# Patient Record
Sex: MALE | Race: WHITE | Employment: OTHER | URBAN - NONMETROPOLITAN AREA
[De-identification: names, ages, dates, MRNs, and addresses within clinical notes are randomized per-mention and may not be internally consistent; named-entity substitution may affect disease eponyms.]

---

## 2017-01-03 ENCOUNTER — ANTI-COAG VISIT (OUTPATIENT)
Dept: OTHER | Age: 82
End: 2017-01-03

## 2017-01-03 VITALS
WEIGHT: 181.4 LBS | HEART RATE: 85 BPM | SYSTOLIC BLOOD PRESSURE: 110 MMHG | DIASTOLIC BLOOD PRESSURE: 60 MMHG | BODY MASS INDEX: 30 KG/M2

## 2017-01-03 DIAGNOSIS — I48.19 PERSISTENT ATRIAL FIBRILLATION (HCC): ICD-10-CM

## 2017-01-03 LAB — POC INR: 2.1 (ref 0.8–1.2)

## 2017-01-03 PROCEDURE — 85610 PROTHROMBIN TIME: CPT | Performed by: NURSE PRACTITIONER

## 2017-01-03 PROCEDURE — 99999 PR OFFICE/OUTPT VISIT,PROCEDURE ONLY: CPT | Performed by: NURSE PRACTITIONER

## 2017-01-03 ASSESSMENT — ENCOUNTER SYMPTOMS
BLOOD IN STOOL: 0
CONSTIPATION: 0
SHORTNESS OF BREATH: 0
DIARRHEA: 0

## 2017-01-20 ENCOUNTER — OFFICE VISIT (OUTPATIENT)
Dept: INTERNAL MEDICINE | Age: 82
End: 2017-01-20

## 2017-01-20 VITALS
HEIGHT: 66 IN | DIASTOLIC BLOOD PRESSURE: 80 MMHG | BODY MASS INDEX: 29.73 KG/M2 | WEIGHT: 185 LBS | HEART RATE: 68 BPM | SYSTOLIC BLOOD PRESSURE: 128 MMHG

## 2017-01-20 DIAGNOSIS — I50.22 CHRONIC SYSTOLIC CONGESTIVE HEART FAILURE (HCC): ICD-10-CM

## 2017-01-20 DIAGNOSIS — C46.9 KAPOSI'S SARCOMA (HCC): ICD-10-CM

## 2017-01-20 DIAGNOSIS — I25.10 ASCVD (ARTERIOSCLEROTIC CARDIOVASCULAR DISEASE): Primary | ICD-10-CM

## 2017-01-20 DIAGNOSIS — F41.9 ANXIETY: ICD-10-CM

## 2017-01-20 DIAGNOSIS — I48.21 PERMANENT ATRIAL FIBRILLATION (HCC): ICD-10-CM

## 2017-01-20 DIAGNOSIS — I10 ESSENTIAL HYPERTENSION: ICD-10-CM

## 2017-01-20 DIAGNOSIS — Z95.2 S/P AVR (AORTIC VALVE REPLACEMENT): ICD-10-CM

## 2017-01-20 DIAGNOSIS — Z95.810 AUTOMATIC IMPLANTABLE CARDIOVERTER-DEFIBRILLATOR IN SITU: ICD-10-CM

## 2017-01-20 DIAGNOSIS — Z95.1 S/P CABG (CORONARY ARTERY BYPASS GRAFT): ICD-10-CM

## 2017-01-20 DIAGNOSIS — Z98.890 S/P MVR (MITRAL VALVE REPAIR): ICD-10-CM

## 2017-01-20 DIAGNOSIS — I49.5 SSS (SICK SINUS SYNDROME) (HCC): ICD-10-CM

## 2017-01-20 DIAGNOSIS — E78.5 HYPERLIPIDEMIA, UNSPECIFIED HYPERLIPIDEMIA TYPE: ICD-10-CM

## 2017-01-20 PROCEDURE — 93000 ELECTROCARDIOGRAM COMPLETE: CPT | Performed by: INTERNAL MEDICINE

## 2017-01-20 PROCEDURE — G8420 CALC BMI NORM PARAMETERS: HCPCS | Performed by: INTERNAL MEDICINE

## 2017-01-20 PROCEDURE — 99214 OFFICE O/P EST MOD 30 MIN: CPT | Performed by: INTERNAL MEDICINE

## 2017-01-20 PROCEDURE — 1123F ACP DISCUSS/DSCN MKR DOCD: CPT | Performed by: INTERNAL MEDICINE

## 2017-01-20 PROCEDURE — 1036F TOBACCO NON-USER: CPT | Performed by: INTERNAL MEDICINE

## 2017-01-20 PROCEDURE — G8484 FLU IMMUNIZE NO ADMIN: HCPCS | Performed by: INTERNAL MEDICINE

## 2017-01-20 PROCEDURE — 4040F PNEUMOC VAC/ADMIN/RCVD: CPT | Performed by: INTERNAL MEDICINE

## 2017-01-20 PROCEDURE — G8427 DOCREV CUR MEDS BY ELIG CLIN: HCPCS | Performed by: INTERNAL MEDICINE

## 2017-01-20 PROCEDURE — G8598 ASA/ANTIPLAT THER USED: HCPCS | Performed by: INTERNAL MEDICINE

## 2017-01-20 RX ORDER — DIGOXIN 250 MCG
250 TABLET ORAL DAILY
Qty: 30 TABLET | Refills: 5 | Status: SHIPPED | OUTPATIENT
Start: 2017-01-20 | End: 2017-09-27 | Stop reason: SDUPTHER

## 2017-01-20 RX ORDER — POTASSIUM CHLORIDE 600 MG/1
TABLET, FILM COATED, EXTENDED RELEASE ORAL
Qty: 30 TABLET | Refills: 5 | Status: SHIPPED | OUTPATIENT
Start: 2017-01-20 | End: 2017-05-24 | Stop reason: SDUPTHER

## 2017-01-20 RX ORDER — FUROSEMIDE 40 MG/1
TABLET ORAL
Qty: 30 TABLET | Refills: 5 | Status: SHIPPED | OUTPATIENT
Start: 2017-01-20 | End: 2017-05-24 | Stop reason: SDUPTHER

## 2017-01-20 RX ORDER — LORAZEPAM 1 MG/1
1 TABLET ORAL NIGHTLY PRN
Qty: 60 TABLET | Refills: 3 | Status: SHIPPED | OUTPATIENT
Start: 2017-01-20 | End: 2017-05-24 | Stop reason: SDUPTHER

## 2017-01-20 RX ORDER — CARVEDILOL 6.25 MG/1
6.25 TABLET ORAL 2 TIMES DAILY
Qty: 60 TABLET | Refills: 5 | Status: SHIPPED | OUTPATIENT
Start: 2017-01-20 | End: 2017-05-24 | Stop reason: SDUPTHER

## 2017-01-20 RX ORDER — ATORVASTATIN CALCIUM 20 MG/1
20 TABLET, FILM COATED ORAL NIGHTLY
Qty: 30 TABLET | Refills: 5 | Status: SHIPPED | OUTPATIENT
Start: 2017-01-20 | End: 2017-05-24 | Stop reason: SDUPTHER

## 2017-01-31 ENCOUNTER — ANTI-COAG VISIT (OUTPATIENT)
Dept: OTHER | Age: 82
End: 2017-01-31

## 2017-01-31 VITALS
HEART RATE: 66 BPM | DIASTOLIC BLOOD PRESSURE: 68 MMHG | SYSTOLIC BLOOD PRESSURE: 145 MMHG | BODY MASS INDEX: 29.83 KG/M2 | WEIGHT: 184.8 LBS

## 2017-01-31 DIAGNOSIS — I48.19 PERSISTENT ATRIAL FIBRILLATION (HCC): ICD-10-CM

## 2017-01-31 LAB — POC INR: 4 (ref 0.8–1.2)

## 2017-01-31 PROCEDURE — G8427 DOCREV CUR MEDS BY ELIG CLIN: HCPCS | Performed by: NURSE PRACTITIONER

## 2017-01-31 PROCEDURE — 4040F PNEUMOC VAC/ADMIN/RCVD: CPT | Performed by: NURSE PRACTITIONER

## 2017-01-31 PROCEDURE — 99999 PR OFFICE/OUTPT VISIT,PROCEDURE ONLY: CPT | Performed by: NURSE PRACTITIONER

## 2017-01-31 PROCEDURE — G8598 ASA/ANTIPLAT THER USED: HCPCS | Performed by: NURSE PRACTITIONER

## 2017-01-31 PROCEDURE — 85610 PROTHROMBIN TIME: CPT | Performed by: NURSE PRACTITIONER

## 2017-01-31 PROCEDURE — G8420 CALC BMI NORM PARAMETERS: HCPCS | Performed by: NURSE PRACTITIONER

## 2017-01-31 RX ORDER — LOPERAMIDE HYDROCHLORIDE 2 MG/1
2 CAPSULE ORAL PRN
Status: ON HOLD | COMMUNITY
End: 2022-01-01 | Stop reason: HOSPADM

## 2017-01-31 ASSESSMENT — ENCOUNTER SYMPTOMS
BLOOD IN STOOL: 0
SHORTNESS OF BREATH: 0
CONSTIPATION: 0

## 2017-02-05 DIAGNOSIS — G31.84 MILD COGNITIVE IMPAIRMENT: ICD-10-CM

## 2017-02-06 RX ORDER — DONEPEZIL HYDROCHLORIDE 5 MG/1
TABLET, FILM COATED ORAL
Qty: 30 TABLET | Refills: 5 | Status: SHIPPED | OUTPATIENT
Start: 2017-02-06 | End: 2017-05-24 | Stop reason: SDUPTHER

## 2017-02-13 ENCOUNTER — ANTI-COAG VISIT (OUTPATIENT)
Dept: OTHER | Age: 82
End: 2017-02-13

## 2017-02-13 VITALS
DIASTOLIC BLOOD PRESSURE: 68 MMHG | SYSTOLIC BLOOD PRESSURE: 129 MMHG | BODY MASS INDEX: 30.31 KG/M2 | HEART RATE: 84 BPM | WEIGHT: 187.8 LBS

## 2017-02-13 DIAGNOSIS — I48.19 PERSISTENT ATRIAL FIBRILLATION (HCC): ICD-10-CM

## 2017-02-13 LAB — POC INR: 2.8 (ref 0.8–1.2)

## 2017-02-13 PROCEDURE — 85610 PROTHROMBIN TIME: CPT | Performed by: PHARMACIST

## 2017-02-13 PROCEDURE — 99999 PR OFFICE/OUTPT VISIT,PROCEDURE ONLY: CPT | Performed by: PHARMACIST

## 2017-02-13 PROCEDURE — G8598 ASA/ANTIPLAT THER USED: HCPCS | Performed by: PHARMACIST

## 2017-02-13 PROCEDURE — 4040F PNEUMOC VAC/ADMIN/RCVD: CPT | Performed by: PHARMACIST

## 2017-02-13 PROCEDURE — G8427 DOCREV CUR MEDS BY ELIG CLIN: HCPCS | Performed by: PHARMACIST

## 2017-02-13 PROCEDURE — G8417 CALC BMI ABV UP PARAM F/U: HCPCS | Performed by: PHARMACIST

## 2017-02-13 ASSESSMENT — ENCOUNTER SYMPTOMS
SHORTNESS OF BREATH: 0
BLOOD IN STOOL: 0
CONSTIPATION: 0
DIARRHEA: 0

## 2017-03-06 ENCOUNTER — ANTI-COAG VISIT (OUTPATIENT)
Dept: OTHER | Age: 82
End: 2017-03-06

## 2017-03-06 VITALS
WEIGHT: 185 LBS | DIASTOLIC BLOOD PRESSURE: 67 MMHG | SYSTOLIC BLOOD PRESSURE: 122 MMHG | HEART RATE: 88 BPM | BODY MASS INDEX: 29.86 KG/M2

## 2017-03-06 DIAGNOSIS — I48.19 PERSISTENT ATRIAL FIBRILLATION (HCC): ICD-10-CM

## 2017-03-06 LAB — POC INR: 2.3 (ref 0.8–1.2)

## 2017-03-06 PROCEDURE — 85610 PROTHROMBIN TIME: CPT | Performed by: NURSE PRACTITIONER

## 2017-03-06 PROCEDURE — G8420 CALC BMI NORM PARAMETERS: HCPCS | Performed by: NURSE PRACTITIONER

## 2017-03-06 PROCEDURE — G8598 ASA/ANTIPLAT THER USED: HCPCS | Performed by: NURSE PRACTITIONER

## 2017-03-06 PROCEDURE — 4040F PNEUMOC VAC/ADMIN/RCVD: CPT | Performed by: NURSE PRACTITIONER

## 2017-03-06 PROCEDURE — G8427 DOCREV CUR MEDS BY ELIG CLIN: HCPCS | Performed by: NURSE PRACTITIONER

## 2017-03-06 PROCEDURE — 99999 PR OFFICE/OUTPT VISIT,PROCEDURE ONLY: CPT | Performed by: NURSE PRACTITIONER

## 2017-03-06 ASSESSMENT — ENCOUNTER SYMPTOMS
CONSTIPATION: 0
DIARRHEA: 0
BLOOD IN STOOL: 0

## 2017-03-29 DIAGNOSIS — I48.19 PERSISTENT ATRIAL FIBRILLATION (HCC): Primary | ICD-10-CM

## 2017-04-03 ENCOUNTER — ANTI-COAG VISIT (OUTPATIENT)
Dept: OTHER | Age: 82
End: 2017-04-03

## 2017-04-03 VITALS
WEIGHT: 182 LBS | DIASTOLIC BLOOD PRESSURE: 69 MMHG | HEART RATE: 82 BPM | SYSTOLIC BLOOD PRESSURE: 120 MMHG | BODY MASS INDEX: 29.38 KG/M2

## 2017-04-03 DIAGNOSIS — I48.19 PERSISTENT ATRIAL FIBRILLATION (HCC): ICD-10-CM

## 2017-04-03 LAB
HCT VFR BLD CALC: 33.1 % (ref 42–52)
HEMOGLOBIN: 10.7 GM/DL (ref 14–18)
POC INR: 1.9 (ref 0.8–1.2)

## 2017-04-03 ASSESSMENT — ENCOUNTER SYMPTOMS
BLOOD IN STOOL: 0
CONSTIPATION: 0
DIARRHEA: 0

## 2017-04-24 ENCOUNTER — ANTI-COAG VISIT (OUTPATIENT)
Dept: OTHER | Age: 82
End: 2017-04-24

## 2017-04-24 VITALS
SYSTOLIC BLOOD PRESSURE: 136 MMHG | DIASTOLIC BLOOD PRESSURE: 69 MMHG | HEART RATE: 85 BPM | BODY MASS INDEX: 29.28 KG/M2 | WEIGHT: 181.4 LBS

## 2017-04-24 DIAGNOSIS — I48.19 PERSISTENT ATRIAL FIBRILLATION (HCC): ICD-10-CM

## 2017-04-24 LAB — POC INR: 2.7 (ref 0.8–1.2)

## 2017-04-24 ASSESSMENT — ENCOUNTER SYMPTOMS
BLOOD IN STOOL: 0
SHORTNESS OF BREATH: 1
CONSTIPATION: 0
DIARRHEA: 0

## 2017-05-22 ENCOUNTER — ANTI-COAG VISIT (OUTPATIENT)
Dept: OTHER | Age: 82
End: 2017-05-22

## 2017-05-22 VITALS
BODY MASS INDEX: 29.54 KG/M2 | WEIGHT: 183 LBS | HEART RATE: 86 BPM | DIASTOLIC BLOOD PRESSURE: 65 MMHG | SYSTOLIC BLOOD PRESSURE: 111 MMHG

## 2017-05-22 DIAGNOSIS — I48.19 PERSISTENT ATRIAL FIBRILLATION (HCC): ICD-10-CM

## 2017-05-22 LAB — POC INR: 2.2 (ref 0.8–1.2)

## 2017-05-22 ASSESSMENT — ENCOUNTER SYMPTOMS
DIARRHEA: 0
CONSTIPATION: 0
BLOOD IN STOOL: 0

## 2017-05-24 ENCOUNTER — OFFICE VISIT (OUTPATIENT)
Dept: INTERNAL MEDICINE | Age: 82
End: 2017-05-24

## 2017-05-24 VITALS
DIASTOLIC BLOOD PRESSURE: 70 MMHG | BODY MASS INDEX: 29.25 KG/M2 | HEIGHT: 66 IN | WEIGHT: 182 LBS | SYSTOLIC BLOOD PRESSURE: 120 MMHG | HEART RATE: 64 BPM

## 2017-05-24 DIAGNOSIS — I10 ESSENTIAL HYPERTENSION: ICD-10-CM

## 2017-05-24 DIAGNOSIS — Z95.810 AUTOMATIC IMPLANTABLE CARDIOVERTER-DEFIBRILLATOR IN SITU: ICD-10-CM

## 2017-05-24 DIAGNOSIS — G31.84 MILD COGNITIVE IMPAIRMENT: ICD-10-CM

## 2017-05-24 DIAGNOSIS — Z23 NEED FOR PROPHYLACTIC VACCINATION AGAINST STREPTOCOCCUS PNEUMONIAE (PNEUMOCOCCUS): ICD-10-CM

## 2017-05-24 DIAGNOSIS — I49.5 SSS (SICK SINUS SYNDROME) (HCC): ICD-10-CM

## 2017-05-24 DIAGNOSIS — F41.9 ANXIETY: ICD-10-CM

## 2017-05-24 DIAGNOSIS — I50.22 CHRONIC SYSTOLIC CONGESTIVE HEART FAILURE (HCC): Primary | ICD-10-CM

## 2017-05-24 DIAGNOSIS — I48.19 PERSISTENT ATRIAL FIBRILLATION (HCC): ICD-10-CM

## 2017-05-24 DIAGNOSIS — E78.5 HYPERLIPIDEMIA, UNSPECIFIED HYPERLIPIDEMIA TYPE: ICD-10-CM

## 2017-05-24 DIAGNOSIS — I35.0 AORTIC VALVE STENOSIS, UNSPECIFIED ETIOLOGY: ICD-10-CM

## 2017-05-24 DIAGNOSIS — Z95.2 S/P AVR (AORTIC VALVE REPLACEMENT): ICD-10-CM

## 2017-05-24 DIAGNOSIS — Z98.890 S/P MVR (MITRAL VALVE REPAIR): ICD-10-CM

## 2017-05-24 DIAGNOSIS — Z23 NEED FOR PNEUMOCOCCAL VACCINATION: ICD-10-CM

## 2017-05-24 DIAGNOSIS — Z87.39 H/O: GOUT: ICD-10-CM

## 2017-05-24 PROCEDURE — 93000 ELECTROCARDIOGRAM COMPLETE: CPT | Performed by: INTERNAL MEDICINE

## 2017-05-24 PROCEDURE — 1036F TOBACCO NON-USER: CPT | Performed by: INTERNAL MEDICINE

## 2017-05-24 PROCEDURE — G0009 ADMIN PNEUMOCOCCAL VACCINE: HCPCS | Performed by: INTERNAL MEDICINE

## 2017-05-24 PROCEDURE — G8427 DOCREV CUR MEDS BY ELIG CLIN: HCPCS | Performed by: INTERNAL MEDICINE

## 2017-05-24 PROCEDURE — 1123F ACP DISCUSS/DSCN MKR DOCD: CPT | Performed by: INTERNAL MEDICINE

## 2017-05-24 PROCEDURE — G8598 ASA/ANTIPLAT THER USED: HCPCS | Performed by: INTERNAL MEDICINE

## 2017-05-24 PROCEDURE — 90732 PPSV23 VACC 2 YRS+ SUBQ/IM: CPT | Performed by: INTERNAL MEDICINE

## 2017-05-24 PROCEDURE — 4040F PNEUMOC VAC/ADMIN/RCVD: CPT | Performed by: INTERNAL MEDICINE

## 2017-05-24 PROCEDURE — G8420 CALC BMI NORM PARAMETERS: HCPCS | Performed by: INTERNAL MEDICINE

## 2017-05-24 PROCEDURE — 99214 OFFICE O/P EST MOD 30 MIN: CPT | Performed by: INTERNAL MEDICINE

## 2017-05-24 RX ORDER — LORAZEPAM 1 MG/1
1 TABLET ORAL NIGHTLY PRN
Qty: 60 TABLET | Refills: 3 | Status: CANCELLED | OUTPATIENT
Start: 2017-05-24

## 2017-05-24 RX ORDER — POTASSIUM CHLORIDE 600 MG/1
TABLET, FILM COATED, EXTENDED RELEASE ORAL
Qty: 30 TABLET | Refills: 5 | Status: SHIPPED | OUTPATIENT
Start: 2017-05-24 | End: 2017-07-17 | Stop reason: ALTCHOICE

## 2017-05-24 RX ORDER — CARVEDILOL 6.25 MG/1
6.25 TABLET ORAL 2 TIMES DAILY
Qty: 60 TABLET | Refills: 5 | Status: SHIPPED | OUTPATIENT
Start: 2017-05-24 | End: 2017-09-27 | Stop reason: SDUPTHER

## 2017-05-24 RX ORDER — ALLOPURINOL 100 MG/1
TABLET ORAL
Qty: 90 TABLET | Refills: 3 | Status: SHIPPED | OUTPATIENT
Start: 2017-05-24 | End: 2017-09-27 | Stop reason: SDUPTHER

## 2017-05-24 RX ORDER — FUROSEMIDE 40 MG/1
TABLET ORAL
Qty: 30 TABLET | Refills: 5 | Status: SHIPPED | OUTPATIENT
Start: 2017-05-24 | End: 2017-09-27 | Stop reason: SDUPTHER

## 2017-05-24 RX ORDER — ATORVASTATIN CALCIUM 20 MG/1
20 TABLET, FILM COATED ORAL NIGHTLY
Qty: 30 TABLET | Refills: 5 | Status: SHIPPED | OUTPATIENT
Start: 2017-05-24 | End: 2017-09-27 | Stop reason: SDUPTHER

## 2017-05-24 RX ORDER — DONEPEZIL HYDROCHLORIDE 5 MG/1
TABLET, FILM COATED ORAL
Qty: 30 TABLET | Refills: 5 | Status: SHIPPED | OUTPATIENT
Start: 2017-05-24 | End: 2017-09-27 | Stop reason: SDUPTHER

## 2017-05-24 RX ORDER — LORAZEPAM 1 MG/1
1 TABLET ORAL NIGHTLY PRN
Qty: 60 TABLET | Refills: 3 | Status: SHIPPED | OUTPATIENT
Start: 2017-05-24 | End: 2017-12-14 | Stop reason: SDUPTHER

## 2017-06-19 ENCOUNTER — ANTI-COAG VISIT (OUTPATIENT)
Dept: OTHER | Age: 82
End: 2017-06-19

## 2017-06-19 VITALS
HEART RATE: 79 BPM | WEIGHT: 180 LBS | DIASTOLIC BLOOD PRESSURE: 65 MMHG | BODY MASS INDEX: 28.93 KG/M2 | SYSTOLIC BLOOD PRESSURE: 120 MMHG

## 2017-06-19 DIAGNOSIS — I48.19 PERSISTENT ATRIAL FIBRILLATION (HCC): ICD-10-CM

## 2017-06-19 LAB — POC INR: 2 (ref 0.8–1.2)

## 2017-06-19 ASSESSMENT — ENCOUNTER SYMPTOMS
DIARRHEA: 0
BLOOD IN STOOL: 0
SHORTNESS OF BREATH: 0
CONSTIPATION: 0

## 2017-07-17 ENCOUNTER — HOSPITAL ENCOUNTER (OUTPATIENT)
Dept: PHARMACY | Age: 82
Setting detail: THERAPIES SERIES
Discharge: HOME | End: 2017-07-17
Payer: MEDICARE

## 2017-07-17 VITALS
HEART RATE: 85 BPM | BODY MASS INDEX: 28.61 KG/M2 | WEIGHT: 178 LBS | SYSTOLIC BLOOD PRESSURE: 105 MMHG | DIASTOLIC BLOOD PRESSURE: 62 MMHG

## 2017-07-17 DIAGNOSIS — I48.19 PERSISTENT ATRIAL FIBRILLATION (HCC): ICD-10-CM

## 2017-07-17 LAB
INTERNATIONAL NORMALIZATION RATIO, POC: 2.3
POC INR: 2.3 (ref 0.8–1.2)

## 2017-07-17 PROCEDURE — 99211 OFF/OP EST MAY X REQ PHY/QHP: CPT

## 2017-07-17 PROCEDURE — 36416 COLLJ CAPILLARY BLOOD SPEC: CPT

## 2017-07-17 PROCEDURE — 85610 PROTHROMBIN TIME: CPT

## 2017-07-17 RX ORDER — SACUBITRIL AND VALSARTAN 49; 51 MG/1; MG/1
1 TABLET, FILM COATED ORAL 2 TIMES DAILY
Refills: 3 | COMMUNITY
Start: 2017-06-20 | End: 2017-11-09 | Stop reason: SDUPTHER

## 2017-07-17 ASSESSMENT — ENCOUNTER SYMPTOMS
CONSTIPATION: 0
DIARRHEA: 0
BLOOD IN STOOL: 0
SHORTNESS OF BREATH: 0

## 2017-08-14 ENCOUNTER — HOSPITAL ENCOUNTER (OUTPATIENT)
Dept: PHARMACY | Age: 82
Setting detail: THERAPIES SERIES
Discharge: HOME | End: 2017-08-14
Payer: MEDICARE

## 2017-08-14 VITALS
BODY MASS INDEX: 28.19 KG/M2 | HEART RATE: 83 BPM | DIASTOLIC BLOOD PRESSURE: 84 MMHG | WEIGHT: 175.4 LBS | SYSTOLIC BLOOD PRESSURE: 104 MMHG

## 2017-08-14 DIAGNOSIS — I48.21 PERMANENT ATRIAL FIBRILLATION (HCC): ICD-10-CM

## 2017-08-14 DIAGNOSIS — I48.19 PERSISTENT ATRIAL FIBRILLATION (HCC): ICD-10-CM

## 2017-08-14 LAB — POC INR: 2.1 (ref 0.8–1.2)

## 2017-08-14 PROCEDURE — 99211 OFF/OP EST MAY X REQ PHY/QHP: CPT

## 2017-08-14 PROCEDURE — 36416 COLLJ CAPILLARY BLOOD SPEC: CPT

## 2017-08-14 PROCEDURE — 85610 PROTHROMBIN TIME: CPT

## 2017-08-14 RX ORDER — WARFARIN SODIUM 5 MG/1
TABLET ORAL EVERY EVENING
COMMUNITY
End: 2017-09-19

## 2017-08-14 RX ORDER — WARFARIN SODIUM 5 MG/1
TABLET ORAL
Qty: 135 TABLET | Refills: 3 | Status: SHIPPED | OUTPATIENT
Start: 2017-08-14 | End: 2018-07-26 | Stop reason: SDUPTHER

## 2017-08-14 ASSESSMENT — ENCOUNTER SYMPTOMS
SHORTNESS OF BREATH: 0
BLOOD IN STOOL: 0
DIARRHEA: 0
CONSTIPATION: 0

## 2017-09-19 ENCOUNTER — HOSPITAL ENCOUNTER (OUTPATIENT)
Dept: PHARMACY | Age: 82
Setting detail: THERAPIES SERIES
Discharge: HOME | End: 2017-09-19
Payer: MEDICARE

## 2017-09-19 VITALS
DIASTOLIC BLOOD PRESSURE: 65 MMHG | BODY MASS INDEX: 28.48 KG/M2 | SYSTOLIC BLOOD PRESSURE: 105 MMHG | HEART RATE: 76 BPM | WEIGHT: 177.2 LBS

## 2017-09-19 DIAGNOSIS — I48.19 PERSISTENT ATRIAL FIBRILLATION (HCC): ICD-10-CM

## 2017-09-19 LAB — POC INR: 1.9 (ref 0.8–1.2)

## 2017-09-19 PROCEDURE — 85610 PROTHROMBIN TIME: CPT

## 2017-09-19 PROCEDURE — 99211 OFF/OP EST MAY X REQ PHY/QHP: CPT

## 2017-09-19 PROCEDURE — 36416 COLLJ CAPILLARY BLOOD SPEC: CPT

## 2017-09-19 ASSESSMENT — ENCOUNTER SYMPTOMS
SHORTNESS OF BREATH: 0
CONSTIPATION: 0
BLOOD IN STOOL: 0

## 2017-09-27 ENCOUNTER — OFFICE VISIT (OUTPATIENT)
Dept: INTERNAL MEDICINE CLINIC | Age: 82
End: 2017-09-27
Payer: MEDICARE

## 2017-09-27 ENCOUNTER — HOSPITAL ENCOUNTER (OUTPATIENT)
Age: 82
Discharge: HOME | End: 2017-09-27
Payer: MEDICARE

## 2017-09-27 VITALS
HEIGHT: 66 IN | DIASTOLIC BLOOD PRESSURE: 50 MMHG | WEIGHT: 179 LBS | SYSTOLIC BLOOD PRESSURE: 90 MMHG | HEART RATE: 64 BPM | BODY MASS INDEX: 28.77 KG/M2

## 2017-09-27 DIAGNOSIS — I50.22 CHRONIC SYSTOLIC CONGESTIVE HEART FAILURE (HCC): Primary | ICD-10-CM

## 2017-09-27 DIAGNOSIS — F41.9 ANXIETY: ICD-10-CM

## 2017-09-27 DIAGNOSIS — E78.5 HYPERLIPIDEMIA, UNSPECIFIED HYPERLIPIDEMIA TYPE: ICD-10-CM

## 2017-09-27 DIAGNOSIS — C46.9 KAPOSI'S SARCOMA (HCC): ICD-10-CM

## 2017-09-27 DIAGNOSIS — I25.10 ASCVD (ARTERIOSCLEROTIC CARDIOVASCULAR DISEASE): ICD-10-CM

## 2017-09-27 DIAGNOSIS — Z95.1 S/P CABG (CORONARY ARTERY BYPASS GRAFT): ICD-10-CM

## 2017-09-27 DIAGNOSIS — I49.5 SSS (SICK SINUS SYNDROME) (HCC): ICD-10-CM

## 2017-09-27 DIAGNOSIS — Z95.810 AUTOMATIC IMPLANTABLE CARDIOVERTER-DEFIBRILLATOR IN SITU: ICD-10-CM

## 2017-09-27 DIAGNOSIS — Z87.39 H/O: GOUT: ICD-10-CM

## 2017-09-27 DIAGNOSIS — I48.21 PERMANENT ATRIAL FIBRILLATION (HCC): ICD-10-CM

## 2017-09-27 DIAGNOSIS — G31.84 MILD COGNITIVE IMPAIRMENT: ICD-10-CM

## 2017-09-27 LAB
ANION GAP SERPL CALCULATED.3IONS-SCNC: 7 MEQ/L (ref 8–16)
BUN BLDV-MCNC: 10 MG/DL (ref 7–22)
CALCIUM SERPL-MCNC: 9.2 MG/DL (ref 8.5–10.5)
CHLORIDE BLD-SCNC: 103 MEQ/L (ref 98–111)
CO2: 30 MEQ/L (ref 23–33)
CREAT SERPL-MCNC: 0.9 MG/DL (ref 0.4–1.2)
DIGOXIN LEVEL: 1.2 NG/ML (ref 0.5–2)
GFR SERPL CREATININE-BSD FRML MDRD: 80 ML/MIN/1.73M2
GLUCOSE BLD-MCNC: 71 MG/DL (ref 70–108)
POTASSIUM SERPL-SCNC: 4.2 MEQ/L (ref 3.5–5.2)
SODIUM BLD-SCNC: 140 MEQ/L (ref 135–145)

## 2017-09-27 PROCEDURE — 1123F ACP DISCUSS/DSCN MKR DOCD: CPT | Performed by: INTERNAL MEDICINE

## 2017-09-27 PROCEDURE — G8417 CALC BMI ABV UP PARAM F/U: HCPCS | Performed by: INTERNAL MEDICINE

## 2017-09-27 PROCEDURE — 36415 COLL VENOUS BLD VENIPUNCTURE: CPT

## 2017-09-27 PROCEDURE — G8427 DOCREV CUR MEDS BY ELIG CLIN: HCPCS | Performed by: INTERNAL MEDICINE

## 2017-09-27 PROCEDURE — 99214 OFFICE O/P EST MOD 30 MIN: CPT | Performed by: INTERNAL MEDICINE

## 2017-09-27 PROCEDURE — G8598 ASA/ANTIPLAT THER USED: HCPCS | Performed by: INTERNAL MEDICINE

## 2017-09-27 PROCEDURE — 93000 ELECTROCARDIOGRAM COMPLETE: CPT | Performed by: INTERNAL MEDICINE

## 2017-09-27 PROCEDURE — 1036F TOBACCO NON-USER: CPT | Performed by: INTERNAL MEDICINE

## 2017-09-27 PROCEDURE — 4040F PNEUMOC VAC/ADMIN/RCVD: CPT | Performed by: INTERNAL MEDICINE

## 2017-09-27 PROCEDURE — 80048 BASIC METABOLIC PNL TOTAL CA: CPT

## 2017-09-27 PROCEDURE — 80162 ASSAY OF DIGOXIN TOTAL: CPT

## 2017-09-27 RX ORDER — ALLOPURINOL 100 MG/1
TABLET ORAL
Qty: 90 TABLET | Refills: 3 | Status: SHIPPED | OUTPATIENT
Start: 2017-09-27 | End: 2018-01-22 | Stop reason: SDUPTHER

## 2017-09-27 RX ORDER — DONEPEZIL HYDROCHLORIDE 5 MG/1
TABLET, FILM COATED ORAL
Qty: 30 TABLET | Refills: 5 | Status: SHIPPED | OUTPATIENT
Start: 2017-09-27 | End: 2017-11-20 | Stop reason: SDUPTHER

## 2017-09-27 RX ORDER — ATORVASTATIN CALCIUM 20 MG/1
20 TABLET, FILM COATED ORAL NIGHTLY
Qty: 30 TABLET | Refills: 5 | Status: SHIPPED | OUTPATIENT
Start: 2017-09-27 | End: 2018-01-22 | Stop reason: SDUPTHER

## 2017-09-27 RX ORDER — FUROSEMIDE 40 MG/1
TABLET ORAL
Qty: 30 TABLET | Refills: 5 | Status: SHIPPED | OUTPATIENT
Start: 2017-09-27 | End: 2018-01-22 | Stop reason: SDUPTHER

## 2017-09-27 RX ORDER — CARVEDILOL 6.25 MG/1
6.25 TABLET ORAL 2 TIMES DAILY
Qty: 60 TABLET | Refills: 5 | Status: SHIPPED | OUTPATIENT
Start: 2017-09-27 | End: 2018-01-22 | Stop reason: SDUPTHER

## 2017-09-27 RX ORDER — DIGOXIN 250 MCG
250 TABLET ORAL DAILY
Qty: 30 TABLET | Refills: 5 | Status: SHIPPED | OUTPATIENT
Start: 2017-09-27 | End: 2018-01-22 | Stop reason: SDUPTHER

## 2017-10-05 ENCOUNTER — NURSE ONLY (OUTPATIENT)
Dept: FAMILY MEDICINE CLINIC | Age: 82
End: 2017-10-05
Payer: MEDICARE

## 2017-10-05 DIAGNOSIS — Z23 NEED FOR INFLUENZA VACCINATION: Primary | ICD-10-CM

## 2017-10-05 PROCEDURE — 90688 IIV4 VACCINE SPLT 0.5 ML IM: CPT | Performed by: FAMILY MEDICINE

## 2017-10-05 PROCEDURE — G0008 ADMIN INFLUENZA VIRUS VAC: HCPCS | Performed by: FAMILY MEDICINE

## 2017-10-10 ENCOUNTER — HOSPITAL ENCOUNTER (OUTPATIENT)
Dept: PHARMACY | Age: 82
Setting detail: THERAPIES SERIES
Discharge: HOME | End: 2017-10-10
Payer: MEDICARE

## 2017-10-10 VITALS
SYSTOLIC BLOOD PRESSURE: 111 MMHG | BODY MASS INDEX: 28.48 KG/M2 | HEART RATE: 84 BPM | DIASTOLIC BLOOD PRESSURE: 59 MMHG | WEIGHT: 177.2 LBS

## 2017-10-10 DIAGNOSIS — I48.19 PERSISTENT ATRIAL FIBRILLATION (HCC): ICD-10-CM

## 2017-10-10 LAB
HCT VFR BLD CALC: 32.1 % (ref 42–52)
HEMOGLOBIN: 10.4 GM/DL (ref 14–18)
POC INR: 2 (ref 0.8–1.2)

## 2017-10-10 PROCEDURE — 36415 COLL VENOUS BLD VENIPUNCTURE: CPT

## 2017-10-10 PROCEDURE — 99211 OFF/OP EST MAY X REQ PHY/QHP: CPT

## 2017-10-10 PROCEDURE — 85610 PROTHROMBIN TIME: CPT

## 2017-10-10 ASSESSMENT — ENCOUNTER SYMPTOMS
CONSTIPATION: 0
SHORTNESS OF BREATH: 0
DIARRHEA: 0
BLOOD IN STOOL: 0

## 2017-10-10 NOTE — PROGRESS NOTES
The [address] [facility]  Anticoagulation Clinic  [phone] (phone)  [phone] (fax)  Subjective:      Patient ID: Walter Holloway is a 80 y.o. male. HPI  Has diagnosis of persistent atrial fib. , per Dr. Sherri Contreras referral - being seen for Warfarin monitoring (3 week visit). Correctly states tablet strength, with help from wife. Followed printed instructions for dose. Denies missed dose. Denies diet change. Review of Systems   Constitutional: Negative for activity change, appetite change and fatigue. HENT: Negative for nosebleeds. Respiratory: Negative for shortness of breath. Cardiovascular: Negative for chest pain and leg swelling. Gastrointestinal: Negative for blood in stool, constipation and diarrhea. Genitourinary: Negative for hematuria. Neurological: Negative for dizziness, weakness, light-headedness and headaches. Hematological: Does not bruise/bleed easily. Objective:   Physical Exam   Constitutional: He is oriented to person, place, and time. He appears well-developed. Cardiovascular: Normal rate. Pulmonary/Chest: Effort normal.   Neurological: He is alert and oriented to person, place, and time. Skin: Skin is warm and dry. Psychiatric: He has a normal mood and affect. His behavior is normal.   Vitals reviewed. Assessment:      Lab Results   Component Value Date    INR 2.00 (H) 10/10/2017    INR 1.90 (H) 09/19/2017    INR 2.10 (H) 08/14/2017     INR therapeutic - goal 2-3. Recent Labs      10/10/17   1051   INR  2.00*     Today's H&H:  10.4/32.1 (10.7/33.1 last April). Plan:    POCT INR ordered/reviewed. Routine H&H ordered; drawn per RMA - sent to lab. Continue Coumadin 5 mg MWF, 7.5 mg TThSS PO. Recheck INR 4 weeks. Patient reminded to call the Anticoagulation Clinic with any signs or symptoms of bleeding or with any medication changes. Patient given instructions utilizing the teach back method.     Discharged ambulatory in no apparent distress, with wife.

## 2017-10-24 ENCOUNTER — TELEPHONE (OUTPATIENT)
Dept: INTERNAL MEDICINE CLINIC | Age: 82
End: 2017-10-24

## 2017-10-24 DIAGNOSIS — Z95.810 AUTOMATIC IMPLANTABLE CARDIOVERTER-DEFIBRILLATOR IN SITU: Primary | ICD-10-CM

## 2017-10-24 DIAGNOSIS — R07.9 CHEST PAIN, UNSPECIFIED TYPE: ICD-10-CM

## 2017-10-25 ENCOUNTER — TELEPHONE (OUTPATIENT)
Dept: CARDIOLOGY CLINIC | Age: 82
End: 2017-10-25

## 2017-10-25 ENCOUNTER — NURSE ONLY (OUTPATIENT)
Dept: CARDIOLOGY CLINIC | Age: 82
End: 2017-10-25
Payer: MEDICARE

## 2017-10-25 DIAGNOSIS — Z95.810 AUTOMATIC IMPLANTABLE CARDIOVERTER-DEFIBRILLATOR IN SITU: Primary | ICD-10-CM

## 2017-10-25 PROCEDURE — 93282 PRGRMG EVAL IMPLANTABLE DFB: CPT | Performed by: INTERNAL MEDICINE

## 2017-10-26 ENCOUNTER — OFFICE VISIT (OUTPATIENT)
Dept: CARDIOLOGY CLINIC | Age: 82
End: 2017-10-26
Payer: MEDICARE

## 2017-10-26 VITALS
SYSTOLIC BLOOD PRESSURE: 98 MMHG | BODY MASS INDEX: 28.28 KG/M2 | HEIGHT: 66 IN | HEART RATE: 63 BPM | DIASTOLIC BLOOD PRESSURE: 52 MMHG | WEIGHT: 176 LBS

## 2017-10-26 DIAGNOSIS — I48.19 PERSISTENT ATRIAL FIBRILLATION (HCC): Primary | ICD-10-CM

## 2017-10-26 DIAGNOSIS — I47.20 VT (VENTRICULAR TACHYCARDIA): ICD-10-CM

## 2017-10-26 PROCEDURE — G8427 DOCREV CUR MEDS BY ELIG CLIN: HCPCS | Performed by: INTERNAL MEDICINE

## 2017-10-26 PROCEDURE — 4040F PNEUMOC VAC/ADMIN/RCVD: CPT | Performed by: INTERNAL MEDICINE

## 2017-10-26 PROCEDURE — 93000 ELECTROCARDIOGRAM COMPLETE: CPT | Performed by: INTERNAL MEDICINE

## 2017-10-26 PROCEDURE — 1036F TOBACCO NON-USER: CPT | Performed by: INTERNAL MEDICINE

## 2017-10-26 PROCEDURE — G8484 FLU IMMUNIZE NO ADMIN: HCPCS | Performed by: INTERNAL MEDICINE

## 2017-10-26 PROCEDURE — G8417 CALC BMI ABV UP PARAM F/U: HCPCS | Performed by: INTERNAL MEDICINE

## 2017-10-26 PROCEDURE — 1123F ACP DISCUSS/DSCN MKR DOCD: CPT | Performed by: INTERNAL MEDICINE

## 2017-10-26 PROCEDURE — G8598 ASA/ANTIPLAT THER USED: HCPCS | Performed by: INTERNAL MEDICINE

## 2017-10-26 PROCEDURE — 99204 OFFICE O/P NEW MOD 45 MIN: CPT | Performed by: INTERNAL MEDICINE

## 2017-10-26 ASSESSMENT — ENCOUNTER SYMPTOMS
WHEEZING: 0
ABDOMINAL PAIN: 0
RIGHT EYE: 0
BLURRED VISION: 0
VOMITING: 0
DOUBLE VISION: 0
SHORTNESS OF BREATH: 0
SORE THROAT: 0
NAUSEA: 0
COUGH: 0
CONSTIPATION: 0
BACK PAIN: 0
LEFT EYE: 0
DIARRHEA: 0

## 2017-10-26 NOTE — PROGRESS NOTES
[facility] [address] [facility], [address] [facility]  Dept: [phone]  Dept Fax: [phone]      CARDIAC ELECTROPHYSIOLOGY  CONSULTATION    10/26/2017      REFERRING PROVIDER:  Dr. 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 He is s/p 3V CABG as well as AVR and MV annuloplasty in 2008. The patient has been doing very well from a cardiac standpoint. He has not had any recent heart failure exacerbations or episodes of angina. 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 He has a single chamber ICD and has had ATP for VT. The patient's last episode was in June. The patient has permanent atrial fibrillation secondary to valvular heart disease. The patient has been seen by Dr. Boogie Fournier in the past and the patient is unhappy with his care. He is going to continue to have Dr. Tahmina Ervin manage his cardiac issues, and I am being asked to see the patient for his h/o atrial fibrillation and ventricular tachycardia. PMH:  History obtained from chart review and the patient.   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    Medtronic single ICD 9/22/2011    Persistent atrial fibrillation ([address] 2/11/2013    SSS (sick sinus syndrome) ([address]     Status post pacemaker       PSH:  Past Surgical History:   Procedure Laterality Date    COLONOSCOPY  9/21/2010    Dr Bo Patel  2008    [facility]    EYE SURGERY      PACEMAKER INSERTION         FAMILY HISTORY:  Family History   Problem Relation Age of Onset    Heart Disease Mother     Heart Disease Father        SOCIAL HISTORY:  Social History     Social History    Marital status:      Spouse name: N/A    Number of children: N/A    Years of education: N/A     Social History Main Topics    Smoking status: Former Smoker     Packs/day: 1.00     Years: 50.00     Types: Cigarettes, Cigars     Quit date: 5/1/2008    Smokeless tobacco: Never Used    Alcohol use 0.0 oz/week      Comment: RARELY    Drug use: No    Sexual activity: Not on file     Other Topics Concern    Not on file     Social History Narrative    No narrative on file       MEDICATIONS:  Current Outpatient Prescriptions   Medication Sig Dispense Refill    furosemide (LASIX) 40 MG tablet TAKE 1 TABLET BY MOUTH DAILY 30 tablet 5    carvedilol (COREG) 6.25 MG tablet Take 1 tablet by mouth 2 times daily 60 tablet 5    allopurinol (ZYLOPRIM) 100 MG tablet TAKE 1 TABLET BY MOUTH EVERY MORNING 90 tablet 3    donepezil (ARICEPT) 5 MG tablet TAKE 1 TABLET BY MOUTH EVERY NIGHT 30 tablet 5    atorvastatin (LIPITOR) 20 MG tablet Take 1 tablet by mouth nightly 30 tablet 5    digoxin (DIGOX) 250 MCG tablet Take 1 tablet by mouth daily 30 tablet 5    warfarin (COUMADIN) 5 MG tablet Take as directed by [facility].  135 tablets = 90 days 135 tablet 3    ENTRESTO 49-51 MG per tablet Take 1 tablet by mouth 2 times daily  3    LORazepam (ATIVAN) 1 MG tablet Take 1 tablet by mouth nightly as needed for Anxiety 60 tablet 3    loperamide (IMODIUM) 2 MG capsule Take 2 mg by mouth See Admin Instructions Indications: Diarrhea      Multiple Vitamins-Minerals (THERAPEUTIC MULTIVITAMIN-MINERALS) tablet Take 1 tablet by mouth 2 times daily Indications: Nutritional Support Noon and Supper      acetaminophen (TYLENOL) 325 MG tablet Take 650 mg by mouth every 6 hours as needed for Pain or Fever Indications: Pain Don't take more than 3,000 mg per day      Calcium Carbonate-Vitamin D (CALCIUM + D PO) Take 1 tablet by mouth 2 times daily Indications: Treatment to Prevent Vitamin Deficiency Noon and Supper      aspirin 81 MG EC tablet Take 81 mg by mouth nightly Indications: Anticoagulant Therapy With food. No current facility-administered medications for this visit. REVIEW OF SYSTEMS:    Review of Systems   Constitution: Negative for fever, weight gain and weight loss. HENT: Negative for hearing loss and sore throat. Eyes: Negative for blurred vision, double vision, vision loss in left eye and vision loss in right eye. Cardiovascular: Negative for chest pain, dyspnea on exertion, irregular heartbeat, near-syncope, palpitations and syncope. Respiratory: Negative for cough, shortness of breath and wheezing. Endocrine: Negative for cold intolerance, heat intolerance and polyuria. Hematologic/Lymphatic: Negative for bleeding problem. Does not bruise/bleed easily. Skin: Negative for dry skin, itching and rash. Musculoskeletal: Negative for arthritis, back pain, joint pain and myalgias. Gastrointestinal: Negative for abdominal pain, constipation, diarrhea, nausea and vomiting. Genitourinary: Negative for dysuria, frequency, hematuria and urgency. Neurological: Negative for dizziness, headaches, light-headedness, numbness, paresthesias, seizures and vertigo. Psychiatric/Behavioral: Negative for depression and memory loss. The patient is not nervous/anxious. PHYSICAL EXAM:  BP (!) 98/52   Pulse 63   Ht 5' 6\" (1.676 m)   Wt 176 lb (79.8 kg)   BMI 28.41 kg/m²     Physical Exam   Constitutional: He is oriented to person, place, and time. No distress. HENT:   Head: Normocephalic and atraumatic. Head is without contusion. Right Ear: Hearing and external ear normal. No decreased hearing is noted. Left Ear: Hearing and external ear normal. No decreased hearing is noted.    Nose: Nose Psychiatric: His speech is normal and behavior is normal. Judgment and thought content normal. His mood appears not anxious. His affect is not angry. Cognition and memory are normal. He does not exhibit a depressed mood. Nursing note and vitals reviewed. DIAGNOSTIC STUDIES & LABORATORY DATA:    I have personally reviewed and interpreted the results of the following diagnostic testing  CARDIAC HISTORY:  CATH: 10/13/2016 AVR, MV annuloplasty, 3V CABG LIMA LAD, SVG RCA, SVG OM  ECHO: 10/11/2016  Summary   Left ventricle size is normal.   Mild concentric left ventricular hypertrophy.   Ejection fraction is visually estimated in the range of 50% to 55%.   There were no regional wall motion abnormalities.   The left atrium is Moderately dilated.   Normal right ventricular size and function.   Right ventricular systolic pressure was normal.   The aortic valve appears to be trileaflet with good leaflet separation.   Mitral annular calcification is present.   Calcification of the mitral valve noted.   No evidence of mitral valve stenosis.   Mild mitral regurgitation with centrally directed jet. ECG: 10/26/2017 Ventricular pacing with underlying atrial fibrillation. STRESS TEST: 06/17/2016  Summary   Lexiscan EKG stress test is non diagnostic for ischemia.   Calculated gated LVEF 45 %.   The T.I.D. ratio was 1.23 .   There is mild attenuation artifact noted in the apex and inferior wall   seems to be related to bowel artifact.   There was no evidence of definite reversible tracer uptake.   The nuclear images is not suggestive for myocardial ischemia.   ICD ANALYSIS:  10/25/2017  No episodes since June      Lab Results   Component Value Date    WBC 7.2 10/11/2016    HGB 10.4 (L) 10/10/2017    HCT 32.1 (L) 10/10/2017     10/11/2016    CHOL 115 10/10/2016    TRIG 64 10/10/2016    HDL 50 10/10/2016    LDLDIRECT 66.42 07/29/2015    ALT 8 (L) 10/11/2016    AST 17 10/11/2016     09/27/2017    K 4.2 09/27/2017  09/27/2017    CREATININE 0.9 09/27/2017    BUN 10 09/27/2017    CO2 30 09/27/2017    TSH 4.050 10/10/2016    INR 2.00 (H) 10/10/2017          IMPRESSION:  1. ASCVD native vessels without angina: The patient is s/p 3V CABG in 2008. He is doing well and denies having any angina or anginal equivalents. 2. Ischemic Cardiomyopathy:  The patient's EF has normalized with revascularization and medical therapy. 3. AICD in situ:  Device interrogation yesterday showed normal function and no episodes of VT since June  4. VT:  No recurrences since June. I will continue to monitor. Will hold off on antiarrhythmic medication at this time. 5. AVR and MV annuloplasty:  S/o valve surgery in 2008. ECHO in 2016 showed normal function. I will repeat ECHO after next visit. 6. Permanent atrial fibrillation:  Secondary to mitral valve disease. Ventricular rate is controlled and he is on warfarin. PLAN:  1. Continue current medications. 2. Repeat ECHO in 4-6 months. 3. Continue device checks as scheduled.          Electronically signed by Natalya Montoya MD on 10/26/2017 at 10:02 AM

## 2017-11-07 ENCOUNTER — HOSPITAL ENCOUNTER (OUTPATIENT)
Dept: PHARMACY | Age: 82
Setting detail: THERAPIES SERIES
Discharge: HOME | End: 2017-11-07
Payer: MEDICARE

## 2017-11-07 VITALS
SYSTOLIC BLOOD PRESSURE: 130 MMHG | HEART RATE: 78 BPM | DIASTOLIC BLOOD PRESSURE: 73 MMHG | BODY MASS INDEX: 27.89 KG/M2 | WEIGHT: 172.8 LBS

## 2017-11-07 DIAGNOSIS — I48.19 PERSISTENT ATRIAL FIBRILLATION (HCC): ICD-10-CM

## 2017-11-07 LAB — POC INR: 2.2 (ref 0.8–1.2)

## 2017-11-07 PROCEDURE — 85610 PROTHROMBIN TIME: CPT | Performed by: PHARMACIST

## 2017-11-07 PROCEDURE — 99211 OFF/OP EST MAY X REQ PHY/QHP: CPT | Performed by: PHARMACIST

## 2017-11-07 PROCEDURE — 36416 COLLJ CAPILLARY BLOOD SPEC: CPT | Performed by: PHARMACIST

## 2017-11-09 RX ORDER — SACUBITRIL AND VALSARTAN 49; 51 MG/1; MG/1
1 TABLET, FILM COATED ORAL 2 TIMES DAILY
Qty: 112 TABLET | Refills: 0 | COMMUNITY
Start: 2017-09-27 | End: 2018-01-29 | Stop reason: SDUPTHER

## 2017-11-20 ENCOUNTER — OFFICE VISIT (OUTPATIENT)
Dept: FAMILY MEDICINE CLINIC | Age: 82
End: 2017-11-20
Payer: MEDICARE

## 2017-11-20 VITALS
RESPIRATION RATE: 16 BRPM | WEIGHT: 177.4 LBS | TEMPERATURE: 97.7 F | BODY MASS INDEX: 29.56 KG/M2 | HEIGHT: 65 IN | SYSTOLIC BLOOD PRESSURE: 120 MMHG | DIASTOLIC BLOOD PRESSURE: 62 MMHG | HEART RATE: 81 BPM

## 2017-11-20 DIAGNOSIS — G31.84 MILD COGNITIVE IMPAIRMENT: ICD-10-CM

## 2017-11-20 DIAGNOSIS — J38.7 LARYNGEAL DISORDER: ICD-10-CM

## 2017-11-20 DIAGNOSIS — R49.0 HOARSENESS: Primary | ICD-10-CM

## 2017-11-20 DIAGNOSIS — I10 ESSENTIAL HYPERTENSION: ICD-10-CM

## 2017-11-20 PROCEDURE — 99214 OFFICE O/P EST MOD 30 MIN: CPT | Performed by: FAMILY MEDICINE

## 2017-11-20 PROCEDURE — 1123F ACP DISCUSS/DSCN MKR DOCD: CPT | Performed by: FAMILY MEDICINE

## 2017-11-20 PROCEDURE — G8484 FLU IMMUNIZE NO ADMIN: HCPCS | Performed by: FAMILY MEDICINE

## 2017-11-20 PROCEDURE — G8598 ASA/ANTIPLAT THER USED: HCPCS | Performed by: FAMILY MEDICINE

## 2017-11-20 PROCEDURE — G8427 DOCREV CUR MEDS BY ELIG CLIN: HCPCS | Performed by: FAMILY MEDICINE

## 2017-11-20 PROCEDURE — G8417 CALC BMI ABV UP PARAM F/U: HCPCS | Performed by: FAMILY MEDICINE

## 2017-11-20 PROCEDURE — 4040F PNEUMOC VAC/ADMIN/RCVD: CPT | Performed by: FAMILY MEDICINE

## 2017-11-20 PROCEDURE — 1036F TOBACCO NON-USER: CPT | Performed by: FAMILY MEDICINE

## 2017-11-20 RX ORDER — DONEPEZIL HYDROCHLORIDE 5 MG/1
TABLET, FILM COATED ORAL
Qty: 30 TABLET | Refills: 11 | Status: SHIPPED | OUTPATIENT
Start: 2017-11-20 | End: 2018-02-20

## 2017-11-20 ASSESSMENT — PATIENT HEALTH QUESTIONNAIRE - PHQ9
SUM OF ALL RESPONSES TO PHQ9 QUESTIONS 1 & 2: 0
1. LITTLE INTEREST OR PLEASURE IN DOING THINGS: 0
SUM OF ALL RESPONSES TO PHQ QUESTIONS 1-9: 0
2. FEELING DOWN, DEPRESSED OR HOPELESS: 0

## 2017-11-20 NOTE — PROGRESS NOTES
to trial humidifier in bedroom  -Chronic issues well controlled, continue current medications  -Advised to call if any issues      Return in about 1 year (around 11/20/2018), or if symptoms worsen or fail to improve. Bladeestrella Banks received counseling on the following healthy behaviors: medication adherence  Reviewed prior labs and health maintenance. Continue current medications, diet and exercise. Discussed use, benefit, and side effects of prescribed medications. Barriers to medication compliance addressed. Patient given educational materials - see patient instructions. All patient questions answered. Patient voiced understanding.

## 2017-12-12 ENCOUNTER — HOSPITAL ENCOUNTER (OUTPATIENT)
Dept: PHARMACY | Age: 82
Setting detail: THERAPIES SERIES
Discharge: HOME | End: 2017-12-12
Payer: MEDICARE

## 2017-12-12 DIAGNOSIS — I48.19 PERSISTENT ATRIAL FIBRILLATION (HCC): ICD-10-CM

## 2017-12-12 LAB — POC INR: 2.7 (ref 0.8–1.2)

## 2017-12-12 PROCEDURE — 99211 OFF/OP EST MAY X REQ PHY/QHP: CPT

## 2017-12-12 PROCEDURE — 85610 PROTHROMBIN TIME: CPT

## 2017-12-12 PROCEDURE — 36416 COLLJ CAPILLARY BLOOD SPEC: CPT

## 2017-12-12 ASSESSMENT — ENCOUNTER SYMPTOMS
DIARRHEA: 0
SHORTNESS OF BREATH: 0
BLOOD IN STOOL: 0
CONSTIPATION: 0

## 2017-12-14 DIAGNOSIS — F41.9 ANXIETY: ICD-10-CM

## 2017-12-14 NOTE — TELEPHONE ENCOUNTER
Patient requesting refills on medication. Next appointment is 01/22/2018. Last script given for Lorazepam was 5/24/17.

## 2017-12-15 RX ORDER — LORAZEPAM 1 MG/1
1 TABLET ORAL NIGHTLY PRN
Qty: 60 TABLET | Refills: 3 | Status: SHIPPED | OUTPATIENT
Start: 2017-12-15 | End: 2018-01-22 | Stop reason: SDUPTHER

## 2017-12-18 ENCOUNTER — TELEPHONE (OUTPATIENT)
Dept: INTERNAL MEDICINE CLINIC | Age: 82
End: 2017-12-18

## 2018-01-08 DIAGNOSIS — G31.84 MILD COGNITIVE IMPAIRMENT: ICD-10-CM

## 2018-01-10 RX ORDER — DONEPEZIL HYDROCHLORIDE 5 MG/1
TABLET, FILM COATED ORAL
Qty: 90 TABLET | Refills: 5 | Status: SHIPPED | OUTPATIENT
Start: 2018-01-10 | End: 2018-05-21 | Stop reason: SDUPTHER

## 2018-01-16 ENCOUNTER — HOSPITAL ENCOUNTER (OUTPATIENT)
Dept: PHARMACY | Age: 83
Setting detail: THERAPIES SERIES
Discharge: HOME | End: 2018-01-16
Payer: MEDICARE

## 2018-01-16 DIAGNOSIS — I48.19 PERSISTENT ATRIAL FIBRILLATION (HCC): ICD-10-CM

## 2018-01-16 LAB — POC INR: 2 (ref 0.8–1.2)

## 2018-01-16 PROCEDURE — 99211 OFF/OP EST MAY X REQ PHY/QHP: CPT

## 2018-01-16 PROCEDURE — 85610 PROTHROMBIN TIME: CPT

## 2018-01-16 PROCEDURE — 36416 COLLJ CAPILLARY BLOOD SPEC: CPT

## 2018-01-16 ASSESSMENT — ENCOUNTER SYMPTOMS
SHORTNESS OF BREATH: 0
CONSTIPATION: 0
BLOOD IN STOOL: 0
DIARRHEA: 1

## 2018-01-22 ENCOUNTER — TELEPHONE (OUTPATIENT)
Dept: INTERNAL MEDICINE CLINIC | Age: 83
End: 2018-01-22

## 2018-01-22 ENCOUNTER — OFFICE VISIT (OUTPATIENT)
Dept: INTERNAL MEDICINE CLINIC | Age: 83
End: 2018-01-22
Payer: MEDICARE

## 2018-01-22 VITALS
HEIGHT: 65 IN | HEART RATE: 64 BPM | SYSTOLIC BLOOD PRESSURE: 96 MMHG | WEIGHT: 182 LBS | BODY MASS INDEX: 30.32 KG/M2 | DIASTOLIC BLOOD PRESSURE: 70 MMHG

## 2018-01-22 DIAGNOSIS — F41.9 ANXIETY: ICD-10-CM

## 2018-01-22 DIAGNOSIS — Z87.39 H/O: GOUT: ICD-10-CM

## 2018-01-22 DIAGNOSIS — C46.9 KAPOSI'S SARCOMA (HCC): ICD-10-CM

## 2018-01-22 DIAGNOSIS — I10 ESSENTIAL HYPERTENSION: ICD-10-CM

## 2018-01-22 DIAGNOSIS — I48.21 PERMANENT ATRIAL FIBRILLATION (HCC): ICD-10-CM

## 2018-01-22 DIAGNOSIS — Z98.890 S/P MVR (MITRAL VALVE REPAIR): ICD-10-CM

## 2018-01-22 DIAGNOSIS — Z95.810 AUTOMATIC IMPLANTABLE CARDIOVERTER-DEFIBRILLATOR IN SITU: ICD-10-CM

## 2018-01-22 DIAGNOSIS — E78.5 HYPERLIPIDEMIA, UNSPECIFIED HYPERLIPIDEMIA TYPE: ICD-10-CM

## 2018-01-22 DIAGNOSIS — Z95.1 S/P CABG (CORONARY ARTERY BYPASS GRAFT): ICD-10-CM

## 2018-01-22 DIAGNOSIS — I50.22 CHRONIC SYSTOLIC CONGESTIVE HEART FAILURE (HCC): Primary | ICD-10-CM

## 2018-01-22 DIAGNOSIS — I49.5 SSS (SICK SINUS SYNDROME) (HCC): ICD-10-CM

## 2018-01-22 PROCEDURE — 1123F ACP DISCUSS/DSCN MKR DOCD: CPT | Performed by: INTERNAL MEDICINE

## 2018-01-22 PROCEDURE — G8598 ASA/ANTIPLAT THER USED: HCPCS | Performed by: INTERNAL MEDICINE

## 2018-01-22 PROCEDURE — 1036F TOBACCO NON-USER: CPT | Performed by: INTERNAL MEDICINE

## 2018-01-22 PROCEDURE — G8417 CALC BMI ABV UP PARAM F/U: HCPCS | Performed by: INTERNAL MEDICINE

## 2018-01-22 PROCEDURE — 4040F PNEUMOC VAC/ADMIN/RCVD: CPT | Performed by: INTERNAL MEDICINE

## 2018-01-22 PROCEDURE — G8427 DOCREV CUR MEDS BY ELIG CLIN: HCPCS | Performed by: INTERNAL MEDICINE

## 2018-01-22 PROCEDURE — 93000 ELECTROCARDIOGRAM COMPLETE: CPT | Performed by: INTERNAL MEDICINE

## 2018-01-22 PROCEDURE — G8484 FLU IMMUNIZE NO ADMIN: HCPCS | Performed by: INTERNAL MEDICINE

## 2018-01-22 PROCEDURE — 99214 OFFICE O/P EST MOD 30 MIN: CPT | Performed by: INTERNAL MEDICINE

## 2018-01-22 RX ORDER — DIGOXIN 250 MCG
250 TABLET ORAL DAILY
Qty: 90 TABLET | Refills: 3 | Status: SHIPPED | OUTPATIENT
Start: 2018-01-22 | End: 2019-01-14 | Stop reason: SDUPTHER

## 2018-01-22 RX ORDER — LORAZEPAM 1 MG/1
1 TABLET ORAL NIGHTLY PRN
Qty: 60 TABLET | Refills: 3 | Status: SHIPPED | OUTPATIENT
Start: 2018-01-22 | End: 2018-03-23

## 2018-01-22 RX ORDER — FUROSEMIDE 40 MG/1
TABLET ORAL
Qty: 90 TABLET | Refills: 3 | Status: SHIPPED | OUTPATIENT
Start: 2018-01-22 | End: 2019-01-14 | Stop reason: SDUPTHER

## 2018-01-22 RX ORDER — ATORVASTATIN CALCIUM 20 MG/1
20 TABLET, FILM COATED ORAL NIGHTLY
Qty: 90 TABLET | Refills: 3 | Status: SHIPPED | OUTPATIENT
Start: 2018-01-22 | End: 2018-05-21 | Stop reason: SDUPTHER

## 2018-01-22 RX ORDER — ALLOPURINOL 100 MG/1
TABLET ORAL
Qty: 90 TABLET | Refills: 3 | Status: SHIPPED | OUTPATIENT
Start: 2018-01-22 | End: 2019-01-14 | Stop reason: SDUPTHER

## 2018-01-22 RX ORDER — CARVEDILOL 6.25 MG/1
6.25 TABLET ORAL 2 TIMES DAILY
Qty: 180 TABLET | Refills: 3 | Status: SHIPPED | OUTPATIENT
Start: 2018-01-22 | End: 2019-01-14 | Stop reason: SDUPTHER

## 2018-01-22 NOTE — PROGRESS NOTES
Chief Complaint   Patient presents with    Coronary Artery Disease    Atrial Fibrillation    Hypertension    Hyperlipidemia       Patient presents for medical evaluation. I last saw the patient 4 months   ago. Patient is followed on a regular basis by Dr. Latisha Moody  Patient has not had any admissions to the hospital or change in medications since the last visit here. Pertinent past history reviewed and summarized below  He has an ICD with EF 25%   He has permanent atrial fibrillation, on warfarin. He had CABG, MR repair 2008    Patient says 18 months ago he felt a jolt in his chest.  He said his arm started shaking. He laid down, symptoms went away. 3 days later he went and got his defibrillator checked. He had an episode of anti-tachycardia pacing for ventricular tachycardia. He has had no problems since. He feels fine  I started coreg and had him stop driving  He is back to driving. Interrogation of his ICD shows 13 beats of ventricular tachycardia in June    He was admitted in October with chest pain. Dr. Pennie Romero did a cardiac catheterization which did show some CAD, not really amenable to intervention. Echo showed EF up to 50%. He is managed medically. He has done well    He says he is doing well. Denies weight gain. No orthopnea or PND.   He says he no longer sees Dr. Pennie Romero  He is now seeing Dr. Elena Massey  Patient Active Problem List   Diagnosis    CAD (coronary artery disease)    Aortic stenosis    SSS (sick sinus syndrome) ([address]    Hyperlipidemia    [facility]osi's [facility]    S/P CABG (coronary artery bypass graft)    Medtronic single ICD    GERD (gastroesophageal reflux disease)    Hyperuricemia    HTN (hypertension)    S/P MVR (mitral valve repair), Physio ring, 2008    Persistent atrial fibrillation (HCC)    Pacemaker    Dysphagia    Hoarseness    Laryngeal disorder    S/P AVR (aortic valve replacement)    Anticoagulated on Coumadin    Mild cognitive impairment       Current wheezing  Cardiovascular ROS: he does have dyspnea on exertion, usually about one block  Gastrointestinal ROS: no abdominal pain, change in bowel habits, or black or bloody stools  Genito-Urinary ROS: no dysuria, hesitancy, nocturia present  Musculoskeletal ROS:  negative  Neurological ROS: no TIA or stroke symptoms  Dermatological ROS: negative    Blood pressure 96/70, pulse 64, height 5' 5\" (1.651 m), weight 182 lb (82.6 kg). Body mass index is 30.29 kg/m². Palpable, systolic blood pressure is 105  Physical Examination: General appearance - alert, well appearing, and in no distress  Mental status - alert, oriented to person, place, and time  Neck - Neck is supple, no JVD or carotid bruits. No thyromegaly or adenopathy. Chest - clear to auscultation, no wheezes, rales or rhonchi, symmetric air entry  Heart - normal rate, regular rhythm, normal S1, S2, loud 3/6 systolic murmur over the entire precordium, no rubs, clicks or gallops  Abdomen - soft, nontender, nondistended, no masses or organomegaly  Neurological - alert, oriented, normal speech, no focal findings or movement disorder noted  Extremities - peripheral pulses normal, no pedal edema, no clubbing or cyanosis  Skin - normal coloration and turgor, no rashes, no suspicious skin lesions noted    Orders Placed This Encounter   Procedures    EKG 12 Lead     Order Specific Question:   Reason for Exam?     Answer: Other       Outpatient Encounter Prescriptions as of 1/22/2018   Medication Sig Dispense Refill    atorvastatin (LIPITOR) 20 MG tablet Take 1 tablet by mouth nightly 90 tablet 3    digoxin (DIGOX) 250 MCG tablet Take 1 tablet by mouth daily 90 tablet 3    furosemide (LASIX) 40 MG tablet TAKE 1 TABLET BY MOUTH DAILY 90 tablet 3    carvedilol (COREG) 6.25 MG tablet Take 1 tablet by mouth 2 times daily 180 tablet 3    LORazepam (ATIVAN) 1 MG tablet Take 1 tablet by mouth nightly as needed for Anxiety for up to 60 days.  60 tablet 3    allopurinol (ZYLOPRIM) 100 MG tablet TAKE 1 TABLET BY MOUTH EVERY MORNING 90 tablet 3    donepezil (ARICEPT) 5 MG tablet TAKE 1 TABLET BY MOUTH EVERY NIGHT 90 tablet 5    donepezil (ARICEPT) 5 MG tablet TAKE 1 TABLET BY MOUTH EVERY NIGHT 30 tablet 11    ENTRESTO 49-51 MG per tablet Take 1 tablet by mouth 2 times daily 112 tablet 0    warfarin (COUMADIN) 5 MG tablet Take as directed by [facility]. 135 tablets = 90 days 135 tablet 3    loperamide (IMODIUM) 2 MG capsule Take 2 mg by mouth See Admin Instructions Indications: Diarrhea      Multiple Vitamins-Minerals (THERAPEUTIC MULTIVITAMIN-MINERALS) tablet Take 1 tablet by mouth 2 times daily Indications: Nutritional Support Noon and Supper      acetaminophen (TYLENOL) 325 MG tablet Take 650 mg by mouth every 6 hours as needed for Pain or Fever Indications: Pain Don't take more than 3,000 mg per day      Calcium Carbonate-Vitamin D (CALCIUM + D PO) Take 1 tablet by mouth 2 times daily Indications: Treatment to Prevent Vitamin Deficiency Noon and Supper      aspirin 81 MG EC tablet Take 81 mg by mouth nightly Indications: Anticoagulant Therapy With food.  [DISCONTINUED] LORazepam (ATIVAN) 1 MG tablet Take 1 tablet by mouth nightly as needed for Anxiety . 60 tablet 3    [DISCONTINUED] furosemide (LASIX) 40 MG tablet TAKE 1 TABLET BY MOUTH DAILY 30 tablet 5    [DISCONTINUED] carvedilol (COREG) 6.25 MG tablet Take 1 tablet by mouth 2 times daily 60 tablet 5    [DISCONTINUED] allopurinol (ZYLOPRIM) 100 MG tablet TAKE 1 TABLET BY MOUTH EVERY MORNING 90 tablet 3    [DISCONTINUED] atorvastatin (LIPITOR) 20 MG tablet Take 1 tablet by mouth nightly 30 tablet 5    [DISCONTINUED] digoxin (DIGOX) 250 MCG tablet Take 1 tablet by mouth daily 30 tablet 5     No facility-administered encounter medications on file as of 1/22/2018. DIAGNOSES  1.  Chronic systolic congestive heart failure (HCC)  digoxin (DIGOX) 250 MCG tablet    furosemide (LASIX) 40 MG

## 2018-01-29 RX ORDER — SACUBITRIL AND VALSARTAN 49; 51 MG/1; MG/1
1 TABLET, FILM COATED ORAL 2 TIMES DAILY
Qty: 112 TABLET | Refills: 0 | COMMUNITY
Start: 2018-01-22 | End: 2018-04-09 | Stop reason: SDUPTHER

## 2018-01-30 ENCOUNTER — NURSE ONLY (OUTPATIENT)
Dept: CARDIOLOGY CLINIC | Age: 83
End: 2018-01-30
Payer: MEDICARE

## 2018-01-30 DIAGNOSIS — Z95.810 AUTOMATIC IMPLANTABLE CARDIOVERTER-DEFIBRILLATOR IN SITU: Primary | ICD-10-CM

## 2018-01-30 PROCEDURE — 93282 PRGRMG EVAL IMPLANTABLE DFB: CPT | Performed by: INTERNAL MEDICINE

## 2018-02-14 ENCOUNTER — HOSPITAL ENCOUNTER (EMERGENCY)
Age: 83
Discharge: HOME | End: 2018-02-14
Attending: FAMILY MEDICINE
Payer: MEDICARE

## 2018-02-14 VITALS
OXYGEN SATURATION: 99 % | SYSTOLIC BLOOD PRESSURE: 137 MMHG | DIASTOLIC BLOOD PRESSURE: 63 MMHG | RESPIRATION RATE: 19 BRPM | HEIGHT: 70 IN | BODY MASS INDEX: 25.77 KG/M2 | TEMPERATURE: 98.5 F | WEIGHT: 180 LBS | HEART RATE: 61 BPM

## 2018-02-14 DIAGNOSIS — S81.811A NONINFECTED SKIN TEAR OF RIGHT LOWER EXTREMITY, INITIAL ENCOUNTER: Primary | ICD-10-CM

## 2018-02-14 LAB
ANION GAP SERPL CALCULATED.3IONS-SCNC: 12 MEQ/L (ref 8–16)
ANISOCYTOSIS: ABNORMAL
BASOPHILS # BLD: 0.2 %
BASOPHILS ABSOLUTE: 0 THOU/MM3 (ref 0–0.1)
BUN BLDV-MCNC: 8 MG/DL (ref 7–22)
CALCIUM SERPL-MCNC: 9.3 MG/DL (ref 8.5–10.5)
CHLORIDE BLD-SCNC: 101 MEQ/L (ref 98–111)
CO2: 27 MEQ/L (ref 23–33)
CREAT SERPL-MCNC: 0.9 MG/DL (ref 0.4–1.2)
EKG ATRIAL RATE: 69 BPM
EKG Q-T INTERVAL: 482 MS
EKG QRS DURATION: 202 MS
EKG QTC CALCULATION (BAZETT): 505 MS
EKG R AXIS: -77 DEGREES
EKG T AXIS: 93 DEGREES
EKG VENTRICULAR RATE: 66 BPM
EOSINOPHIL # BLD: 0.9 %
EOSINOPHILS ABSOLUTE: 0.1 THOU/MM3 (ref 0–0.4)
GFR SERPL CREATININE-BSD FRML MDRD: 80 ML/MIN/1.73M2
GLUCOSE BLD-MCNC: 89 MG/DL (ref 70–108)
HCT VFR BLD CALC: 33.6 % (ref 42–52)
HEMOGLOBIN: 10.8 GM/DL (ref 14–18)
HYPOCHROMIA: ABNORMAL
INR BLD: 1.97 (ref 0.85–1.13)
LYMPHOCYTES # BLD: 22.2 %
LYMPHOCYTES ABSOLUTE: 1.5 THOU/MM3 (ref 1–4.8)
MCH RBC QN AUTO: 26.5 PG (ref 27–31)
MCHC RBC AUTO-ENTMCNC: 32.2 GM/DL (ref 33–37)
MCV RBC AUTO: 82.4 FL (ref 80–94)
MONOCYTES # BLD: 7.7 %
MONOCYTES ABSOLUTE: 0.5 THOU/MM3 (ref 0.4–1.3)
NUCLEATED RED BLOOD CELLS: 0 /100 WBC
OSMOLALITY CALCULATION: 277.2 MOSMOL/KG (ref 275–300)
PDW BLD-RTO: 16.3 % (ref 11.5–14.5)
PLATELET # BLD: 193 THOU/MM3 (ref 130–400)
PMV BLD AUTO: 8.9 FL (ref 7.4–10.4)
POTASSIUM SERPL-SCNC: 4.2 MEQ/L (ref 3.5–5.2)
RBC # BLD: 4.08 MILL/MM3 (ref 4.7–6.1)
SEG NEUTROPHILS: 69 %
SEGMENTED NEUTROPHILS ABSOLUTE COUNT: 4.8 THOU/MM3 (ref 1.8–7.7)
SODIUM BLD-SCNC: 140 MEQ/L (ref 135–145)
TROPONIN T: < 0.01 NG/ML
WBC # BLD: 6.9 THOU/MM3 (ref 4.8–10.8)

## 2018-02-14 PROCEDURE — 36415 COLL VENOUS BLD VENIPUNCTURE: CPT

## 2018-02-14 PROCEDURE — 93005 ELECTROCARDIOGRAM TRACING: CPT | Performed by: FAMILY MEDICINE

## 2018-02-14 PROCEDURE — 84484 ASSAY OF TROPONIN QUANT: CPT

## 2018-02-14 PROCEDURE — 85610 PROTHROMBIN TIME: CPT

## 2018-02-14 PROCEDURE — 80048 BASIC METABOLIC PNL TOTAL CA: CPT

## 2018-02-14 PROCEDURE — 99284 EMERGENCY DEPT VISIT MOD MDM: CPT

## 2018-02-14 PROCEDURE — 85025 COMPLETE CBC W/AUTO DIFF WBC: CPT

## 2018-02-14 ASSESSMENT — ENCOUNTER SYMPTOMS
WHEEZING: 0
NAUSEA: 0
EYE REDNESS: 0
ABDOMINAL PAIN: 0
SORE THROAT: 0
SHORTNESS OF BREATH: 0
EYE DISCHARGE: 0
DIARRHEA: 0
COUGH: 0
BACK PAIN: 0
RHINORRHEA: 0
VOMITING: 0

## 2018-02-14 NOTE — ED PROVIDER NOTES
[facility]  eMERGENCY dEPARTMENT eNCOUnter          CHIEF COMPLAINT       Chief Complaint   Patient presents with    Other     right shin bleeding       Nurses Notes reviewed and I agree except as noted in the HPI. HISTORY OF PRESENT ILLNESS    Shameka Mcconnell is a 80 y.o. male who presents to the Emergency Department for the evaluation of bleeding from his right shin since yesterday. Patient denies injury or falling. He denies pain or dizziness. Patient is on Coumadin due to an open heart surgery in the past. Patient has a defibrillator. Patient's last INR check was 1 month ago. Patient has a history of A-fib. Nurse reports that the patient complained of dizziness. Per patient, he denies having dizziness today or yesterday. The HPI was provided by the patient. REVIEW OF SYSTEMS     Review of Systems   Constitutional: Negative for appetite change, chills, fatigue and fever. HENT: Negative for congestion, ear pain, rhinorrhea and sore throat. Eyes: Negative for discharge, redness and visual disturbance. Respiratory: Negative for cough, shortness of breath and wheezing. Cardiovascular: Negative for chest pain, palpitations and leg swelling. Gastrointestinal: Negative for abdominal pain, diarrhea, nausea and vomiting. Genitourinary: Negative for decreased urine volume, difficulty urinating and dysuria. Musculoskeletal: Negative for arthralgias, back pain, joint swelling and neck pain. Bleeding from right shin. Skin: Negative for pallor and rash. Allergic/Immunologic: Negative for environmental allergies. Neurological: Negative for dizziness, syncope, weakness, light-headedness and headaches. Hematological: Negative for adenopathy. Psychiatric/Behavioral: Negative for agitation, confusion, dysphoric mood and suicidal ideas. The patient is not nervous/anxious.         PAST MEDICAL HISTORY    has a past medical history of Aortic stenosis; CAD (coronary

## 2018-02-14 NOTE — ED NOTES
Patient presents to ED with complaint of right shin area bleeding. No known injury to the area. He is on Coumadin.      Olivia Aviles, HEIDY  37/70/87 1011

## 2018-02-16 ENCOUNTER — OFFICE VISIT (OUTPATIENT)
Dept: FAMILY MEDICINE CLINIC | Age: 83
End: 2018-02-16
Payer: MEDICARE

## 2018-02-16 VITALS
HEART RATE: 83 BPM | RESPIRATION RATE: 14 BRPM | WEIGHT: 181.2 LBS | TEMPERATURE: 98.3 F | DIASTOLIC BLOOD PRESSURE: 74 MMHG | HEIGHT: 65 IN | SYSTOLIC BLOOD PRESSURE: 124 MMHG | BODY MASS INDEX: 30.19 KG/M2

## 2018-02-16 DIAGNOSIS — S81.811A SKIN TEAR OF RIGHT LOWER LEG WITHOUT COMPLICATION, INITIAL ENCOUNTER: Primary | ICD-10-CM

## 2018-02-16 PROCEDURE — G8427 DOCREV CUR MEDS BY ELIG CLIN: HCPCS | Performed by: FAMILY MEDICINE

## 2018-02-16 PROCEDURE — G8598 ASA/ANTIPLAT THER USED: HCPCS | Performed by: FAMILY MEDICINE

## 2018-02-16 PROCEDURE — 4040F PNEUMOC VAC/ADMIN/RCVD: CPT | Performed by: FAMILY MEDICINE

## 2018-02-16 PROCEDURE — G8484 FLU IMMUNIZE NO ADMIN: HCPCS | Performed by: FAMILY MEDICINE

## 2018-02-16 PROCEDURE — 99213 OFFICE O/P EST LOW 20 MIN: CPT | Performed by: FAMILY MEDICINE

## 2018-02-16 PROCEDURE — 1036F TOBACCO NON-USER: CPT | Performed by: FAMILY MEDICINE

## 2018-02-16 PROCEDURE — 1123F ACP DISCUSS/DSCN MKR DOCD: CPT | Performed by: FAMILY MEDICINE

## 2018-02-16 PROCEDURE — G8417 CALC BMI ABV UP PARAM F/U: HCPCS | Performed by: FAMILY MEDICINE

## 2018-02-16 NOTE — PATIENT INSTRUCTIONS
immediate medical care if:  ? · You have signs of infection, such as:  ¨ Increased pain, swelling, warmth, or redness around the tear. ¨ Red streaks leading from the tear. ¨ Pus draining from the tear. ¨ A fever. ? · The tear starts to bleed a lot. Small amounts of blood are normal.   ? Watch closely for changes in your health, and be sure to contact your doctor if:  ? · You do not get better as expected. Where can you learn more? Go to [email or web address]. org and sign in to your [employer] account. Enter R125 in the [employer] box to learn more about \"Skin Tears: Care Instructions. \"     If you do not have an account, please click on the \"Sign Up Now\" link. Current as of: March 20, 2017  Content Version: 11.5  © 4147-8671 Healthwise, Incorporated. Care instructions adapted under license by [facility] ([facility]). If you have questions about a medical condition or this instruction, always ask your healthcare professional. [place] [address] any warranty or liability for your use of this information.

## 2018-02-16 NOTE — PROGRESS NOTES
pain    VS Reviewed  General Appearance: A&O x 3, No acute distress,well developed and well- nourished  Eyes: pupils equal, round, and reactive to light, extraocular eye movements intact, conjunctivae and eye lids without erythema  ENT: external ear and ear canal clear bilaterally, TMs intact and regular, nose without deformity, nasal mucosa and turbinates normal without polyps, oropharynx normal, dentition is normal for age  Neck: supple and non-tender without mass, no thyromegaly or thyroid nodules, no cervical lymphadenopathy  Pulmonary/Chest: clear to auscultation bilaterally- no wheezes, rales or rhonchi, normal air movement, no respiratory distress or retractions  Cardiovascular: S1 and S2 auscultated w/ RRR. No murmurs, rubs, clicks, or gallops, distal pulses intact. Abdomen: soft, non-tender, non-distended, bowel sounds physiologic,  no rebound or guarding, no masses or hernias noted. Liver and spleen without enlargement. Extremities: no cyanosis, clubbing or edema of the lower extremities. Skin: warm and dry, with superficial skin tear anterior R shin. No bleeding. Healing well. Lab Results   Component Value Date    INR 1.97 (H) 02/14/2018    INR 2.00 (H) 01/16/2018    INR 2.70 (H) 12/12/2017       ASSESSMENT & PLAN  1. Skin tear of right lower leg without complication, initial encounter    Healing well  No bleeding  May just use band-aid at this point until healed  Monitor for bleeding  Reviewed ER precautions, pt understands. DISPOSITION    Return if symptoms worsen or fail to improve. Maurice Anila released without restrictions.      Future Appointments  Date Time Provider Jose Yuan   2/20/2018 10:00 AM Warren Moore RN [facility] [place]   2/27/2018 1:30 PM Carolyne Molina MD [address] [facility] [place]   5/3/2018 1:00 PM SCHEDULE, [facility] PACER NURSE [facility] [place]   5/21/2018 9:45 AM James Varela MD [facility] [place]   11/21/2018 10:20 AM Kailash New DO [facility] [address] [facility]    Patient given educational materials on: See Attached    Barriers to learning and self management: none    Discussed use, benefit, and side effects of prescribed medications. Barriers to medication compliance addressed. All patient questions answered. Pt voiced understanding.        Electronically signed by Anabell Lu DO on 2/16/2018 at 11:26 AM

## 2018-02-20 ENCOUNTER — HOSPITAL ENCOUNTER (OUTPATIENT)
Dept: PHARMACY | Age: 83
Setting detail: THERAPIES SERIES
Discharge: HOME | End: 2018-02-20
Payer: MEDICARE

## 2018-02-20 DIAGNOSIS — I48.19 PERSISTENT ATRIAL FIBRILLATION (HCC): ICD-10-CM

## 2018-02-20 LAB — POC INR: 1.9 (ref 0.8–1.2)

## 2018-02-20 PROCEDURE — 36416 COLLJ CAPILLARY BLOOD SPEC: CPT

## 2018-02-20 PROCEDURE — 85610 PROTHROMBIN TIME: CPT

## 2018-02-20 PROCEDURE — 99211 OFF/OP EST MAY X REQ PHY/QHP: CPT

## 2018-02-20 ASSESSMENT — ENCOUNTER SYMPTOMS
SHORTNESS OF BREATH: 0
DIARRHEA: 0
BLOOD IN STOOL: 0
CONSTIPATION: 0

## 2018-03-06 ENCOUNTER — HOSPITAL ENCOUNTER (OUTPATIENT)
Dept: PHARMACY | Age: 83
Setting detail: THERAPIES SERIES
Discharge: HOME | End: 2018-03-06
Payer: MEDICARE

## 2018-03-06 ENCOUNTER — OFFICE VISIT (OUTPATIENT)
Dept: CARDIOLOGY CLINIC | Age: 83
End: 2018-03-06
Payer: MEDICARE

## 2018-03-06 VITALS
SYSTOLIC BLOOD PRESSURE: 159 MMHG | WEIGHT: 185 LBS | DIASTOLIC BLOOD PRESSURE: 85 MMHG | HEIGHT: 70 IN | BODY MASS INDEX: 26.48 KG/M2 | HEART RATE: 86 BPM

## 2018-03-06 DIAGNOSIS — I48.19 PERSISTENT ATRIAL FIBRILLATION (HCC): ICD-10-CM

## 2018-03-06 DIAGNOSIS — Z95.810 AUTOMATIC IMPLANTABLE CARDIOVERTER-DEFIBRILLATOR IN SITU: ICD-10-CM

## 2018-03-06 DIAGNOSIS — I49.5 SSS (SICK SINUS SYNDROME) (HCC): Primary | ICD-10-CM

## 2018-03-06 DIAGNOSIS — I25.5 ISCHEMIC CARDIOMYOPATHY: ICD-10-CM

## 2018-03-06 LAB — POC INR: 2 (ref 0.8–1.2)

## 2018-03-06 PROCEDURE — 85610 PROTHROMBIN TIME: CPT

## 2018-03-06 PROCEDURE — 1123F ACP DISCUSS/DSCN MKR DOCD: CPT | Performed by: INTERNAL MEDICINE

## 2018-03-06 PROCEDURE — 36416 COLLJ CAPILLARY BLOOD SPEC: CPT

## 2018-03-06 PROCEDURE — G8598 ASA/ANTIPLAT THER USED: HCPCS | Performed by: INTERNAL MEDICINE

## 2018-03-06 PROCEDURE — 4040F PNEUMOC VAC/ADMIN/RCVD: CPT | Performed by: INTERNAL MEDICINE

## 2018-03-06 PROCEDURE — 99213 OFFICE O/P EST LOW 20 MIN: CPT | Performed by: INTERNAL MEDICINE

## 2018-03-06 PROCEDURE — G8417 CALC BMI ABV UP PARAM F/U: HCPCS | Performed by: INTERNAL MEDICINE

## 2018-03-06 PROCEDURE — G8484 FLU IMMUNIZE NO ADMIN: HCPCS | Performed by: INTERNAL MEDICINE

## 2018-03-06 PROCEDURE — 99211 OFF/OP EST MAY X REQ PHY/QHP: CPT

## 2018-03-06 PROCEDURE — 93000 ELECTROCARDIOGRAM COMPLETE: CPT | Performed by: INTERNAL MEDICINE

## 2018-03-06 PROCEDURE — 1036F TOBACCO NON-USER: CPT | Performed by: INTERNAL MEDICINE

## 2018-03-06 PROCEDURE — G8427 DOCREV CUR MEDS BY ELIG CLIN: HCPCS | Performed by: INTERNAL MEDICINE

## 2018-03-06 ASSESSMENT — ENCOUNTER SYMPTOMS
BACK PAIN: 0
LEFT EYE: 0
COUGH: 0
SORE THROAT: 0
ABDOMINAL PAIN: 0
CONSTIPATION: 0
BLURRED VISION: 0
VOMITING: 0
SHORTNESS OF BREATH: 0
DOUBLE VISION: 0
SHORTNESS OF BREATH: 0
WHEEZING: 0
NAUSEA: 0
DIARRHEA: 0
RIGHT EYE: 0
BLOOD IN STOOL: 0
DIARRHEA: 0
CONSTIPATION: 0

## 2018-03-06 NOTE — PROGRESS NOTES
CARDIOLOGY - ELECTROPHYSIOLOGY  PROGRESS NOTE    Date:   3/6/2018  Patient name: Rl Vázquez  Date of admission:  No admission date for patient encounter. MRN:   [id number]  YOB: 1933  PCP: Olga Hernandez DO    Subjective:  I am doing ok. 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 He is s/p 3V CABG as well as AVR and MV annuloplasty in 2008. The patient has been doing very well from a cardiac standpoint. He has not had any recent heart failure exacerbations or episodes of angina. 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 He has a single chamber ICD and has had ATP for VT. The patient's last episode was in June. The patient has permanent atrial fibrillation secondary to valvular heart disease. The patient has been seen by Dr. Nasario Cockayne in the past and the patient is unhappy with his care. He is going to continue to have Dr. Polo Schuler manage his cardiac issues, and I am being asked to see the patient for his h/o atrial fibrillation and ventricular tachycardia. 03/06/2018: The patient was seen in the ED last month for bleeding from his leg secondary to severely dry skin and taking coumadin. The patient's wife asked if he still needs to be taking the [facility]. She said it is very expensive. He denies having any heart failure symptoms.       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    Medtronic single ICD 9/22/2011    Persistent atrial fibrillation ([address] 2/11/2013    SSS (sick sinus syndrome) ([address]     Status post pacemaker     Past Surgical History:      Procedure Laterality Date    COLONOSCOPY 9/21/2010    Dr Rucker [place] GRAFT  2008    [facility]    EYE SURGERY      PACEMAKER INSERTION       Past Family History:       Problem Relation Age of Onset    Heart Disease Mother     Heart Disease Father       Past Social History:     Social History     Social History    Marital status:      Spouse name: N/A    Number of children: N/A    Years of education: N/A     Social History Main Topics    Smoking status: Former Smoker     Packs/day: 1.00     Years: 50.00     Types: Cigarettes, Cigars     Quit date: 5/1/2008    Smokeless tobacco: Never Used    Alcohol use 0.0 oz/week      Comment: RARELY    Drug use: No    Sexual activity: Not Asked     Other Topics Concern    None     Social History Narrative    None       Medications:   Scheduled Meds:  Continuous Infusions:  CBC: No results for input(s): WBC, HGB, PLT in the last 72 hours. BMP:  No results for input(s): NA, K, CL, CO2, BUN, CREATININE, GLUCOSE in the last 72 hours. Hepatic: No results for input(s): AST, ALT, ALB, BILITOT, ALKPHOS in the last 72 hours. Troponin: No results for input(s): TROPONINI in the last 72 hours. BNP: No results for input(s): BNP in the last 72 hours. Lipids: No results for input(s): CHOL, HDL in the last 72 hours. Invalid input(s): LDLCALCU  INR:   Recent Labs      03/06/18   1021   INR  2.00*       Objective:   REVIEW OF SYSTEMS:    Review of Systems   Constitution: Negative for fever, weight gain and weight loss. HENT: Negative for hearing loss and sore throat. Eyes: Negative for blurred vision, double vision, vision loss in left eye and vision loss in right eye. Cardiovascular: Positive for dyspnea on exertion. Negative for chest pain, irregular heartbeat, near-syncope, palpitations and syncope. Respiratory: Negative for cough, shortness of breath and wheezing. Endocrine: Negative for cold intolerance, heat intolerance and polyuria. Hematologic/Lymphatic: Negative for bleeding problem. Does not bruise/bleed easily. Skin: Negative for dry skin, itching and rash. Musculoskeletal: Negative for arthritis, back pain, joint pain and myalgias. Gastrointestinal: Negative for abdominal pain, constipation, diarrhea, nausea and vomiting. Genitourinary: Negative for dysuria, frequency, hematuria and urgency. Neurological: Negative for dizziness, headaches, light-headedness, numbness, paresthesias, seizures and vertigo. Psychiatric/Behavioral: Negative for depression and memory loss. The patient is not nervous/anxious. PHYSICAL EXAM:  BP (!) 159/85   Pulse 86   Ht 5' 10\" (1.778 m)   Wt 185 lb (83.9 kg)   BMI 26.54 kg/m²     Physical Exam   Constitutional: He is oriented to person, place, and time. No distress. HENT:   Head: Normocephalic and atraumatic. Head is without contusion. Right Ear: Hearing and external ear normal. No decreased hearing is noted. Left Ear: Hearing and external ear normal. No decreased hearing is noted. Nose: Nose normal. No sinus tenderness. No epistaxis. Mouth/Throat: Oropharynx is clear and moist. Mucous membranes are not dry. No oropharyngeal exudate. Eyes: Conjunctivae and EOM are normal. Pupils are equal, round, and reactive to light. Right eye exhibits no discharge. Left eye exhibits no discharge. Neck: Trachea normal. Neck supple. No JVD present. No edema present. No thyroid mass present. Cardiovascular: Normal rate, regular rhythm, normal heart sounds and normal pulses. No extrasystoles are present. Pulmonary/Chest: Effort normal and breath sounds normal. He exhibits no tenderness. Breasts are symmetrical.   Abdominal: Soft. Normal appearance and bowel sounds are normal. He exhibits no mass. There is no hepatosplenomegaly. There is no tenderness. No hernia. Musculoskeletal: Normal range of motion. Right shoulder: Normal. He exhibits normal range of motion and no swelling. Left shoulder: Normal. He exhibits normal range of motion and no swelling. Right hip: Normal. He exhibits normal range of motion and no swelling. Left hip: Normal. He exhibits normal range of motion and no swelling. Right knee: Normal. He exhibits normal range of motion and no swelling. Left knee: Normal. He exhibits normal range of motion and no swelling. Right upper arm: Normal.        Left upper arm: Normal.        Right upper leg: Normal.        Left upper leg: Normal.        Right lower leg: Normal.        Left lower leg: Normal.   Lymphadenopathy:        Head (right side): No submandibular adenopathy present. Head (left side): No submandibular adenopathy present. Neurological: He is alert and oriented to person, place, and time. He has normal strength. He displays normal reflexes. No cranial nerve deficit or sensory deficit. Coordination and gait normal.   Skin: Skin is warm and dry. No lesion and no rash noted. He is not diaphoretic. No cyanosis. Nails show no clubbing. Psychiatric: His speech is normal and behavior is normal. Judgment and thought content normal. His mood appears not anxious. His affect is not angry. Cognition and memory are normal. He does not exhibit a depressed mood. Nursing note and vitals reviewed.       DIAGNOSTIC STUDIES & LABORATORY DATA:     I have personally reviewed and interpreted the results of the following diagnostic testing  CARDIAC HISTORY:  CATH: 10/13/2016 AVR, MV annuloplasty, 3V CABG LIMA LAD, SVG RCA, SVG OM  ECHO: 10/11/2016  Summary   Left ventricle size is normal.   Mild concentric left ventricular hypertrophy.   Ejection fraction is visually estimated in the range of 50% to 55%.   There were no regional wall motion abnormalities.   The left atrium is Moderately dilated.   Normal right ventricular size and function.   Right ventricular systolic pressure was normal.   The aortic valve appears to be trileaflet with good leaflet separation.   Mitral annular calcification is present.   Calcification of the mitral valve noted.   No evidence of mitral valve stenosis.   Mild mitral regurgitation with centrally directed jet. ECG: 10/26/2017 Ventricular pacing with underlying atrial fibrillation. STRESS TEST: 06/17/2016  Summary   Lexiscan EKG stress test is non diagnostic for ischemia.   Calculated gated LVEF 45 %.   The T.I.D. ratio was 1.23 .   There is mild attenuation artifact noted in the apex and inferior wall   seems to be related to bowel artifact.   There was no evidence of definite reversible tracer uptake.   The nuclear images is not suggestive for myocardial ischemia. ICD ANALYSIS:  10/25/2017  No episodes since June    Assessment / Acute Cardiac Problems:   1. ASCVD native vessels without angina: The patient is s/p 3V CABG in 2008. He is doing well and denies having any angina or anginal equivalents. 2. Ischemic Cardiomyopathy:  The patient's EF has normalized with revascularization and medical therapy. 3. AICD in situ:  Device interrogation yesterday showed normal function and no episodes of VT since June  4. VT:  No recurrences since June. I will continue to monitor. Will hold off on antiarrhythmic medication at this time. 5. AVR and MV annuloplasty:  S/o valve surgery in 2008. ECHO in 2016 showed normal function. I will repeat ECHO after next visit. 6. Permanent atrial fibrillation:  Secondary to mitral valve disease.   Ventricular rate is controlled and he is on warfarin.       Patient Active Problem List:     CAD (coronary artery disease)     Aortic stenosis     SSS (sick sinus syndrome) (HCC)     Hyperlipidemia     Kaposi's sarcoma ([address]     S/P CABG (coronary artery bypass graft)     Medtronic single ICD     GERD (gastroesophageal reflux disease)     Hyperuricemia     HTN (hypertension)     S/P MVR (mitral valve repair), Physio ring, 2008     Persistent atrial fibrillation ([address]     Pacemaker

## 2018-04-03 ENCOUNTER — HOSPITAL ENCOUNTER (OUTPATIENT)
Dept: PHARMACY | Age: 83
Setting detail: THERAPIES SERIES
Discharge: HOME | End: 2018-04-03
Payer: MEDICARE

## 2018-04-03 DIAGNOSIS — I48.19 PERSISTENT ATRIAL FIBRILLATION (HCC): ICD-10-CM

## 2018-04-03 LAB — POC INR: 2.4 (ref 0.8–1.2)

## 2018-04-03 PROCEDURE — 99211 OFF/OP EST MAY X REQ PHY/QHP: CPT

## 2018-04-03 PROCEDURE — 85610 PROTHROMBIN TIME: CPT

## 2018-04-03 PROCEDURE — 36416 COLLJ CAPILLARY BLOOD SPEC: CPT

## 2018-04-03 ASSESSMENT — ENCOUNTER SYMPTOMS
SHORTNESS OF BREATH: 1
DIARRHEA: 0
CONSTIPATION: 0
BLOOD IN STOOL: 0

## 2018-04-23 ENCOUNTER — NURSE ONLY (OUTPATIENT)
Dept: CARDIOLOGY CLINIC | Age: 83
End: 2018-04-23
Payer: MEDICARE

## 2018-04-23 DIAGNOSIS — Z95.810 AUTOMATIC IMPLANTABLE CARDIOVERTER-DEFIBRILLATOR IN SITU: Primary | ICD-10-CM

## 2018-04-23 PROCEDURE — 93282 PRGRMG EVAL IMPLANTABLE DFB: CPT | Performed by: INTERNAL MEDICINE

## 2018-05-03 ENCOUNTER — HOSPITAL ENCOUNTER (OUTPATIENT)
Dept: PHARMACY | Age: 83
Setting detail: THERAPIES SERIES
Discharge: HOME | End: 2018-05-03
Payer: MEDICARE

## 2018-05-03 DIAGNOSIS — I48.19 PERSISTENT ATRIAL FIBRILLATION (HCC): ICD-10-CM

## 2018-05-03 LAB — POC INR: 2.4 (ref 0.8–1.2)

## 2018-05-03 PROCEDURE — 36416 COLLJ CAPILLARY BLOOD SPEC: CPT

## 2018-05-03 PROCEDURE — 99211 OFF/OP EST MAY X REQ PHY/QHP: CPT

## 2018-05-03 PROCEDURE — 85610 PROTHROMBIN TIME: CPT

## 2018-05-21 ENCOUNTER — OFFICE VISIT (OUTPATIENT)
Dept: INTERNAL MEDICINE CLINIC | Age: 83
End: 2018-05-21
Payer: MEDICARE

## 2018-05-21 VITALS
DIASTOLIC BLOOD PRESSURE: 64 MMHG | BODY MASS INDEX: 26.2 KG/M2 | WEIGHT: 183 LBS | HEART RATE: 64 BPM | SYSTOLIC BLOOD PRESSURE: 116 MMHG | HEIGHT: 70 IN

## 2018-05-21 DIAGNOSIS — I50.22 CHRONIC SYSTOLIC CONGESTIVE HEART FAILURE (HCC): ICD-10-CM

## 2018-05-21 DIAGNOSIS — Z98.890 S/P MVR (MITRAL VALVE REPAIR): ICD-10-CM

## 2018-05-21 DIAGNOSIS — G31.84 MILD COGNITIVE IMPAIRMENT: ICD-10-CM

## 2018-05-21 DIAGNOSIS — E78.5 HYPERLIPIDEMIA, UNSPECIFIED HYPERLIPIDEMIA TYPE: ICD-10-CM

## 2018-05-21 DIAGNOSIS — I48.21 PERMANENT ATRIAL FIBRILLATION (HCC): ICD-10-CM

## 2018-05-21 DIAGNOSIS — Z95.810 AUTOMATIC IMPLANTABLE CARDIOVERTER-DEFIBRILLATOR IN SITU: ICD-10-CM

## 2018-05-21 DIAGNOSIS — Z95.2 S/P AVR (AORTIC VALVE REPLACEMENT): ICD-10-CM

## 2018-05-21 DIAGNOSIS — Z95.1 S/P CABG (CORONARY ARTERY BYPASS GRAFT): ICD-10-CM

## 2018-05-21 DIAGNOSIS — I25.10 ASCVD (ARTERIOSCLEROTIC CARDIOVASCULAR DISEASE): Primary | ICD-10-CM

## 2018-05-21 DIAGNOSIS — C46.9 KAPOSI'S SARCOMA (HCC): ICD-10-CM

## 2018-05-21 PROCEDURE — G8417 CALC BMI ABV UP PARAM F/U: HCPCS | Performed by: INTERNAL MEDICINE

## 2018-05-21 PROCEDURE — 4040F PNEUMOC VAC/ADMIN/RCVD: CPT | Performed by: INTERNAL MEDICINE

## 2018-05-21 PROCEDURE — 1123F ACP DISCUSS/DSCN MKR DOCD: CPT | Performed by: INTERNAL MEDICINE

## 2018-05-21 PROCEDURE — 93000 ELECTROCARDIOGRAM COMPLETE: CPT | Performed by: INTERNAL MEDICINE

## 2018-05-21 PROCEDURE — G8427 DOCREV CUR MEDS BY ELIG CLIN: HCPCS | Performed by: INTERNAL MEDICINE

## 2018-05-21 PROCEDURE — 1036F TOBACCO NON-USER: CPT | Performed by: INTERNAL MEDICINE

## 2018-05-21 PROCEDURE — G8598 ASA/ANTIPLAT THER USED: HCPCS | Performed by: INTERNAL MEDICINE

## 2018-05-21 PROCEDURE — 99214 OFFICE O/P EST MOD 30 MIN: CPT | Performed by: INTERNAL MEDICINE

## 2018-05-21 RX ORDER — LORAZEPAM 1 MG/1
1 TABLET ORAL DAILY PRN
COMMUNITY
End: 2018-09-17 | Stop reason: SDUPTHER

## 2018-05-21 RX ORDER — ATORVASTATIN CALCIUM 20 MG/1
20 TABLET, FILM COATED ORAL NIGHTLY
Qty: 90 TABLET | Refills: 3 | Status: SHIPPED | OUTPATIENT
Start: 2018-05-21 | End: 2019-06-06 | Stop reason: SDUPTHER

## 2018-05-21 RX ORDER — DONEPEZIL HYDROCHLORIDE 5 MG/1
TABLET, FILM COATED ORAL
Qty: 90 TABLET | Refills: 5 | Status: SHIPPED | OUTPATIENT
Start: 2018-05-21 | End: 2019-06-04 | Stop reason: SDUPTHER

## 2018-05-31 ENCOUNTER — HOSPITAL ENCOUNTER (OUTPATIENT)
Dept: PHARMACY | Age: 83
Setting detail: THERAPIES SERIES
Discharge: HOME | End: 2018-05-31
Payer: MEDICARE

## 2018-05-31 DIAGNOSIS — I48.19 PERSISTENT ATRIAL FIBRILLATION (HCC): ICD-10-CM

## 2018-05-31 LAB — POC INR: 2.5 (ref 0.8–1.2)

## 2018-05-31 PROCEDURE — 99211 OFF/OP EST MAY X REQ PHY/QHP: CPT

## 2018-05-31 PROCEDURE — 36416 COLLJ CAPILLARY BLOOD SPEC: CPT

## 2018-05-31 PROCEDURE — 85610 PROTHROMBIN TIME: CPT

## 2018-06-28 ENCOUNTER — HOSPITAL ENCOUNTER (OUTPATIENT)
Dept: PHARMACY | Age: 83
Setting detail: THERAPIES SERIES
Discharge: HOME | End: 2018-06-28
Payer: MEDICARE

## 2018-06-28 DIAGNOSIS — I48.19 PERSISTENT ATRIAL FIBRILLATION (HCC): ICD-10-CM

## 2018-06-28 LAB — POC INR: 2.8 (ref 0.8–1.2)

## 2018-06-28 PROCEDURE — 36416 COLLJ CAPILLARY BLOOD SPEC: CPT

## 2018-06-28 PROCEDURE — 99211 OFF/OP EST MAY X REQ PHY/QHP: CPT

## 2018-06-28 PROCEDURE — 85610 PROTHROMBIN TIME: CPT

## 2018-07-26 ENCOUNTER — NURSE ONLY (OUTPATIENT)
Dept: CARDIOLOGY CLINIC | Age: 83
End: 2018-07-26
Payer: MEDICARE

## 2018-07-26 ENCOUNTER — HOSPITAL ENCOUNTER (OUTPATIENT)
Dept: PHARMACY | Age: 83
Setting detail: THERAPIES SERIES
Discharge: HOME | End: 2018-07-26
Payer: MEDICARE

## 2018-07-26 DIAGNOSIS — Z95.810 AUTOMATIC IMPLANTABLE CARDIOVERTER-DEFIBRILLATOR IN SITU: Primary | ICD-10-CM

## 2018-07-26 DIAGNOSIS — I48.21 PERMANENT ATRIAL FIBRILLATION (HCC): ICD-10-CM

## 2018-07-26 DIAGNOSIS — I48.19 PERSISTENT ATRIAL FIBRILLATION (HCC): ICD-10-CM

## 2018-07-26 LAB — POC INR: 2.7 (ref 0.8–1.2)

## 2018-07-26 PROCEDURE — 99211 OFF/OP EST MAY X REQ PHY/QHP: CPT

## 2018-07-26 PROCEDURE — 93282 PRGRMG EVAL IMPLANTABLE DFB: CPT | Performed by: INTERNAL MEDICINE

## 2018-07-26 PROCEDURE — 85610 PROTHROMBIN TIME: CPT

## 2018-07-26 PROCEDURE — 36416 COLLJ CAPILLARY BLOOD SPEC: CPT

## 2018-07-26 RX ORDER — WARFARIN SODIUM 5 MG/1
TABLET ORAL
Qty: 135 TABLET | Refills: 3 | Status: SHIPPED | OUTPATIENT
Start: 2018-07-26 | End: 2019-09-30 | Stop reason: SDUPTHER

## 2018-07-26 NOTE — PROGRESS NOTES
Medication Management [facility]  Anticoagulation Clinic  [phone] (phone)  [phone] (fax)    Mr. Erica Caballero is a 80 y.o.  male with history of persistent Afib who presents today for anticoagulation monitoring and adjustment. Patient verifies current dosing regimen and tablet strength. No missed or extra doses. Patient denies s/s bleeding/bruising/swelling/SOB. Had one episode of fleeting chest pain. No blood in urine or stool. No dietary changes. No changes in medication/OTC agents/Herbals. No change in alcohol use or tobacco use. No change in activity level. Patient denies headaches/dizziness/lightheadedness/falls. No vomiting/diarrhea or acute illness. No procedures scheduled in the future at this time. Assessment:   Lab Results   Component Value Date    INR 2.70 (H) 07/26/2018    INR 2.80 (H) 06/28/2018    INR 2.50 (H) 05/31/2018     INR therapeutic   Recent Labs      07/26/18   0921   INR  2.70*     Plan: POCT INR ordered and result reviewed. Continue Coumadin 5 mg po MWF, 7.5 mg po TTHSS. Recheck INR 5 weeks. Patient reminded to call the Anticoagulation Clinic with any signs or symptoms of bleeding or with any medication changes. Patient given instructions utilizing the teach back method. Discharged ambulatory in no apparent distress with wife. After visit summary printed and reviewed with patient.       Medications reviewed and updated on home medication list Yes    Influenza vaccine:     [] given    [] declined   [x] received previously   [] plans to receive at a later time   [] refused    [x] documented in EPIC

## 2018-07-26 NOTE — PROGRESS NOTES
DR Xuan Matias PT  MEDTRONIC SINGLE ICD CHECK IN OFFICE  BATTERY 2.93 VOLTS REMAINING  CHRISTIANO 2.63  AFIB/COUMADIN  PROGRAMMED VVI   RV WAVES NOT MEASURED  PT IS DEPENDENT IN THE VENTRICLES  RV IMPEDENCE 361  RV 40  SVC 50  V PACED 96.7%  VENT THRESHOLD 1.75 @ 0.40  VENT AMPLITUDE PROGRAMMED AT 3 @ 4    DID ATTEMPT TO CHANGE VENT PULSE WIDTH OUT TO 0.6 AND 0.8 AND 1 TO MAKE THE VENT THRESHOLD BETTER BUT THE THRESHOLD DID NOT IMPROVE MUCH SO KEPT IT AT 0.40

## 2018-08-31 ENCOUNTER — HOSPITAL ENCOUNTER (OUTPATIENT)
Dept: PHARMACY | Age: 83
Setting detail: THERAPIES SERIES
Discharge: HOME | End: 2018-08-31
Payer: MEDICARE

## 2018-08-31 ENCOUNTER — HOSPITAL ENCOUNTER (OUTPATIENT)
Age: 83
Discharge: HOME | End: 2018-08-31
Payer: MEDICARE

## 2018-08-31 DIAGNOSIS — Z95.2 S/P AVR (AORTIC VALVE REPLACEMENT): ICD-10-CM

## 2018-08-31 DIAGNOSIS — Z79.01 ANTICOAGULATED ON COUMADIN: Primary | ICD-10-CM

## 2018-08-31 DIAGNOSIS — Z79.01 ANTICOAGULATED ON COUMADIN: ICD-10-CM

## 2018-08-31 DIAGNOSIS — I48.19 PERSISTENT ATRIAL FIBRILLATION (HCC): ICD-10-CM

## 2018-08-31 LAB
HCT VFR BLD CALC: 37.8 % (ref 42–52)
HEMOGLOBIN: 12.3 GM/DL (ref 14–18)
POC INR: 2.8 (ref 0.8–1.2)

## 2018-08-31 PROCEDURE — 99211 OFF/OP EST MAY X REQ PHY/QHP: CPT

## 2018-08-31 PROCEDURE — 36416 COLLJ CAPILLARY BLOOD SPEC: CPT

## 2018-08-31 PROCEDURE — 85014 HEMATOCRIT: CPT

## 2018-08-31 PROCEDURE — 85018 HEMOGLOBIN: CPT

## 2018-08-31 PROCEDURE — 36415 COLL VENOUS BLD VENIPUNCTURE: CPT

## 2018-08-31 PROCEDURE — 85610 PROTHROMBIN TIME: CPT

## 2018-09-11 ENCOUNTER — OFFICE VISIT (OUTPATIENT)
Dept: CARDIOLOGY CLINIC | Age: 83
End: 2018-09-11
Payer: MEDICARE

## 2018-09-11 VITALS
DIASTOLIC BLOOD PRESSURE: 76 MMHG | SYSTOLIC BLOOD PRESSURE: 138 MMHG | HEIGHT: 68 IN | HEART RATE: 67 BPM | BODY MASS INDEX: 26.52 KG/M2 | WEIGHT: 175 LBS

## 2018-09-11 DIAGNOSIS — E78.5 HYPERLIPIDEMIA, UNSPECIFIED HYPERLIPIDEMIA TYPE: ICD-10-CM

## 2018-09-11 DIAGNOSIS — I49.5 SSS (SICK SINUS SYNDROME) (HCC): Primary | ICD-10-CM

## 2018-09-11 DIAGNOSIS — I25.10 CORONARY ARTERY DISEASE INVOLVING NATIVE CORONARY ARTERY OF NATIVE HEART WITHOUT ANGINA PECTORIS: ICD-10-CM

## 2018-09-11 DIAGNOSIS — I48.19 PERSISTENT ATRIAL FIBRILLATION (HCC): ICD-10-CM

## 2018-09-11 PROCEDURE — 99213 OFFICE O/P EST LOW 20 MIN: CPT | Performed by: INTERNAL MEDICINE

## 2018-09-11 PROCEDURE — G8417 CALC BMI ABV UP PARAM F/U: HCPCS | Performed by: INTERNAL MEDICINE

## 2018-09-11 PROCEDURE — 93000 ELECTROCARDIOGRAM COMPLETE: CPT | Performed by: INTERNAL MEDICINE

## 2018-09-11 PROCEDURE — 1123F ACP DISCUSS/DSCN MKR DOCD: CPT | Performed by: INTERNAL MEDICINE

## 2018-09-11 PROCEDURE — G8598 ASA/ANTIPLAT THER USED: HCPCS | Performed by: INTERNAL MEDICINE

## 2018-09-11 PROCEDURE — G8427 DOCREV CUR MEDS BY ELIG CLIN: HCPCS | Performed by: INTERNAL MEDICINE

## 2018-09-11 PROCEDURE — 1036F TOBACCO NON-USER: CPT | Performed by: INTERNAL MEDICINE

## 2018-09-11 PROCEDURE — 1101F PT FALLS ASSESS-DOCD LE1/YR: CPT | Performed by: INTERNAL MEDICINE

## 2018-09-11 PROCEDURE — 4040F PNEUMOC VAC/ADMIN/RCVD: CPT | Performed by: INTERNAL MEDICINE

## 2018-09-11 ASSESSMENT — ENCOUNTER SYMPTOMS
COUGH: 0
WHEEZING: 0
RIGHT EYE: 0
ABDOMINAL PAIN: 0
VOMITING: 0
NAUSEA: 0
DIARRHEA: 0
SHORTNESS OF BREATH: 0
BLURRED VISION: 0
DOUBLE VISION: 0
LEFT EYE: 0
BACK PAIN: 0
CONSTIPATION: 0
SORE THROAT: 0

## 2018-09-11 NOTE — PROGRESS NOTES
CARDIOLOGY - ELECTROPHYSIOLOGY  PROGRESS NOTE    Date:   9/11/2018  Patient name: Nikkie Crow  Date of admission:  No admission date for patient encounter. MRN:   [id number]  YOB: 1933  PCP: Tyrell Horn DO    Subjective: I am doing well. 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 Park Barnes is s/p 3V CABG as well as AVR and MV annuloplasty in 2008.  The patient has been doing very well from a cardiac standpoint. Park Barnes has not had any recent heart failure exacerbations or episodes of angina. 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 Park Barnes has a single chamber ICD and has had ATP for VT.  The patient's last episode was in June.  The patient has permanent atrial fibrillation secondary to valvular heart disease.  The patient has been seen by Dr. Kay Adams in the past and the patient is unhappy with his care. Park Barnes is going to continue to have Dr. Diony Collins manage his cardiac issues, and I am being asked to see the patient for his h/o atrial fibrillation and ventricular tachycardia.       03/06/2018: The patient was seen in the ED last month for bleeding from his leg secondary to severely dry skin and taking coumadin. The patient's wife asked if he still needs to be taking the [facility]. She said it is very expensive. He denies having any heart failure symptoms. 09/11/2018: The patient is doing well from a cardiac stand point. He reports having some chest pain after he wakes up in the morning and walks down the stairs to the kitchen. He states this has been unchanged for many years.       Past Medical History:      Diagnosis Date    Aortic stenosis     Statos post Aortic Valve replacement    CAD (coronary artery disease)     Status post bypass    GERD (gastroesophageal reflux disease)     Hyperlipidemia     Kaposi's sarcoma ([facility] [address] Negative for dyspnea on exertion, irregular heartbeat, near-syncope, palpitations and syncope. Respiratory: Negative for cough, shortness of breath and wheezing. Endocrine: Negative for cold intolerance, heat intolerance and polyuria. Hematologic/Lymphatic: Negative for bleeding problem. Does not bruise/bleed easily. Skin: Negative for dry skin, itching and rash. Musculoskeletal: Negative for arthritis, back pain, joint pain and myalgias. Gastrointestinal: Negative for abdominal pain, constipation, diarrhea, nausea and vomiting. Genitourinary: Negative for dysuria, frequency, hematuria and urgency. Neurological: Negative for dizziness, headaches, light-headedness, numbness, paresthesias, seizures and vertigo. Psychiatric/Behavioral: Negative for depression and memory loss. The patient is not nervous/anxious. PHYSICAL EXAM:  /76   Pulse 67   Ht 5' 8\" (1.727 m)   Wt 175 lb (79.4 kg)   BMI 26.61 kg/m²     Physical Exam   Constitutional: He is oriented to person, place, and time. No distress. HENT:   Head: Normocephalic and atraumatic. Head is without contusion. Right Ear: Hearing and external ear normal. No decreased hearing is noted. Left Ear: Hearing and external ear normal. No decreased hearing is noted. Nose: Nose normal. No sinus tenderness. No epistaxis. Mouth/Throat: Oropharynx is clear and moist. Mucous membranes are not dry. No oropharyngeal exudate. Eyes: Pupils are equal, round, and reactive to light. Conjunctivae and EOM are normal. Right eye exhibits no discharge. Left eye exhibits no discharge. Neck: Trachea normal. Neck supple. No JVD present. No edema present. No thyroid mass present. Cardiovascular: Normal rate, regular rhythm, normal heart sounds and normal pulses. No extrasystoles are present. Pulmonary/Chest: Effort normal and breath sounds normal. He exhibits no tenderness. Breasts are symmetrical.   Abdominal: Soft.  Normal appearance and List:     CAD (coronary artery disease)     Aortic stenosis     SSS (sick sinus syndrome) (HCC)     Hyperlipidemia     Kaposi's sarcoma ([address]     S/P CABG (coronary artery bypass graft)     Medtronic single ICD     GERD (gastroesophageal reflux disease)     Hyperuricemia     HTN (hypertension)     S/P MVR (mitral valve repair), Physio ring, 2008     Persistent atrial fibrillation (HCC)     Pacemaker     Dysphagia     Hoarseness     Laryngeal disorder     S/P AVR (aortic valve replacement)     Anticoagulated on Coumadin     Mild cognitive impairment      Plan of Treatment:   1. Continue current medications. 2. F/U in six months.

## 2018-09-17 ENCOUNTER — OFFICE VISIT (OUTPATIENT)
Dept: INTERNAL MEDICINE CLINIC | Age: 83
End: 2018-09-17
Payer: MEDICARE

## 2018-09-17 VITALS
DIASTOLIC BLOOD PRESSURE: 80 MMHG | OXYGEN SATURATION: 96 % | BODY MASS INDEX: 26.52 KG/M2 | WEIGHT: 175 LBS | HEART RATE: 76 BPM | SYSTOLIC BLOOD PRESSURE: 120 MMHG | HEIGHT: 68 IN

## 2018-09-17 DIAGNOSIS — Z95.1 S/P CABG (CORONARY ARTERY BYPASS GRAFT): ICD-10-CM

## 2018-09-17 DIAGNOSIS — Z98.890 S/P MVR (MITRAL VALVE REPAIR): ICD-10-CM

## 2018-09-17 DIAGNOSIS — Z95.810 AUTOMATIC IMPLANTABLE CARDIOVERTER-DEFIBRILLATOR IN SITU: ICD-10-CM

## 2018-09-17 DIAGNOSIS — E78.5 HYPERLIPIDEMIA, UNSPECIFIED HYPERLIPIDEMIA TYPE: ICD-10-CM

## 2018-09-17 DIAGNOSIS — I10 ESSENTIAL HYPERTENSION: ICD-10-CM

## 2018-09-17 DIAGNOSIS — I49.5 SSS (SICK SINUS SYNDROME) (HCC): ICD-10-CM

## 2018-09-17 DIAGNOSIS — I25.10 ASCVD (ARTERIOSCLEROTIC CARDIOVASCULAR DISEASE): ICD-10-CM

## 2018-09-17 DIAGNOSIS — C46.9 KAPOSI'S SARCOMA (HCC): ICD-10-CM

## 2018-09-17 DIAGNOSIS — I50.22 CHRONIC SYSTOLIC CONGESTIVE HEART FAILURE (HCC): Primary | ICD-10-CM

## 2018-09-17 DIAGNOSIS — G31.84 MILD COGNITIVE IMPAIRMENT: ICD-10-CM

## 2018-09-17 DIAGNOSIS — F41.9 ANXIETY: ICD-10-CM

## 2018-09-17 DIAGNOSIS — Z95.2 S/P AVR (AORTIC VALVE REPLACEMENT): ICD-10-CM

## 2018-09-17 PROCEDURE — 4040F PNEUMOC VAC/ADMIN/RCVD: CPT | Performed by: INTERNAL MEDICINE

## 2018-09-17 PROCEDURE — 1123F ACP DISCUSS/DSCN MKR DOCD: CPT | Performed by: INTERNAL MEDICINE

## 2018-09-17 PROCEDURE — G8598 ASA/ANTIPLAT THER USED: HCPCS | Performed by: INTERNAL MEDICINE

## 2018-09-17 PROCEDURE — G8417 CALC BMI ABV UP PARAM F/U: HCPCS | Performed by: INTERNAL MEDICINE

## 2018-09-17 PROCEDURE — 99214 OFFICE O/P EST MOD 30 MIN: CPT | Performed by: INTERNAL MEDICINE

## 2018-09-17 PROCEDURE — 1101F PT FALLS ASSESS-DOCD LE1/YR: CPT | Performed by: INTERNAL MEDICINE

## 2018-09-17 PROCEDURE — 1036F TOBACCO NON-USER: CPT | Performed by: INTERNAL MEDICINE

## 2018-09-17 PROCEDURE — G8427 DOCREV CUR MEDS BY ELIG CLIN: HCPCS | Performed by: INTERNAL MEDICINE

## 2018-09-17 RX ORDER — LORAZEPAM 1 MG/1
1 TABLET ORAL DAILY PRN
Qty: 60 TABLET | Refills: 5 | Status: SHIPPED | OUTPATIENT
Start: 2018-09-17 | End: 2019-03-16

## 2018-09-17 ASSESSMENT — PATIENT HEALTH QUESTIONNAIRE - PHQ9
SUM OF ALL RESPONSES TO PHQ QUESTIONS 1-9: 0
2. FEELING DOWN, DEPRESSED OR HOPELESS: 0
SUM OF ALL RESPONSES TO PHQ QUESTIONS 1-9: 0
1. LITTLE INTEREST OR PLEASURE IN DOING THINGS: 0
SUM OF ALL RESPONSES TO PHQ9 QUESTIONS 1 & 2: 0

## 2018-09-17 NOTE — PROGRESS NOTES
Chief Complaint   Patient presents with    Congestive Heart Failure       Patient presents for medical evaluation. I last saw the patient 4 months   ago. Patient is followed on a regular basis by Dr. Kristopher Arnold  Patient has not had any admissions to the hospital or change in medications since the last visit here. Pertinent past history reviewed and summarized below  He has an ICD with EF 25%   He has permanent atrial fibrillation, on warfarin. He had CABG, MR repair 2008    Patient says 2 years ago he felt a jolt in his chest.  He said his arm started shaking. He laid down, symptoms went away. 3 days later he went and got his defibrillator checked. He had an episode of anti-tachycardia pacing for ventricular tachycardia. He has had no problems since. He feels fine  I started coreg and had him stop driving  He is back to driving. Interrogation of his ICD shows 13 beats of ventricular tachycardia in June    He was admitted in October 2017 with chest pain. Dr. Yohannes Chiang did a cardiac catheterization which did show some CAD, not really amenable to intervention. Echo showed EF up to 50%. He is managed medically. He has done well  He went to the emergency room on Valentine's Day. He said he cut his left leg. Took a while for it to stop bleeding  He says he is doing well. Denies weight gain. No orthopnea or PND.   He says he no longer sees Dr. Yohannes Chiang  He is now seeing Dr. Gwen Riddle  Patient is worried that his pacemaker is running out of batteries  Patient Active Problem List   Diagnosis    CAD (coronary artery disease)    Aortic stenosis    SSS (sick sinus syndrome) ([address]    Hyperlipidemia    Kaposi's sarcoma ([address]    S/P CABG (coronary artery bypass graft)    Medtronic single ICD    GERD (gastroesophageal reflux disease)    Hyperuricemia    HTN (hypertension)    S/P MVR (mitral valve repair), Physio ring, 2008    Persistent atrial fibrillation ([address]    Pacemaker    Dysphagia    Hoarseness    Laryngeal disorder    S/P AVR (aortic valve replacement)    Anticoagulated on Coumadin    Mild cognitive impairment       Current Outpatient Prescriptions   Medication Sig Dispense Refill    LORazepam (ATIVAN) 1 MG tablet Take 1 tablet by mouth daily as needed for Anxiety for up to 180 days. . 60 tablet 5    sacubitril-valsartan (ENTRESTO) 49-51 MG per tablet Take 1 tablet by mouth 2 times daily 60 tablet 11    warfarin (COUMADIN) 5 MG tablet Take as directed by [facility]. 135 tablets = 90 days 135 tablet 3    donepezil (ARICEPT) 5 MG tablet TAKE 1 TABLET BY MOUTH EVERY NIGHT 90 tablet 5    atorvastatin (LIPITOR) 20 MG tablet Take 1 tablet by mouth nightly 90 tablet 3    Lidocaine-Methyl Sal-Camphor (VIVA EX) Apply topically nightly      digoxin (DIGOX) 250 MCG tablet Take 1 tablet by mouth daily 90 tablet 3    furosemide (LASIX) 40 MG tablet TAKE 1 TABLET BY MOUTH DAILY 90 tablet 3    carvedilol (COREG) 6.25 MG tablet Take 1 tablet by mouth 2 times daily 180 tablet 3    allopurinol (ZYLOPRIM) 100 MG tablet TAKE 1 TABLET BY MOUTH EVERY MORNING 90 tablet 3    loperamide (IMODIUM) 2 MG capsule Take 2 mg by mouth See Admin Instructions Indications: Diarrhea      Multiple Vitamins-Minerals (THERAPEUTIC MULTIVITAMIN-MINERALS) tablet Take 1 tablet by mouth 2 times daily Indications: Nutritional Support Noon and Supper      acetaminophen (TYLENOL) 325 MG tablet Take 650 mg by mouth every 6 hours as needed for Pain or Fever Indications: Pain Don't take more than 3,000 mg per day      Calcium Carbonate-Vitamin D (CALCIUM + D PO) Take 1 tablet by mouth 2 times daily Indications: Treatment to Prevent Vitamin Deficiency Noon and Supper      aspirin 81 MG EC tablet Take 81 mg by mouth nightly Indications: Anticoagulant Therapy With food. No current facility-administered medications for this visit.         No Known Allergies    Review of Systems - General ROS: negative  Psychological ROS: EVERY NIGHT 90 tablet 5    atorvastatin (LIPITOR) 20 MG tablet Take 1 tablet by mouth nightly 90 tablet 3    Lidocaine-Methyl Sal-Camphor (VIVA EX) Apply topically nightly      digoxin (DIGOX) 250 MCG tablet Take 1 tablet by mouth daily 90 tablet 3    furosemide (LASIX) 40 MG tablet TAKE 1 TABLET BY MOUTH DAILY 90 tablet 3    carvedilol (COREG) 6.25 MG tablet Take 1 tablet by mouth 2 times daily 180 tablet 3    allopurinol (ZYLOPRIM) 100 MG tablet TAKE 1 TABLET BY MOUTH EVERY MORNING 90 tablet 3    loperamide (IMODIUM) 2 MG capsule Take 2 mg by mouth See Admin Instructions Indications: Diarrhea      Multiple Vitamins-Minerals (THERAPEUTIC MULTIVITAMIN-MINERALS) tablet Take 1 tablet by mouth 2 times daily Indications: Nutritional Support Noon and Supper      acetaminophen (TYLENOL) 325 MG tablet Take 650 mg by mouth every 6 hours as needed for Pain or Fever Indications: Pain Don't take more than 3,000 mg per day      Calcium Carbonate-Vitamin D (CALCIUM + D PO) Take 1 tablet by mouth 2 times daily Indications: Treatment to Prevent Vitamin Deficiency Noon and Supper      aspirin 81 MG EC tablet Take 81 mg by mouth nightly Indications: Anticoagulant Therapy With food.  [DISCONTINUED] sacubitril-valsartan (ENTRESTO) 49-51 MG per tablet Take 1 tablet by mouth 2 times daily LOT  Exp  56 tablet 0    [DISCONTINUED] LORazepam (ATIVAN) 1 MG tablet Take 1 mg by mouth daily as needed for Anxiety. .       No facility-administered encounter medications on file as of 9/17/2018. DIAGNOSES   Diagnosis Orders   1. Chronic systolic congestive heart failure ([facility] [address]     2. Mild cognitive impairment  LORazepam (ATIVAN) 1 MG tablet   3. Kaposi's sarcoma ([facility] [address]     4. SSS (sick sinus syndrome) ([facility] [address] [facility]     5. S/P AVR (aortic valve replacement)     6. S/P MVR (mitral valve repair), Physio ring, 2008     7. S/P CABG (coronary artery bypass graft)     8. ASCVD (arteriosclerotic cardiovascular disease)     9. mg by mouth daily  I recommend continuing the medium risk statin as patient is > 76years of age. Patient has a [id number] score of 5  Stroke and thromboembolism event rate at 1 year is approximately 15 %  I think the benefits of anticoagulation ( thromboembolism risk reduction) outweigh the potential side effects (bleeding)  at this time.  We will continue chronic anticoagulation    I told the patient to contact me for any bleeding, falls, trauma, etc    I discussed the situation at length with patient and family and answered all  questions  I reassured him his ICD is not running out of batteries    F/u here 4 months

## 2018-10-02 ENCOUNTER — CARE COORDINATION (OUTPATIENT)
Dept: CARE COORDINATION | Age: 83
End: 2018-10-02

## 2018-10-05 ENCOUNTER — HOSPITAL ENCOUNTER (OUTPATIENT)
Dept: PHARMACY | Age: 83
Setting detail: THERAPIES SERIES
Discharge: HOME | End: 2018-10-05
Payer: MEDICARE

## 2018-10-05 DIAGNOSIS — I48.19 PERSISTENT ATRIAL FIBRILLATION (HCC): ICD-10-CM

## 2018-10-05 LAB — POC INR: 4.3 (ref 0.8–1.2)

## 2018-10-05 PROCEDURE — 85610 PROTHROMBIN TIME: CPT

## 2018-10-05 PROCEDURE — 36416 COLLJ CAPILLARY BLOOD SPEC: CPT

## 2018-10-05 PROCEDURE — 99211 OFF/OP EST MAY X REQ PHY/QHP: CPT

## 2018-10-05 NOTE — PROGRESS NOTES
previously   [] plans to receive at a later time   [] refused    [] documented in [facility]

## 2018-10-11 ENCOUNTER — HOSPITAL ENCOUNTER (OUTPATIENT)
Dept: PHARMACY | Age: 83
Setting detail: THERAPIES SERIES
Discharge: HOME | End: 2018-10-11
Payer: MEDICARE

## 2018-10-11 DIAGNOSIS — I48.19 PERSISTENT ATRIAL FIBRILLATION (HCC): ICD-10-CM

## 2018-10-11 LAB — POC INR: 1.7 (ref 0.8–1.2)

## 2018-10-11 PROCEDURE — 90686 IIV4 VACC NO PRSV 0.5 ML IM: CPT | Performed by: INTERNAL MEDICINE

## 2018-10-11 PROCEDURE — G0463 HOSPITAL OUTPT CLINIC VISIT: HCPCS | Performed by: PHARMACIST

## 2018-10-11 PROCEDURE — G0008 ADMIN INFLUENZA VIRUS VAC: HCPCS | Performed by: INTERNAL MEDICINE

## 2018-10-11 PROCEDURE — 85610 PROTHROMBIN TIME: CPT | Performed by: PHARMACIST

## 2018-10-11 PROCEDURE — 36416 COLLJ CAPILLARY BLOOD SPEC: CPT | Performed by: PHARMACIST

## 2018-10-11 PROCEDURE — 99211 OFF/OP EST MAY X REQ PHY/QHP: CPT | Performed by: PHARMACIST

## 2018-10-11 PROCEDURE — 6360000002 HC RX W HCPCS: Performed by: INTERNAL MEDICINE

## 2018-10-11 RX ADMIN — INFLUENZA A VIRUS A/MICHIGAN/45/2015 X-275 (H1N1) ANTIGEN (FORMALDEHYDE INACTIVATED), INFLUENZA A VIRUS A/SINGAPORE/INFIMH-16-0019/2016 IVR-186 (H3N2) ANTIGEN (FORMALDEHYDE INACTIVATED), INFLUENZA B VIRUS B/PHUKET/3073/2013 ANTIGEN (FORMALDEHYDE INACTIVATED), AND INFLUENZA B VIRUS B/MARYLAND/15/2016 BX-69A ANTIGEN (FORMALDEHYDE INACTIVATED) 0.5 ML: 15; 15; 15; 15 INJECTION, SUSPENSION INTRAMUSCULAR at 10:45

## 2018-10-25 PROBLEM — I50.22 CHRONIC SYSTOLIC CHF (CONGESTIVE HEART FAILURE) (HCC): Status: ACTIVE | Noted: 2018-10-25

## 2018-10-30 ENCOUNTER — NURSE ONLY (OUTPATIENT)
Dept: CARDIOLOGY CLINIC | Age: 83
End: 2018-10-30
Payer: MEDICARE

## 2018-10-30 DIAGNOSIS — Z95.810 AUTOMATIC IMPLANTABLE CARDIOVERTER-DEFIBRILLATOR IN SITU: Primary | ICD-10-CM

## 2018-10-30 PROCEDURE — 93282 PRGRMG EVAL IMPLANTABLE DFB: CPT | Performed by: INTERNAL MEDICINE

## 2018-10-30 NOTE — PROGRESS NOTES
DR MOBLEY PT  MEDTRONIC SINGLE ICD  BATTERY 2.84 VOLTS REMAINING  CHRISTIANO 2.63  V PACED 96%  AFIB/ COUMADIN  VENT IMPEDENCE 361  SHOCK 40  SVC 52  RV THRESHOLD 1.50 @ 0.40  RV AMPLITUDE PROGRAMMED AT 3 @ 0.40  HAD 3 NS VT EPISODES   VOICES NO ISSUES OR COMPLAINTS

## 2018-11-01 ENCOUNTER — HOSPITAL ENCOUNTER (OUTPATIENT)
Dept: PHARMACY | Age: 83
Setting detail: THERAPIES SERIES
Discharge: HOME | End: 2018-11-01
Payer: MEDICARE

## 2018-11-01 DIAGNOSIS — I48.19 PERSISTENT ATRIAL FIBRILLATION (HCC): ICD-10-CM

## 2018-11-01 LAB — POC INR: 2.3 (ref 0.8–1.2)

## 2018-11-01 PROCEDURE — 85610 PROTHROMBIN TIME: CPT

## 2018-11-01 PROCEDURE — 36416 COLLJ CAPILLARY BLOOD SPEC: CPT

## 2018-11-01 PROCEDURE — 99211 OFF/OP EST MAY X REQ PHY/QHP: CPT

## 2018-11-29 ENCOUNTER — HOSPITAL ENCOUNTER (OUTPATIENT)
Dept: PHARMACY | Age: 83
Setting detail: THERAPIES SERIES
Discharge: HOME | End: 2018-11-29
Payer: MEDICARE

## 2018-11-29 DIAGNOSIS — I48.19 PERSISTENT ATRIAL FIBRILLATION (HCC): ICD-10-CM

## 2018-11-29 LAB — POC INR: 2 (ref 0.8–1.2)

## 2018-11-29 PROCEDURE — 85610 PROTHROMBIN TIME: CPT

## 2018-11-29 PROCEDURE — 36416 COLLJ CAPILLARY BLOOD SPEC: CPT

## 2018-11-29 PROCEDURE — 99211 OFF/OP EST MAY X REQ PHY/QHP: CPT

## 2018-12-27 ENCOUNTER — HOSPITAL ENCOUNTER (OUTPATIENT)
Dept: PHARMACY | Age: 83
Setting detail: THERAPIES SERIES
Discharge: HOME | End: 2018-12-27
Payer: MEDICARE

## 2018-12-27 DIAGNOSIS — I48.19 PERSISTENT ATRIAL FIBRILLATION (HCC): ICD-10-CM

## 2018-12-27 LAB — POC INR: 2.4 (ref 0.8–1.2)

## 2018-12-27 PROCEDURE — 36416 COLLJ CAPILLARY BLOOD SPEC: CPT

## 2018-12-27 PROCEDURE — 85610 PROTHROMBIN TIME: CPT

## 2018-12-27 PROCEDURE — 99211 OFF/OP EST MAY X REQ PHY/QHP: CPT

## 2019-01-14 ENCOUNTER — NURSE ONLY (OUTPATIENT)
Dept: INTERNAL MEDICINE CLINIC | Age: 84
End: 2019-01-14
Payer: MEDICARE

## 2019-01-14 ENCOUNTER — OFFICE VISIT (OUTPATIENT)
Dept: INTERNAL MEDICINE CLINIC | Age: 84
End: 2019-01-14
Payer: MEDICARE

## 2019-01-14 VITALS
DIASTOLIC BLOOD PRESSURE: 60 MMHG | SYSTOLIC BLOOD PRESSURE: 120 MMHG | HEART RATE: 68 BPM | HEIGHT: 68 IN | WEIGHT: 178 LBS | BODY MASS INDEX: 26.98 KG/M2

## 2019-01-14 DIAGNOSIS — Z98.890 S/P MVR (MITRAL VALVE REPAIR): ICD-10-CM

## 2019-01-14 DIAGNOSIS — I47.20 VENTRICULAR TACHYCARDIA: ICD-10-CM

## 2019-01-14 DIAGNOSIS — I48.21 PERMANENT ATRIAL FIBRILLATION (HCC): ICD-10-CM

## 2019-01-14 DIAGNOSIS — Z95.2 S/P AVR (AORTIC VALVE REPLACEMENT): ICD-10-CM

## 2019-01-14 DIAGNOSIS — Z95.810 AUTOMATIC IMPLANTABLE CARDIOVERTER-DEFIBRILLATOR IN SITU: ICD-10-CM

## 2019-01-14 DIAGNOSIS — R07.9 CHEST PAIN, UNSPECIFIED TYPE: ICD-10-CM

## 2019-01-14 DIAGNOSIS — I35.0 AORTIC VALVE STENOSIS, ETIOLOGY OF CARDIAC VALVE DISEASE UNSPECIFIED: ICD-10-CM

## 2019-01-14 DIAGNOSIS — I50.22 CHRONIC SYSTOLIC CONGESTIVE HEART FAILURE (HCC): ICD-10-CM

## 2019-01-14 DIAGNOSIS — I10 ESSENTIAL HYPERTENSION: ICD-10-CM

## 2019-01-14 DIAGNOSIS — I50.22 CHRONIC SYSTOLIC CONGESTIVE HEART FAILURE (HCC): Primary | ICD-10-CM

## 2019-01-14 DIAGNOSIS — I25.10 ASCVD (ARTERIOSCLEROTIC CARDIOVASCULAR DISEASE): ICD-10-CM

## 2019-01-14 DIAGNOSIS — Z87.39 H/O: GOUT: ICD-10-CM

## 2019-01-14 DIAGNOSIS — Z95.1 S/P CABG (CORONARY ARTERY BYPASS GRAFT): ICD-10-CM

## 2019-01-14 LAB
ANION GAP SERPL CALCULATED.3IONS-SCNC: 9 MEQ/L (ref 8–16)
BUN BLDV-MCNC: 10 MG/DL (ref 7–22)
CALCIUM SERPL-MCNC: 9.4 MG/DL (ref 8.5–10.5)
CHLORIDE BLD-SCNC: 103 MEQ/L (ref 98–111)
CO2: 31 MEQ/L (ref 23–33)
CREAT SERPL-MCNC: 0.9 MG/DL (ref 0.4–1.2)
GFR SERPL CREATININE-BSD FRML MDRD: 80 ML/MIN/1.73M2
GLUCOSE BLD-MCNC: 83 MG/DL (ref 70–108)
HCT VFR BLD CALC: 38.5 % (ref 42–52)
HEMOGLOBIN: 12.6 GM/DL (ref 14–18)
POTASSIUM SERPL-SCNC: 4.1 MEQ/L (ref 3.5–5.2)
SODIUM BLD-SCNC: 143 MEQ/L (ref 135–145)

## 2019-01-14 PROCEDURE — 1101F PT FALLS ASSESS-DOCD LE1/YR: CPT | Performed by: INTERNAL MEDICINE

## 2019-01-14 PROCEDURE — 1123F ACP DISCUSS/DSCN MKR DOCD: CPT | Performed by: INTERNAL MEDICINE

## 2019-01-14 PROCEDURE — G8417 CALC BMI ABV UP PARAM F/U: HCPCS | Performed by: INTERNAL MEDICINE

## 2019-01-14 PROCEDURE — 93000 ELECTROCARDIOGRAM COMPLETE: CPT | Performed by: INTERNAL MEDICINE

## 2019-01-14 PROCEDURE — 99214 OFFICE O/P EST MOD 30 MIN: CPT | Performed by: INTERNAL MEDICINE

## 2019-01-14 PROCEDURE — 4040F PNEUMOC VAC/ADMIN/RCVD: CPT | Performed by: INTERNAL MEDICINE

## 2019-01-14 PROCEDURE — 36415 COLL VENOUS BLD VENIPUNCTURE: CPT | Performed by: INTERNAL MEDICINE

## 2019-01-14 PROCEDURE — 1036F TOBACCO NON-USER: CPT | Performed by: INTERNAL MEDICINE

## 2019-01-14 PROCEDURE — G8598 ASA/ANTIPLAT THER USED: HCPCS | Performed by: INTERNAL MEDICINE

## 2019-01-14 PROCEDURE — G8427 DOCREV CUR MEDS BY ELIG CLIN: HCPCS | Performed by: INTERNAL MEDICINE

## 2019-01-14 PROCEDURE — G8482 FLU IMMUNIZE ORDER/ADMIN: HCPCS | Performed by: INTERNAL MEDICINE

## 2019-01-14 RX ORDER — ALLOPURINOL 100 MG/1
TABLET ORAL
Qty: 90 TABLET | Refills: 3 | Status: SHIPPED | OUTPATIENT
Start: 2019-01-14 | End: 2020-01-08 | Stop reason: SDUPTHER

## 2019-01-14 RX ORDER — DIGOXIN 250 MCG
250 TABLET ORAL DAILY
Qty: 90 TABLET | Refills: 3 | Status: SHIPPED | OUTPATIENT
Start: 2019-01-14 | End: 2020-01-08 | Stop reason: SDUPTHER

## 2019-01-14 RX ORDER — CARVEDILOL 6.25 MG/1
6.25 TABLET ORAL 2 TIMES DAILY
Qty: 180 TABLET | Refills: 3 | Status: SHIPPED | OUTPATIENT
Start: 2019-01-14 | End: 2020-01-08 | Stop reason: SDUPTHER

## 2019-01-14 RX ORDER — FUROSEMIDE 40 MG/1
TABLET ORAL
Qty: 90 TABLET | Refills: 3 | Status: SHIPPED | OUTPATIENT
Start: 2019-01-14 | End: 2020-01-08 | Stop reason: SDUPTHER

## 2019-01-24 ENCOUNTER — HOSPITAL ENCOUNTER (OUTPATIENT)
Dept: PHARMACY | Age: 84
Setting detail: THERAPIES SERIES
Discharge: HOME | End: 2019-01-24
Payer: MEDICARE

## 2019-01-24 DIAGNOSIS — I48.19 PERSISTENT ATRIAL FIBRILLATION (HCC): ICD-10-CM

## 2019-01-24 LAB — POC INR: 2.2 (ref 0.8–1.2)

## 2019-01-24 PROCEDURE — 36416 COLLJ CAPILLARY BLOOD SPEC: CPT

## 2019-01-24 PROCEDURE — 99211 OFF/OP EST MAY X REQ PHY/QHP: CPT

## 2019-01-24 PROCEDURE — 85610 PROTHROMBIN TIME: CPT

## 2019-02-04 ENCOUNTER — NURSE ONLY (OUTPATIENT)
Dept: CARDIOLOGY CLINIC | Age: 84
End: 2019-02-04
Payer: MEDICARE

## 2019-02-04 DIAGNOSIS — Z95.810 AUTOMATIC IMPLANTABLE CARDIOVERTER-DEFIBRILLATOR IN SITU: Primary | ICD-10-CM

## 2019-02-04 PROCEDURE — 93282 PRGRMG EVAL IMPLANTABLE DFB: CPT | Performed by: INTERNAL MEDICINE

## 2019-02-21 ENCOUNTER — HOSPITAL ENCOUNTER (OUTPATIENT)
Dept: PHARMACY | Age: 84
Setting detail: THERAPIES SERIES
Discharge: HOME | End: 2019-02-21
Payer: MEDICARE

## 2019-02-21 DIAGNOSIS — I48.19 PERSISTENT ATRIAL FIBRILLATION (HCC): ICD-10-CM

## 2019-02-21 LAB — POC INR: 2.5 (ref 0.8–1.2)

## 2019-02-21 PROCEDURE — 85610 PROTHROMBIN TIME: CPT

## 2019-02-21 PROCEDURE — 99211 OFF/OP EST MAY X REQ PHY/QHP: CPT

## 2019-02-21 PROCEDURE — 36416 COLLJ CAPILLARY BLOOD SPEC: CPT

## 2019-03-12 ENCOUNTER — OFFICE VISIT (OUTPATIENT)
Dept: CARDIOLOGY CLINIC | Age: 84
End: 2019-03-12
Payer: MEDICARE

## 2019-03-12 VITALS
DIASTOLIC BLOOD PRESSURE: 66 MMHG | BODY MASS INDEX: 27.25 KG/M2 | WEIGHT: 184 LBS | SYSTOLIC BLOOD PRESSURE: 124 MMHG | HEIGHT: 69 IN

## 2019-03-12 DIAGNOSIS — I48.19 PERSISTENT ATRIAL FIBRILLATION (HCC): ICD-10-CM

## 2019-03-12 DIAGNOSIS — Z95.810 AUTOMATIC IMPLANTABLE CARDIOVERTER-DEFIBRILLATOR IN SITU: Primary | ICD-10-CM

## 2019-03-12 PROCEDURE — G8417 CALC BMI ABV UP PARAM F/U: HCPCS | Performed by: INTERNAL MEDICINE

## 2019-03-12 PROCEDURE — G8482 FLU IMMUNIZE ORDER/ADMIN: HCPCS | Performed by: INTERNAL MEDICINE

## 2019-03-12 PROCEDURE — G8427 DOCREV CUR MEDS BY ELIG CLIN: HCPCS | Performed by: INTERNAL MEDICINE

## 2019-03-12 PROCEDURE — 1036F TOBACCO NON-USER: CPT | Performed by: INTERNAL MEDICINE

## 2019-03-12 PROCEDURE — 93000 ELECTROCARDIOGRAM COMPLETE: CPT | Performed by: INTERNAL MEDICINE

## 2019-03-12 PROCEDURE — 99213 OFFICE O/P EST LOW 20 MIN: CPT | Performed by: INTERNAL MEDICINE

## 2019-03-12 PROCEDURE — 4040F PNEUMOC VAC/ADMIN/RCVD: CPT | Performed by: INTERNAL MEDICINE

## 2019-03-12 PROCEDURE — G8598 ASA/ANTIPLAT THER USED: HCPCS | Performed by: INTERNAL MEDICINE

## 2019-03-12 PROCEDURE — 1123F ACP DISCUSS/DSCN MKR DOCD: CPT | Performed by: INTERNAL MEDICINE

## 2019-03-12 PROCEDURE — 1101F PT FALLS ASSESS-DOCD LE1/YR: CPT | Performed by: INTERNAL MEDICINE

## 2019-03-12 ASSESSMENT — ENCOUNTER SYMPTOMS
CONSTIPATION: 0
COUGH: 0
WHEEZING: 0
SORE THROAT: 0
ABDOMINAL PAIN: 0
BACK PAIN: 0
DOUBLE VISION: 0
VOMITING: 0
BLURRED VISION: 0
RIGHT EYE: 0
DIARRHEA: 0
SHORTNESS OF BREATH: 0
NAUSEA: 0
LEFT EYE: 0

## 2019-03-28 ENCOUNTER — HOSPITAL ENCOUNTER (OUTPATIENT)
Dept: PHARMACY | Age: 84
Setting detail: THERAPIES SERIES
Discharge: HOME | End: 2019-03-28
Payer: MEDICARE

## 2019-03-28 DIAGNOSIS — I48.19 PERSISTENT ATRIAL FIBRILLATION (HCC): ICD-10-CM

## 2019-03-28 LAB — POC INR: 2.6 (ref 0.8–1.2)

## 2019-03-28 PROCEDURE — 99211 OFF/OP EST MAY X REQ PHY/QHP: CPT

## 2019-03-28 PROCEDURE — 85610 PROTHROMBIN TIME: CPT

## 2019-03-28 PROCEDURE — 36416 COLLJ CAPILLARY BLOOD SPEC: CPT

## 2019-05-02 ENCOUNTER — HOSPITAL ENCOUNTER (OUTPATIENT)
Dept: PHARMACY | Age: 84
Setting detail: THERAPIES SERIES
Discharge: HOME | End: 2019-05-02
Payer: MEDICARE

## 2019-05-02 DIAGNOSIS — I48.19 PERSISTENT ATRIAL FIBRILLATION (HCC): ICD-10-CM

## 2019-05-02 LAB — POC INR: 2.3 (ref 0.8–1.2)

## 2019-05-02 PROCEDURE — 99211 OFF/OP EST MAY X REQ PHY/QHP: CPT

## 2019-05-02 PROCEDURE — 85610 PROTHROMBIN TIME: CPT

## 2019-05-02 PROCEDURE — 36416 COLLJ CAPILLARY BLOOD SPEC: CPT

## 2019-05-02 NOTE — PROGRESS NOTES
Medication Management [facility]  Anticoagulation Clinic  [phone] (phone)  [phone] (fax)      Mr. Duncan Martínez is a 80 y.o.  male with history of persistent Afib who presents today for anticoagulation monitoring and adjustment. Patient verifies current tablet strength. Follows coumadin dosing calendar and usual pill box. No missed or extra doses. Patient denies s/s bleeding/bruising/swelling Usual SOB sometimes. Has fleeting chest pain. No blood in urine or stool. No dietary changes. No changes in medication/OTC agents/Herbals. No change in alcohol use or tobacco use. No change in activity level. Patient denies headaches/dizziness/lightheadedness/falls. No vomiting/diarrhea or acute illness. No procedures scheduled in the future at this time. Assessment:   Lab Results   Component Value Date    INR 2.30 (H) 05/02/2019    INR 2.60 (H) 03/28/2019    INR 2.50 (H) 02/21/2019     INR therapeutic   Recent Labs     05/02/19  1011   INR 2.30*     Plan: POCT INR ordered and result reviewed. Continue Coumadin 5 mg po MWF, 7.5 mg po TTHSS. Recheck INR in 6 weeks. Patient reminded to call the Anticoagulation Clinic with any signs or symptoms of bleeding or with any medication changes. Patient given instructions utilizing the teach back method. Discharged ambulatory in no apparent distress with son. After visit summary printed and reviewed with patient.       Medications reviewed and updated on home medication list Yes    Influenza vaccine:     [] given    [] declined   [x] received previously   [] plans to receive at a later time   [] refused    [x] documented in EPIC

## 2019-05-13 ENCOUNTER — NURSE ONLY (OUTPATIENT)
Dept: CARDIOLOGY CLINIC | Age: 84
End: 2019-05-13

## 2019-05-13 DIAGNOSIS — Z95.810 AUTOMATIC IMPLANTABLE CARDIOVERTER-DEFIBRILLATOR IN SITU: Primary | ICD-10-CM

## 2019-05-13 NOTE — PROGRESS NOTES
DR DE LA ROSA/ITZ PT  KNOWN AFIB/COUMADIN  [employer] SINGLE ICD CHECK IN OFFICE   BATTERY 2.70 VOLTS CHRISTIANO 2.63  PRESENTS IN VSVP  V PACED 95.7%  AFIB/COUMADIN  VVIR   VENT IMPEDENCE 361  RV 40  SVC 50   RV THRESHOLD 1 @ 0.8  RV THRESHOLD PROGRAMMED AT 2.5 @ 0.8  OPTIVOL WNL   LOOKS LIKE PT IS HAVING PVC'S    I CALLED AND SPOKE TO TECH SUPPORT AT [employer] TO SEE HOW MUCH TIME PT HAS REMAINING ON HIS BATTERY.  PT HAS 4.6 - 7 MTHS REMAINING  I WILL BRING PT BACK IN TWO MONTHS TO BE RECHECKED SINCE HE DOES NOT HAVE CARELINK  I WAS ALSO TOLD BY [employer] TECH SUPPORT THAT WHEN THIS PT DOES REACH RRT, HE WILL ONLY HAVE 2.5 MTHS REMAINING ON THE DEVICE BECAUSE HIS VENT AMPLITUDE IS PROGRAMMED HIGHER

## 2019-05-14 ENCOUNTER — OFFICE VISIT (OUTPATIENT)
Dept: INTERNAL MEDICINE CLINIC | Age: 84
End: 2019-05-14
Payer: MEDICARE

## 2019-05-14 VITALS
WEIGHT: 183 LBS | OXYGEN SATURATION: 96 % | SYSTOLIC BLOOD PRESSURE: 96 MMHG | HEART RATE: 68 BPM | BODY MASS INDEX: 27.11 KG/M2 | HEIGHT: 69 IN | DIASTOLIC BLOOD PRESSURE: 70 MMHG

## 2019-05-14 DIAGNOSIS — C46.9 KAPOSI'S SARCOMA (HCC): ICD-10-CM

## 2019-05-14 DIAGNOSIS — I48.21 PERMANENT ATRIAL FIBRILLATION (HCC): ICD-10-CM

## 2019-05-14 DIAGNOSIS — Z95.1 S/P CABG (CORONARY ARTERY BYPASS GRAFT): ICD-10-CM

## 2019-05-14 DIAGNOSIS — F41.9 ANXIETY: ICD-10-CM

## 2019-05-14 DIAGNOSIS — I49.5 SSS (SICK SINUS SYNDROME) (HCC): ICD-10-CM

## 2019-05-14 DIAGNOSIS — Z98.890 S/P MVR (MITRAL VALVE REPAIR): ICD-10-CM

## 2019-05-14 DIAGNOSIS — Z95.2 S/P AVR (AORTIC VALVE REPLACEMENT): ICD-10-CM

## 2019-05-14 DIAGNOSIS — I35.0 AORTIC VALVE STENOSIS, ETIOLOGY OF CARDIAC VALVE DISEASE UNSPECIFIED: ICD-10-CM

## 2019-05-14 DIAGNOSIS — Z87.39 H/O: GOUT: ICD-10-CM

## 2019-05-14 DIAGNOSIS — I50.22 CHRONIC SYSTOLIC CONGESTIVE HEART FAILURE (HCC): Primary | ICD-10-CM

## 2019-05-14 DIAGNOSIS — I25.10 ASCVD (ARTERIOSCLEROTIC CARDIOVASCULAR DISEASE): ICD-10-CM

## 2019-05-14 DIAGNOSIS — Z95.810 AUTOMATIC IMPLANTABLE CARDIOVERTER-DEFIBRILLATOR IN SITU: ICD-10-CM

## 2019-05-14 DIAGNOSIS — I47.20 VENTRICULAR TACHYCARDIA: ICD-10-CM

## 2019-05-14 PROCEDURE — G8427 DOCREV CUR MEDS BY ELIG CLIN: HCPCS | Performed by: INTERNAL MEDICINE

## 2019-05-14 PROCEDURE — 4040F PNEUMOC VAC/ADMIN/RCVD: CPT | Performed by: INTERNAL MEDICINE

## 2019-05-14 PROCEDURE — 1036F TOBACCO NON-USER: CPT | Performed by: INTERNAL MEDICINE

## 2019-05-14 PROCEDURE — G8598 ASA/ANTIPLAT THER USED: HCPCS | Performed by: INTERNAL MEDICINE

## 2019-05-14 PROCEDURE — 99214 OFFICE O/P EST MOD 30 MIN: CPT | Performed by: INTERNAL MEDICINE

## 2019-05-14 PROCEDURE — 1123F ACP DISCUSS/DSCN MKR DOCD: CPT | Performed by: INTERNAL MEDICINE

## 2019-05-14 PROCEDURE — G8417 CALC BMI ABV UP PARAM F/U: HCPCS | Performed by: INTERNAL MEDICINE

## 2019-05-14 RX ORDER — LORAZEPAM 1 MG/1
1 TABLET ORAL DAILY PRN
COMMUNITY
End: 2019-05-14 | Stop reason: SDUPTHER

## 2019-05-14 RX ORDER — LORAZEPAM 1 MG/1
1 TABLET ORAL DAILY PRN
Qty: 60 TABLET | Refills: 2 | Status: SHIPPED | OUTPATIENT
Start: 2019-05-14 | End: 2019-08-12

## 2019-05-14 NOTE — PROGRESS NOTES
Chief Complaint   Patient presents with    Congestive Heart Failure    Pharyngitis       Patient presents for medical evaluation. I last saw the patient 4 months   ago. Patient is followed on a regular basis by Dr. Loli Wiseman  Patient has not had any admissions to the hospital or change in medications since the last visit here. Pertinent past history reviewed and summarized below  He has an ICD with EF 25%   He has permanent atrial fibrillation, on warfarin. He had CABG, MR repair 2008    Patient says 2 years ago he felt a jolt in his chest.  He said his arm started shaking. He laid down, symptoms went away. 3 days later he went and got his defibrillator checked. He had an episode of anti-tachycardia pacing for ventricular tachycardia. He has had no problems since. He feels fine  I started coreg and had him stop driving  He is back to driving. Interrogation of his ICD shows 13 beats of ventricular tachycardia in June    He was admitted in October 2017 with chest pain. Dr. Loni Rubi did a cardiac catheterization which did show some CAD, not really amenable to intervention. Echo showed EF up to 50%. He is managed medically. He has done well  e is doing well. Denies weight gain. No orthopnea or PND. He says he no longer sees Dr. Loni Rubi  He is now seeing Dr. Shelly Campbell  He is here with his son  He complains of a sore throat, some chest congestion  He says occasionally he will get a sharp stabbing chest pain that comes and goes.   Not related to activity or exertion  Patient Active Problem List   Diagnosis    CAD (coronary artery disease)    Aortic stenosis    SSS (sick sinus syndrome) ([address]    Hyperlipidemia    Kaposi's sarcoma ([address]    S/P CABG (coronary artery bypass graft)    Medtronic single ICD    GERD (gastroesophageal reflux disease)    Hyperuricemia    HTN (hypertension)    S/P MVR (mitral valve repair), Physio ring, 2008    Persistent atrial fibrillation ([address]    Pacemaker    Dysphagia    Hoarseness    Laryngeal disorder    S/P AVR (aortic valve replacement)    Anticoagulated on Coumadin    Mild cognitive impairment    Chronic systolic CHF (congestive heart failure) (HCC)       Current Outpatient Medications   Medication Sig Dispense Refill    LORazepam (ATIVAN) 1 MG tablet Take 1 tablet by mouth daily as needed for Anxiety for up to 90 days. 60 tablet 2    digoxin (DIGOX) 250 MCG tablet Take 1 tablet by mouth daily 90 tablet 3    furosemide (LASIX) 40 MG tablet TAKE 1 TABLET BY MOUTH DAILY 90 tablet 3    carvedilol (COREG) 6.25 MG tablet Take 1 tablet by mouth 2 times daily 180 tablet 3    allopurinol (ZYLOPRIM) 100 MG tablet TAKE 1 TABLET BY MOUTH EVERY MORNING 90 tablet 3    Melatonin 2.5 MG CHEW Take 1 tablet by mouth nightly      sacubitril-valsartan (ENTRESTO) 49-51 MG per tablet Take 1 tablet by mouth 2 times daily 60 tablet 11    warfarin (COUMADIN) 5 MG tablet Take as directed by [facility]. 135 tablets = 90 days 135 tablet 3    donepezil (ARICEPT) 5 MG tablet TAKE 1 TABLET BY MOUTH EVERY NIGHT 90 tablet 5    atorvastatin (LIPITOR) 20 MG tablet Take 1 tablet by mouth nightly 90 tablet 3    Lidocaine-Methyl Sal-Camphor (VIVA EX) Apply topically nightly      loperamide (IMODIUM) 2 MG capsule Take 2 mg by mouth See Admin Instructions Indications: Diarrhea      Multiple Vitamins-Minerals (THERAPEUTIC MULTIVITAMIN-MINERALS) tablet Take 1 tablet by mouth 2 times daily Indications: Nutritional Support Noon and Supper      acetaminophen (TYLENOL) 325 MG tablet Take 650 mg by mouth every 6 hours as needed for Pain or Fever Indications: Pain Don't take more than 3,000 mg per day      Calcium Carbonate-Vitamin D (CALCIUM + D PO) Take 1 tablet by mouth 2 times daily Indications: Treatment to Prevent Vitamin Deficiency Noon and Supper      aspirin 81 MG EC tablet Take 81 mg by mouth nightly Indications: Anticoagulant Therapy With food.        No current facility-administered medications for this visit. No Known Allergies    Review of Systems - General ROS: negative  Psychological ROS: negative  Hematological and Lymphatic ROS: No history of blood clots or bleeding disorder. Respiratory ROS: + cough, no shortness of breath, or wheezing  Cardiovascular ROS: he does have dyspnea on exertion, usually about one block  Gastrointestinal ROS: no abdominal pain, change in bowel habits, or black or bloody stools  Genito-Urinary ROS: no dysuria, hesitancy, nocturia present  Musculoskeletal ROS:  negative  Neurological ROS: no TIA or stroke symptoms  Dermatological ROS: negative    Blood pressure 96/70, pulse 68, height 5' 9\" (1.753 m), weight 183 lb (83 kg), SpO2 96 %. Body mass index is 27.02 kg/m². Physical Examination: General appearance - alert, well appearing, and in no distress  Mental status - alert, oriented to person, place, and time  Neck - Neck is supple, no JVD or carotid bruits. No thyromegaly or adenopathy. Chest - clear to auscultation, no wheezes, rales or rhonchi, symmetric air entry  Heart - normal rate, regular rhythm, normal S1, S2, loud 3/6 systolic murmur over the entire precordium, no rubs, clicks or gallops  Abdomen - soft, nontender, nondistended, no masses or organomegaly  Neurological - alert, oriented, normal speech, no focal findings or movement disorder noted  Extremities - peripheral pulses normal, no pedal edema, no clubbing or cyanosis  Skin - normal coloration and turgor, no rashes, no suspicious skin lesions noted    No orders of the defined types were placed in this encounter. Outpatient Encounter Medications as of 5/14/2019   Medication Sig Dispense Refill    LORazepam (ATIVAN) 1 MG tablet Take 1 tablet by mouth daily as needed for Anxiety for up to 90 days.  60 tablet 2    digoxin (DIGOX) 250 MCG tablet Take 1 tablet by mouth daily 90 tablet 3    furosemide (LASIX) 40 MG tablet TAKE 1 TABLET BY MOUTH DAILY 90 tablet 3    carvedilol (COREG) 6.25 MG tablet Take 1 tablet by mouth 2 times daily 180 tablet 3    allopurinol (ZYLOPRIM) 100 MG tablet TAKE 1 TABLET BY MOUTH EVERY MORNING 90 tablet 3    Melatonin 2.5 MG CHEW Take 1 tablet by mouth nightly      sacubitril-valsartan (ENTRESTO) 49-51 MG per tablet Take 1 tablet by mouth 2 times daily 60 tablet 11    warfarin (COUMADIN) 5 MG tablet Take as directed by [facility]. 135 tablets = 90 days 135 tablet 3    donepezil (ARICEPT) 5 MG tablet TAKE 1 TABLET BY MOUTH EVERY NIGHT 90 tablet 5    atorvastatin (LIPITOR) 20 MG tablet Take 1 tablet by mouth nightly 90 tablet 3    Lidocaine-Methyl Sal-Camphor (VIVA EX) Apply topically nightly      loperamide (IMODIUM) 2 MG capsule Take 2 mg by mouth See Admin Instructions Indications: Diarrhea      Multiple Vitamins-Minerals (THERAPEUTIC MULTIVITAMIN-MINERALS) tablet Take 1 tablet by mouth 2 times daily Indications: Nutritional Support Noon and Supper      acetaminophen (TYLENOL) 325 MG tablet Take 650 mg by mouth every 6 hours as needed for Pain or Fever Indications: Pain Don't take more than 3,000 mg per day      Calcium Carbonate-Vitamin D (CALCIUM + D PO) Take 1 tablet by mouth 2 times daily Indications: Treatment to Prevent Vitamin Deficiency Noon and Supper      aspirin 81 MG EC tablet Take 81 mg by mouth nightly Indications: Anticoagulant Therapy With food.  [DISCONTINUED] LORazepam (ATIVAN) 1 MG tablet Take 1 mg by mouth daily as needed for Anxiety. No facility-administered encounter medications on file as of 5/14/2019. DIAGNOSES   Diagnosis Orders   1. Chronic systolic congestive heart failure ([address]     2. Anxiety  LORazepam (ATIVAN) 1 MG tablet   3. H/O: gout     4. Aortic valve stenosis, etiology of cardiac valve disease unspecified     5. Permanent atrial fibrillation ([address]     6. S/P CABG (coronary artery bypass graft)     7. Ventricular tachycardia ([address]     8. S/P AVR (aortic valve replacement)     9.  S/P MVR (mitral valve repair), Physio ring, 2008     10. Medtronic single ICD     11. ASCVD (arteriosclerotic cardiovascular disease)     12. Kaposi's sarcoma ([address]     13. SSS (sick sinus syndrome) ([facility])      EKG show        Patient has chronic congestive heart failure     Heart healthy , low salt diet essential    Daily weights with report of weight gain over 5 pounds    Take medications as prescribed    Avoid excess intake of fluids  Blood pressure always runs around 559 systolic  I reviewed patient's medications and possible interactions and side effects. I refilled those that were needed. Routine ICD checks      I reviewed patient's medications and possible interactions and side effects. I refilled those that were needed. Per the 2013 ACC/AHA guidelines on the treatment of blood cholesterol to reduce atherosclerotic cardiovascular risk in adults there are four major statin benefit groups.     Individuals   1) with clinical ASCVD    2) with primary elevations of LDL-C> 190 mg/dl    3) without clinical ASCVD or diabetes with LDL-C >70 to 189 mg/dl and estimated 10 year ASCVD risk> 7.5%    4) with diabetes aged 43-69 with LDL-C 70 to 189 mg/dl and without clinical ASCVD    Patient falls in to group 1      The recommended treatment for each group is as follows:  1) high-intensity statin, if age > 76 or not a candidate for high-intensity statin moderate intensity statin    2) high- intensity statin( moderate if not candidate for high)    3)moderate-to-high intensity statin    4) moderate -intensity statin, however if 10 year ASCVD risk > 7.5%, high-intensity statin    High-risk statin lowers LDL-C > 50%( atorvastatin 40mg, 80 mg and rosuvastatin 20 mg, 40 mg)  Moderate risk statin lowers LDL-C 30% to 50%( simvastatin 20-40mg, atorvastatin 10, 20 mg, rosuvastatin 5, 10 mg, pravastatin 40, 80 mg, lovastatin 40 mg, fluvastatinXL 80 mg, fluvastatin 40 mg bid, pitavastatin 2-4 mg)  Patient is currently on atorvastatin 20 mg by mouth daily  I recommend continuing the medium risk statin as patient is > 76years of age. Patient has a [id number] score of 5  Stroke and thromboembolism event rate at 1 year is approximately 15 %  I think the benefits of anticoagulation ( thromboembolism risk reduction) outweigh the potential side effects (bleeding)  at this time.  We will continue chronic anticoagulation    I told the patient to contact me for any bleeding, falls, trauma, etc    I discussed the situation at length with patient and family and answered all  questions      F/u here 4 months

## 2019-06-03 DIAGNOSIS — G31.84 MILD COGNITIVE IMPAIRMENT: ICD-10-CM

## 2019-06-04 RX ORDER — DONEPEZIL HYDROCHLORIDE 5 MG/1
TABLET, FILM COATED ORAL
Qty: 90 TABLET | Refills: 5 | Status: SHIPPED | OUTPATIENT
Start: 2019-06-04 | End: 2020-01-08 | Stop reason: SDUPTHER

## 2019-06-06 DIAGNOSIS — E78.5 HYPERLIPIDEMIA, UNSPECIFIED HYPERLIPIDEMIA TYPE: ICD-10-CM

## 2019-06-07 RX ORDER — ATORVASTATIN CALCIUM 20 MG/1
TABLET, FILM COATED ORAL
Qty: 90 TABLET | Refills: 2 | Status: SHIPPED | OUTPATIENT
Start: 2019-06-07 | End: 2020-01-07 | Stop reason: SDUPTHER

## 2019-06-13 ENCOUNTER — HOSPITAL ENCOUNTER (OUTPATIENT)
Dept: PHARMACY | Age: 84
Setting detail: THERAPIES SERIES
Discharge: HOME | End: 2019-06-13
Payer: MEDICARE

## 2019-06-13 DIAGNOSIS — I48.19 PERSISTENT ATRIAL FIBRILLATION (HCC): ICD-10-CM

## 2019-06-13 LAB — POC INR: 2.5 (ref 0.8–1.2)

## 2019-06-13 PROCEDURE — 99211 OFF/OP EST MAY X REQ PHY/QHP: CPT

## 2019-06-13 PROCEDURE — 85610 PROTHROMBIN TIME: CPT

## 2019-06-13 PROCEDURE — 36416 COLLJ CAPILLARY BLOOD SPEC: CPT

## 2019-06-13 NOTE — PROGRESS NOTES
Medication Management [facility]  Anticoagulation Clinic  [phone] (phone)  [phone] (fax)      Mr. Angie Longoria is a 80 y.o.  male with history of persisitent Afib who presents today for anticoagulation monitoring and adjustment. Female with patient verifies current dosing regimen and tablet strength. No missed or extra doses. Wife fills pill box. Patient denies s/s bleeding/bruising/swelling/chest pain. Gets SOB sometimes. Has usual fleeting chest discomfort. No blood in urine or stool. No dietary changes. No changes in medication/OTC agents/Herbals. No change in alcohol use or tobacco use. No change in activity level. Patient denies headaches/dizziness/falls. Gets lightheaded sometimes, no new. No vomiting/diarrhea or acute illness. No procedures scheduled in the future at this time. Assessment:   Lab Results   Component Value Date    INR 2.50 (H) 06/13/2019    INR 2.30 (H) 05/02/2019    INR 2.60 (H) 03/28/2019     INR therapeutic   Recent Labs     06/13/19  1021   INR 2.50*     Plan: POCT INR ordered and result reviewed. Continue Coumadin 5 mg po MWF, 7.5 mg po TTHSS. Recheck INR in 6 weeks. Patient reminded to call the Anticoagulation Clinic with any signs or symptoms of bleeding or with any medication changes. Patient given instructions utilizing the teach back method. Discharged ambulatory in no apparent distress with S.O. After visit summary printed and reviewed with patient.       Medications reviewed and updated on home medication list Yes    Influenza vaccine:     [] given    [] declined   [x] received previously   [] plans to receive at a later time   [] refused    [x] documented in EPIC

## 2019-07-11 ENCOUNTER — NURSE ONLY (OUTPATIENT)
Dept: CARDIOLOGY CLINIC | Age: 84
End: 2019-07-11
Payer: MEDICARE

## 2019-07-11 ENCOUNTER — TELEPHONE (OUTPATIENT)
Dept: CARDIOLOGY CLINIC | Age: 84
End: 2019-07-11

## 2019-07-11 DIAGNOSIS — Z95.810 AUTOMATIC IMPLANTABLE CARDIOVERTER-DEFIBRILLATOR IN SITU: Primary | ICD-10-CM

## 2019-07-11 PROCEDURE — 93288 INTERROG EVL PM/LDLS PM IP: CPT | Performed by: INTERNAL MEDICINE

## 2019-07-11 NOTE — PROGRESS NOTES
Medtronic single icd   Battery 2.66V  ( CHRISTIANO 2.63V)  V paced 95.5%  rv sensing >=80% paced   rv threshold 1.00V @ 0.8ms  rv impedance 361 ohms   Shock impedance 35ohms   svc impedance 43 ohms

## 2019-07-24 DIAGNOSIS — Z79.01 ANTICOAGULATED ON COUMADIN: Primary | ICD-10-CM

## 2019-07-24 DIAGNOSIS — I48.19 PERSISTENT ATRIAL FIBRILLATION (HCC): ICD-10-CM

## 2019-07-25 ENCOUNTER — HOSPITAL ENCOUNTER (OUTPATIENT)
Age: 84
Discharge: HOME | End: 2019-07-25
Payer: MEDICARE

## 2019-07-25 ENCOUNTER — HOSPITAL ENCOUNTER (OUTPATIENT)
Dept: PHARMACY | Age: 84
Setting detail: THERAPIES SERIES
Discharge: HOME | End: 2019-07-25
Payer: MEDICARE

## 2019-07-25 DIAGNOSIS — Z79.01 ANTICOAGULATED ON COUMADIN: ICD-10-CM

## 2019-07-25 DIAGNOSIS — I48.19 PERSISTENT ATRIAL FIBRILLATION (HCC): ICD-10-CM

## 2019-07-25 LAB
HCT VFR BLD CALC: 37.7 % (ref 42–52)
HEMOGLOBIN: 12 GM/DL (ref 14–18)
POC INR: 2.1 (ref 0.8–1.2)

## 2019-07-25 PROCEDURE — 85014 HEMATOCRIT: CPT

## 2019-07-25 PROCEDURE — 36415 COLL VENOUS BLD VENIPUNCTURE: CPT

## 2019-07-25 PROCEDURE — 99211 OFF/OP EST MAY X REQ PHY/QHP: CPT

## 2019-07-25 PROCEDURE — 85018 HEMOGLOBIN: CPT

## 2019-07-25 PROCEDURE — 36416 COLLJ CAPILLARY BLOOD SPEC: CPT

## 2019-07-25 PROCEDURE — 85610 PROTHROMBIN TIME: CPT

## 2019-09-05 ENCOUNTER — HOSPITAL ENCOUNTER (OUTPATIENT)
Dept: PHARMACY | Age: 84
Setting detail: THERAPIES SERIES
Discharge: HOME | End: 2019-09-05
Payer: MEDICARE

## 2019-09-05 DIAGNOSIS — I48.19 PERSISTENT ATRIAL FIBRILLATION (HCC): ICD-10-CM

## 2019-09-05 LAB — POC INR: 2.1 (ref 0.8–1.2)

## 2019-09-05 PROCEDURE — 99211 OFF/OP EST MAY X REQ PHY/QHP: CPT

## 2019-09-05 PROCEDURE — 85610 PROTHROMBIN TIME: CPT

## 2019-09-05 PROCEDURE — 36416 COLLJ CAPILLARY BLOOD SPEC: CPT

## 2019-09-16 ENCOUNTER — OFFICE VISIT (OUTPATIENT)
Dept: INTERNAL MEDICINE CLINIC | Age: 84
End: 2019-09-16
Payer: MEDICARE

## 2019-09-16 VITALS
HEART RATE: 80 BPM | DIASTOLIC BLOOD PRESSURE: 66 MMHG | WEIGHT: 175 LBS | SYSTOLIC BLOOD PRESSURE: 106 MMHG | RESPIRATION RATE: 24 BRPM | HEIGHT: 69 IN | BODY MASS INDEX: 25.92 KG/M2

## 2019-09-16 DIAGNOSIS — Z87.39 H/O: GOUT: ICD-10-CM

## 2019-09-16 DIAGNOSIS — J02.9 SORE THROAT: ICD-10-CM

## 2019-09-16 DIAGNOSIS — G31.84 MILD COGNITIVE IMPAIRMENT: ICD-10-CM

## 2019-09-16 DIAGNOSIS — I50.22 CHRONIC SYSTOLIC CONGESTIVE HEART FAILURE (HCC): ICD-10-CM

## 2019-09-16 DIAGNOSIS — I10 ESSENTIAL HYPERTENSION: ICD-10-CM

## 2019-09-16 DIAGNOSIS — I49.5 SSS (SICK SINUS SYNDROME) (HCC): ICD-10-CM

## 2019-09-16 DIAGNOSIS — I25.10 ASCVD (ARTERIOSCLEROTIC CARDIOVASCULAR DISEASE): ICD-10-CM

## 2019-09-16 DIAGNOSIS — Z95.2 S/P AVR (AORTIC VALVE REPLACEMENT): ICD-10-CM

## 2019-09-16 DIAGNOSIS — Z98.890 S/P MVR (MITRAL VALVE REPAIR): ICD-10-CM

## 2019-09-16 DIAGNOSIS — C46.9 KAPOSI'S SARCOMA (HCC): ICD-10-CM

## 2019-09-16 DIAGNOSIS — I35.0 AORTIC VALVE STENOSIS, ETIOLOGY OF CARDIAC VALVE DISEASE UNSPECIFIED: ICD-10-CM

## 2019-09-16 DIAGNOSIS — Z95.810 AUTOMATIC IMPLANTABLE CARDIOVERTER-DEFIBRILLATOR IN SITU: ICD-10-CM

## 2019-09-16 DIAGNOSIS — I48.21 PERMANENT ATRIAL FIBRILLATION (HCC): Primary | ICD-10-CM

## 2019-09-16 DIAGNOSIS — Z95.1 S/P CABG (CORONARY ARTERY BYPASS GRAFT): ICD-10-CM

## 2019-09-16 DIAGNOSIS — E78.5 HYPERLIPIDEMIA, UNSPECIFIED HYPERLIPIDEMIA TYPE: ICD-10-CM

## 2019-09-16 DIAGNOSIS — I47.20 VENTRICULAR TACHYCARDIA: ICD-10-CM

## 2019-09-16 PROCEDURE — G8417 CALC BMI ABV UP PARAM F/U: HCPCS | Performed by: INTERNAL MEDICINE

## 2019-09-16 PROCEDURE — 4040F PNEUMOC VAC/ADMIN/RCVD: CPT | Performed by: INTERNAL MEDICINE

## 2019-09-16 PROCEDURE — G8598 ASA/ANTIPLAT THER USED: HCPCS | Performed by: INTERNAL MEDICINE

## 2019-09-16 PROCEDURE — 1123F ACP DISCUSS/DSCN MKR DOCD: CPT | Performed by: INTERNAL MEDICINE

## 2019-09-16 PROCEDURE — G8427 DOCREV CUR MEDS BY ELIG CLIN: HCPCS | Performed by: INTERNAL MEDICINE

## 2019-09-16 PROCEDURE — 1036F TOBACCO NON-USER: CPT | Performed by: INTERNAL MEDICINE

## 2019-09-16 PROCEDURE — 93000 ELECTROCARDIOGRAM COMPLETE: CPT | Performed by: INTERNAL MEDICINE

## 2019-09-16 PROCEDURE — 99213 OFFICE O/P EST LOW 20 MIN: CPT | Performed by: INTERNAL MEDICINE

## 2019-09-16 RX ORDER — LORAZEPAM 1 MG/1
1 TABLET ORAL DAILY
COMMUNITY
End: 2019-09-16 | Stop reason: SDUPTHER

## 2019-09-16 RX ORDER — LORAZEPAM 1 MG/1
1 TABLET ORAL EVERY EVENING
Qty: 30 TABLET | Refills: 1 | Status: SHIPPED | OUTPATIENT
Start: 2019-09-16 | End: 2019-11-14 | Stop reason: SDUPTHER

## 2019-09-16 ASSESSMENT — PATIENT HEALTH QUESTIONNAIRE - PHQ9
SUM OF ALL RESPONSES TO PHQ9 QUESTIONS 1 & 2: 0
SUM OF ALL RESPONSES TO PHQ QUESTIONS 1-9: 0
1. LITTLE INTEREST OR PLEASURE IN DOING THINGS: 0
SUM OF ALL RESPONSES TO PHQ QUESTIONS 1-9: 0
2. FEELING DOWN, DEPRESSED OR HOPELESS: 0

## 2019-09-16 NOTE — PROGRESS NOTES
[facility] PROFESSIONAL SERVS  PHYSICIANS [place] [address] [address]  [facility], [address]  Dept: [phone]  Dept Fax: [phone]        Chief Complaint   Patient presents with    Congestive Heart Failure    Hyperlipidemia    Hypertension    Atrial Fibrillation    Coronary Artery Disease        HPI:  Ibeth Jarvis is an 80 y.o. male presenting with his son for evaluation of atherosclerotic cardiovascular disease and hypertension. The patient complains of dizziness with lying down and standing up, as well as several months of persistent sore throat. He also has constant shortness of breath. He has an appointment tomorrow with his cardiologist.  The patient denies changes in appetite, vision changes, nausea, vomiting, and abdominal pain. The patient has several dark spots on his face and arms which he believes may be the return of his Kaposi sarcoma, which had been treated with chemotherapy in the past.  Past medical history includes atherosclerotic cardiovascular disease status post CABG, aortic stenosis with aortic valve replacement, sick sinus syndrome status post pacemaker placement, systolic heart failure with EF of 25% and ICD placement, permanent atrial fibrillation, hypertension, hyperlipidemia, GERD, gout, and Kaposi sarcoma.       Patient Active Problem List   Diagnosis    CAD (coronary artery disease)    Aortic stenosis    SSS (sick sinus syndrome) ([address]    Hyperlipidemia    Kaposi's sarcoma ([address]    S/P CABG (coronary artery bypass graft)    Medtronic single ICD    GERD (gastroesophageal reflux disease)    Hyperuricemia    HTN (hypertension)    S/P MVR (mitral valve repair), Physio ring, 2008    Persistent atrial fibrillation (HCC)    Pacemaker    Dysphagia    Hoarseness    Laryngeal disorder    S/P AVR (aortic valve replacement)    Anticoagulated on Coumadin    Mild cognitive impairment    Chronic systolic CHF (congestive heart failure) ([address]       Current pain.  Gastrointestinal: Negative abdominal pain, nausea, vomiting, diarrhea, constipation, and hematochezia. Genitourinary: Negative dysuria, difficulty voiding, or hematuria. Musculoskeletal: Negative for myalgias and arthralgias. Neurological: Negative for TIA, stroke symptoms, numbness, or tingling. Dermatologica: Negative for rash or dryness. Vitals:  Vitals:    09/16/19 1112   BP: 106/66   Pulse: 80   Resp: 24        Physical Examination:   General appearance - alert, well appearing, and in no distress  Mental status - alert, oriented to person, place, and time  Neck - supple, no significant adenopathy  Chest - clear to auscultation, no wheezes, rales or rhonchi, symmetric air entry  Heart - normal rate, regular rhythm, normal S1, S2, no murmurs, rubs, clicks or gallops  Abdomen - soft, nontender, nondistended, no masses or organomegaly  Neurological - alert, oriented, normal speech, no focal findings or movement disorder noted  Musculoskeletal - no joint tenderness, deformity or swelling  Extremities - peripheral pulses normal, no pedal edema, no clubbing or cyanosis  Skin - No rashes. 2-cm hyperpigmented patch on right cheek. Results:  EKG shows electronic ventricular pacemaker      Assessment:   Diagnosis Orders   1. Permanent atrial fibrillation (HCC)  EKG 12 Lead    LORazepam (ATIVAN) 1 MG tablet    Yara Finley MD, Otolaryngology, [facility]   2. Sore throat     3. Hyperlipidemia, unspecified hyperlipidemia type     4. Mild cognitive impairment     5. Chronic systolic congestive heart failure ([address]     6. H/O: gout     7. Aortic valve stenosis, etiology of cardiac valve disease unspecified     8. S/P CABG (coronary artery bypass graft)     9. Ventricular tachycardia ([address]     10. S/P AVR (aortic valve replacement)     11. S/P MVR (mitral valve repair), Physio ring, 2008     12. Medtronic single ICD     13. ASCVD (arteriosclerotic cardiovascular disease)     14.  Kaposi's sarcoma ([address]

## 2019-09-17 ENCOUNTER — OFFICE VISIT (OUTPATIENT)
Dept: CARDIOLOGY CLINIC | Age: 84
End: 2019-09-17
Payer: MEDICARE

## 2019-09-17 VITALS
WEIGHT: 175.6 LBS | BODY MASS INDEX: 26.01 KG/M2 | DIASTOLIC BLOOD PRESSURE: 60 MMHG | HEIGHT: 69 IN | HEART RATE: 89 BPM | SYSTOLIC BLOOD PRESSURE: 97 MMHG

## 2019-09-17 DIAGNOSIS — I48.19 PERSISTENT ATRIAL FIBRILLATION (HCC): Primary | ICD-10-CM

## 2019-09-17 PROCEDURE — 4040F PNEUMOC VAC/ADMIN/RCVD: CPT | Performed by: INTERNAL MEDICINE

## 2019-09-17 PROCEDURE — G8598 ASA/ANTIPLAT THER USED: HCPCS | Performed by: INTERNAL MEDICINE

## 2019-09-17 PROCEDURE — 1123F ACP DISCUSS/DSCN MKR DOCD: CPT | Performed by: INTERNAL MEDICINE

## 2019-09-17 PROCEDURE — 1036F TOBACCO NON-USER: CPT | Performed by: INTERNAL MEDICINE

## 2019-09-17 PROCEDURE — 99213 OFFICE O/P EST LOW 20 MIN: CPT | Performed by: INTERNAL MEDICINE

## 2019-09-17 PROCEDURE — G8417 CALC BMI ABV UP PARAM F/U: HCPCS | Performed by: INTERNAL MEDICINE

## 2019-09-17 PROCEDURE — 93000 ELECTROCARDIOGRAM COMPLETE: CPT | Performed by: INTERNAL MEDICINE

## 2019-09-17 PROCEDURE — G8427 DOCREV CUR MEDS BY ELIG CLIN: HCPCS | Performed by: INTERNAL MEDICINE

## 2019-09-17 ASSESSMENT — ENCOUNTER SYMPTOMS
DOUBLE VISION: 0
DIARRHEA: 0
ABDOMINAL PAIN: 0
SORE THROAT: 0
LEFT EYE: 0
COUGH: 0
SHORTNESS OF BREATH: 0
VOMITING: 0
NAUSEA: 0
BLURRED VISION: 0
WHEEZING: 0
BACK PAIN: 0
RIGHT EYE: 0
CONSTIPATION: 0

## 2019-09-19 ENCOUNTER — TELEPHONE (OUTPATIENT)
Dept: CARDIOLOGY CLINIC | Age: 84
End: 2019-09-19

## 2019-09-19 ENCOUNTER — OFFICE VISIT (OUTPATIENT)
Dept: ENT CLINIC | Age: 84
End: 2019-09-19
Payer: MEDICARE

## 2019-09-19 ENCOUNTER — NURSE ONLY (OUTPATIENT)
Dept: CARDIOLOGY CLINIC | Age: 84
End: 2019-09-19

## 2019-09-19 VITALS
TEMPERATURE: 97.7 F | WEIGHT: 175.5 LBS | HEIGHT: 69 IN | RESPIRATION RATE: 12 BRPM | HEART RATE: 64 BPM | DIASTOLIC BLOOD PRESSURE: 68 MMHG | SYSTOLIC BLOOD PRESSURE: 110 MMHG | BODY MASS INDEX: 26 KG/M2

## 2019-09-19 DIAGNOSIS — R06.5 MOUTH BREATHING: ICD-10-CM

## 2019-09-19 DIAGNOSIS — R49.9 HOARSENESS OR CHANGING VOICE: ICD-10-CM

## 2019-09-19 DIAGNOSIS — Z87.898 HISTORY OF ALCOHOL USE: ICD-10-CM

## 2019-09-19 DIAGNOSIS — H61.23 BILATERAL IMPACTED CERUMEN: ICD-10-CM

## 2019-09-19 DIAGNOSIS — Z87.891 HISTORY OF CIGARETTE SMOKING: ICD-10-CM

## 2019-09-19 DIAGNOSIS — Z95.810 AUTOMATIC IMPLANTABLE CARDIOVERTER-DEFIBRILLATOR IN SITU: Primary | ICD-10-CM

## 2019-09-19 DIAGNOSIS — J02.9 PHARYNGITIS, UNSPECIFIED ETIOLOGY: Primary | ICD-10-CM

## 2019-09-19 PROCEDURE — 1123F ACP DISCUSS/DSCN MKR DOCD: CPT | Performed by: PHYSICIAN ASSISTANT

## 2019-09-19 PROCEDURE — G8427 DOCREV CUR MEDS BY ELIG CLIN: HCPCS | Performed by: PHYSICIAN ASSISTANT

## 2019-09-19 PROCEDURE — G8598 ASA/ANTIPLAT THER USED: HCPCS | Performed by: PHYSICIAN ASSISTANT

## 2019-09-19 PROCEDURE — 99204 OFFICE O/P NEW MOD 45 MIN: CPT | Performed by: PHYSICIAN ASSISTANT

## 2019-09-19 PROCEDURE — 1036F TOBACCO NON-USER: CPT | Performed by: PHYSICIAN ASSISTANT

## 2019-09-19 PROCEDURE — 4040F PNEUMOC VAC/ADMIN/RCVD: CPT | Performed by: PHYSICIAN ASSISTANT

## 2019-09-19 PROCEDURE — G8417 CALC BMI ABV UP PARAM F/U: HCPCS | Performed by: PHYSICIAN ASSISTANT

## 2019-09-19 RX ORDER — RANITIDINE 150 MG/1
150 TABLET ORAL NIGHTLY
Qty: 60 TABLET | Refills: 1 | Status: SHIPPED | OUTPATIENT
Start: 2019-09-19 | End: 2019-09-26

## 2019-09-19 ASSESSMENT — ENCOUNTER SYMPTOMS
TROUBLE SWALLOWING: 1
EYE PAIN: 0
SINUS PRESSURE: 0
APNEA: 0
COUGH: 0
EYE ITCHING: 0
VOICE CHANGE: 1
SHORTNESS OF BREATH: 0
EYE REDNESS: 0
SINUS PAIN: 0
RHINORRHEA: 0
SORE THROAT: 1

## 2019-09-19 NOTE — PATIENT INSTRUCTIONS
Use hydrogen peroxide in both ears. You will put about 4-5 drops in one ear while laying on your side with the ear straight up in the air. Let them soak in for about 10 minutes then switch the the other side. You should do this every day until your follow up visit with Dr. Holly Giron.

## 2019-09-19 NOTE — PROGRESS NOTES
Medtronic single icd Battery watch  Battery 2.65V ( CHRISTIANO 2.63)  V paced 95.1%  RV >=80% pacing  rv threshold 1.00V @ 0.8ms  Ventricle impedance 399 ohms   RV 38 ohms   SVC 47 ohms   9 episodes of VT-NS ( >4beats )   Most recent episode with 6beats - VT Vs SVT

## 2019-09-26 ENCOUNTER — OFFICE VISIT (OUTPATIENT)
Dept: ENT CLINIC | Age: 84
End: 2019-09-26
Payer: MEDICARE

## 2019-09-26 VITALS
WEIGHT: 174 LBS | HEIGHT: 69 IN | SYSTOLIC BLOOD PRESSURE: 100 MMHG | TEMPERATURE: 97.3 F | DIASTOLIC BLOOD PRESSURE: 64 MMHG | HEART RATE: 76 BPM | RESPIRATION RATE: 12 BRPM | BODY MASS INDEX: 25.77 KG/M2

## 2019-09-26 DIAGNOSIS — H60.312 ACUTE DIFFUSE OTITIS EXTERNA OF LEFT EAR: ICD-10-CM

## 2019-09-26 DIAGNOSIS — K21.9 GASTROESOPHAGEAL REFLUX DISEASE WITHOUT ESOPHAGITIS: ICD-10-CM

## 2019-09-26 DIAGNOSIS — H61.23 BILATERAL IMPACTED CERUMEN: ICD-10-CM

## 2019-09-26 DIAGNOSIS — T46.5X5D ADVERSE EFFECT OF ANGIOTENSIN 2 RECEPTOR ANTAGONIST, SUBSEQUENT ENCOUNTER: ICD-10-CM

## 2019-09-26 DIAGNOSIS — R49.0 HOARSENESS: Primary | ICD-10-CM

## 2019-09-26 DIAGNOSIS — J38.3 AGE-RELATED VOCAL CORD ATROPHY: ICD-10-CM

## 2019-09-26 PROCEDURE — G8598 ASA/ANTIPLAT THER USED: HCPCS | Performed by: OTOLARYNGOLOGY

## 2019-09-26 PROCEDURE — 1036F TOBACCO NON-USER: CPT | Performed by: OTOLARYNGOLOGY

## 2019-09-26 PROCEDURE — 99213 OFFICE O/P EST LOW 20 MIN: CPT | Performed by: OTOLARYNGOLOGY

## 2019-09-26 PROCEDURE — 1123F ACP DISCUSS/DSCN MKR DOCD: CPT | Performed by: OTOLARYNGOLOGY

## 2019-09-26 PROCEDURE — 31575 DIAGNOSTIC LARYNGOSCOPY: CPT | Performed by: OTOLARYNGOLOGY

## 2019-09-26 PROCEDURE — 4040F PNEUMOC VAC/ADMIN/RCVD: CPT | Performed by: OTOLARYNGOLOGY

## 2019-09-26 PROCEDURE — 4130F TOPICAL PREP RX AOE: CPT | Performed by: OTOLARYNGOLOGY

## 2019-09-26 PROCEDURE — G8417 CALC BMI ABV UP PARAM F/U: HCPCS | Performed by: OTOLARYNGOLOGY

## 2019-09-26 PROCEDURE — 69210 REMOVE IMPACTED EAR WAX UNI: CPT | Performed by: OTOLARYNGOLOGY

## 2019-09-26 PROCEDURE — G8427 DOCREV CUR MEDS BY ELIG CLIN: HCPCS | Performed by: OTOLARYNGOLOGY

## 2019-09-26 RX ORDER — RANITIDINE 150 MG/1
150 TABLET ORAL 2 TIMES DAILY
Qty: 60 TABLET | Refills: 1 | Status: SHIPPED | OUTPATIENT
Start: 2019-09-26 | End: 2020-05-14

## 2019-09-26 ASSESSMENT — ENCOUNTER SYMPTOMS
RHINORRHEA: 0
APNEA: 0
FACIAL SWELLING: 0
ABDOMINAL PAIN: 0
DIARRHEA: 0
COUGH: 0
CHEST TIGHTNESS: 0
WHEEZING: 0
SINUS PRESSURE: 0
VOMITING: 0
NAUSEA: 0
STRIDOR: 0
CHOKING: 0
SORE THROAT: 0
VOICE CHANGE: 0
TROUBLE SWALLOWING: 0
COLOR CHANGE: 0
SHORTNESS OF BREATH: 0

## 2019-09-26 NOTE — PATIENT INSTRUCTIONS
Can take MSM supplement 1000 mg daily to help with vocal cord mucosa. [employer] stores or online    Make own ear drops:  half and half of white vinegar and rubbing alcohol. Instill into left ear and keep the instilled ear up for 3 or 4 minutes. Then let the excess run out on a tissue. Allow the ear to air dry. Do not use Q-tips. Start Friday, do the left ear twice a day for a week and then daily. Right ear may be instilled as needed      Do not fill up stomach for 3 hours before bedtime. Elevate the head of the bed, perhaps with a foam wedge. May take Prilosec 20 mg with supper. Cough instead of clearing throat. Check with PCP regarding avoiding ACE/ARB BP meds.  For now we are trying Zantac to suppress the side effects on the throat, and take it twice a day and not just at night

## 2019-09-26 NOTE — PROGRESS NOTES
Constitutional: Negative for activity change, appetite change, chills, diaphoresis, fatigue, fever and unexpected weight change. HENT: Negative for congestion, dental problem, ear discharge, ear pain, facial swelling, hearing loss, mouth sores, nosebleeds, postnasal drip, rhinorrhea, sinus pressure, sneezing, sore throat, tinnitus, trouble swallowing and voice change. Eyes: Negative for visual disturbance. Respiratory: Negative for apnea, cough, choking, chest tightness, shortness of breath, wheezing and stridor. Cardiovascular: Negative for chest pain, palpitations and leg swelling. Gastrointestinal: Negative for abdominal pain, diarrhea, nausea and vomiting. Endocrine: Negative for cold intolerance, heat intolerance, polydipsia and polyuria. Genitourinary: Negative for dysuria, enuresis and hematuria. Musculoskeletal: Negative for arthralgias, gait problem, neck pain and neck stiffness. Skin: Negative for color change and rash. Allergic/Immunologic: Negative for environmental allergies, food allergies and immunocompromised state. Neurological: Negative for dizziness, syncope, facial asymmetry, speech difficulty, light-headedness and headaches. Hematological: Negative for adenopathy. Does not bruise/bleed easily. Psychiatric/Behavioral: Negative for confusion and sleep disturbance. The patient is not nervous/anxious. Objective:     /64 (Site: Left Upper Arm, Position: Sitting)   Pulse 76   Temp 97.3 °F (36.3 °C) (Oral)   Resp 12   Ht 5' 9\" (1.753 m)   Wt 174 lb (78.9 kg)   BMI 25.70 kg/m²     Physical Exam   Constitutional: He is oriented to person, place, and time. He appears well-developed and well-nourished. He is cooperative. HENT:   Head: Normocephalic and atraumatic. Head is without laceration. Right Ear: Hearing, external ear and ear canal normal. No drainage or swelling. Tympanic membrane is not perforated and not erythematous. No middle ear effusion.

## 2019-09-30 ENCOUNTER — TELEPHONE (OUTPATIENT)
Dept: PHARMACY | Age: 84
End: 2019-09-30

## 2019-09-30 DIAGNOSIS — I48.21 PERMANENT ATRIAL FIBRILLATION (HCC): ICD-10-CM

## 2019-09-30 DIAGNOSIS — I48.19 PERSISTENT ATRIAL FIBRILLATION (HCC): ICD-10-CM

## 2019-09-30 RX ORDER — WARFARIN SODIUM 5 MG/1
TABLET ORAL
Qty: 135 TABLET | Refills: 3 | Status: SHIPPED | OUTPATIENT
Start: 2019-09-30 | End: 2020-01-08 | Stop reason: SDUPTHER

## 2019-10-17 ENCOUNTER — HOSPITAL ENCOUNTER (OUTPATIENT)
Dept: PHARMACY | Age: 84
Setting detail: THERAPIES SERIES
Discharge: HOME | End: 2019-10-17
Payer: MEDICARE

## 2019-10-17 DIAGNOSIS — I48.19 PERSISTENT ATRIAL FIBRILLATION (HCC): ICD-10-CM

## 2019-10-17 LAB — POC INR: 2.5 (ref 0.8–1.2)

## 2019-10-17 PROCEDURE — 99211 OFF/OP EST MAY X REQ PHY/QHP: CPT

## 2019-10-17 PROCEDURE — G0008 ADMIN INFLUENZA VIRUS VAC: HCPCS

## 2019-10-17 PROCEDURE — 85610 PROTHROMBIN TIME: CPT

## 2019-10-17 PROCEDURE — 6360000002 HC RX W HCPCS

## 2019-10-17 PROCEDURE — 36416 COLLJ CAPILLARY BLOOD SPEC: CPT

## 2019-10-17 PROCEDURE — 90686 IIV4 VACC NO PRSV 0.5 ML IM: CPT

## 2019-10-17 RX ADMIN — INFLUENZA A VIRUS A/BRISBANE/02/2018 IVR-190 (H1N1) ANTIGEN (PROPIOLACTONE INACTIVATED), INFLUENZA A VIRUS A/KANSAS/14/2017 X-327 (H3N2) ANTIGEN (PROPIOLACTONE INACTIVATED), INFLUENZA B VIRUS B/MARYLAND/15/2016 ANTIGEN (PROPIOLACTONE INACTIVATED), INFLUENZA B VIRUS B/PHUKET/3073/2013 BVR-1B ANTIGEN (PROPIOLACTONE INACTIVATED) 0.5 ML: 15; 15; 15; 15 INJECTION, SUSPENSION INTRAMUSCULAR at 10:05

## 2019-11-07 ENCOUNTER — NURSE ONLY (OUTPATIENT)
Dept: CARDIOLOGY CLINIC | Age: 84
End: 2019-11-07
Payer: MEDICARE

## 2019-11-07 DIAGNOSIS — Z95.810 AUTOMATIC IMPLANTABLE CARDIOVERTER-DEFIBRILLATOR IN SITU: Primary | ICD-10-CM

## 2019-11-07 PROCEDURE — 93289 INTERROG DEVICE EVAL HEART: CPT | Performed by: INTERNAL MEDICINE

## 2019-11-12 ENCOUNTER — OFFICE VISIT (OUTPATIENT)
Dept: ENT CLINIC | Age: 84
End: 2019-11-12
Payer: MEDICARE

## 2019-11-12 VITALS
SYSTOLIC BLOOD PRESSURE: 92 MMHG | HEART RATE: 66 BPM | BODY MASS INDEX: 26.51 KG/M2 | DIASTOLIC BLOOD PRESSURE: 64 MMHG | WEIGHT: 179 LBS | HEIGHT: 69 IN | RESPIRATION RATE: 12 BRPM

## 2019-11-12 DIAGNOSIS — J38.3 AGE-RELATED VOCAL CORD ATROPHY: ICD-10-CM

## 2019-11-12 DIAGNOSIS — T46.5X5D ADVERSE EFFECT OF ANGIOTENSIN 2 RECEPTOR ANTAGONIST, SUBSEQUENT ENCOUNTER: ICD-10-CM

## 2019-11-12 DIAGNOSIS — K21.9 GASTROESOPHAGEAL REFLUX DISEASE WITHOUT ESOPHAGITIS: ICD-10-CM

## 2019-11-12 DIAGNOSIS — R49.0 HOARSENESS: Primary | ICD-10-CM

## 2019-11-12 PROCEDURE — 99213 OFFICE O/P EST LOW 20 MIN: CPT | Performed by: OTOLARYNGOLOGY

## 2019-11-12 PROCEDURE — G8417 CALC BMI ABV UP PARAM F/U: HCPCS | Performed by: OTOLARYNGOLOGY

## 2019-11-12 PROCEDURE — G8427 DOCREV CUR MEDS BY ELIG CLIN: HCPCS | Performed by: OTOLARYNGOLOGY

## 2019-11-12 PROCEDURE — G8482 FLU IMMUNIZE ORDER/ADMIN: HCPCS | Performed by: OTOLARYNGOLOGY

## 2019-11-12 PROCEDURE — 4040F PNEUMOC VAC/ADMIN/RCVD: CPT | Performed by: OTOLARYNGOLOGY

## 2019-11-12 PROCEDURE — 1123F ACP DISCUSS/DSCN MKR DOCD: CPT | Performed by: OTOLARYNGOLOGY

## 2019-11-12 PROCEDURE — 1036F TOBACCO NON-USER: CPT | Performed by: OTOLARYNGOLOGY

## 2019-11-12 PROCEDURE — G8598 ASA/ANTIPLAT THER USED: HCPCS | Performed by: OTOLARYNGOLOGY

## 2019-11-12 ASSESSMENT — ENCOUNTER SYMPTOMS
DIARRHEA: 0
RHINORRHEA: 0
VOMITING: 0
NAUSEA: 0
COLOR CHANGE: 0
ABDOMINAL PAIN: 0
STRIDOR: 0
TROUBLE SWALLOWING: 0
WHEEZING: 0
VOICE CHANGE: 0
APNEA: 0
SINUS PRESSURE: 0
FACIAL SWELLING: 0
CHOKING: 0
SORE THROAT: 0
COUGH: 0
SHORTNESS OF BREATH: 0
CHEST TIGHTNESS: 0

## 2019-11-14 DIAGNOSIS — I48.21 PERMANENT ATRIAL FIBRILLATION (HCC): ICD-10-CM

## 2019-11-15 RX ORDER — LORAZEPAM 1 MG/1
1 TABLET ORAL EVERY EVENING
Qty: 30 TABLET | Refills: 1 | Status: SHIPPED | OUTPATIENT
Start: 2019-11-15 | End: 2020-01-08 | Stop reason: SDUPTHER

## 2019-11-27 ENCOUNTER — HOSPITAL ENCOUNTER (OUTPATIENT)
Dept: PHARMACY | Age: 84
Setting detail: THERAPIES SERIES
Discharge: HOME | End: 2019-11-27
Payer: MEDICARE

## 2019-11-27 DIAGNOSIS — I48.19 PERSISTENT ATRIAL FIBRILLATION (HCC): ICD-10-CM

## 2019-11-27 LAB — POC INR: 2.5 (ref 0.8–1.2)

## 2019-11-27 PROCEDURE — 99211 OFF/OP EST MAY X REQ PHY/QHP: CPT

## 2019-11-27 PROCEDURE — 85610 PROTHROMBIN TIME: CPT

## 2019-11-27 PROCEDURE — 36416 COLLJ CAPILLARY BLOOD SPEC: CPT

## 2019-12-26 ENCOUNTER — PROCEDURE VISIT (OUTPATIENT)
Dept: CARDIOLOGY CLINIC | Age: 84
End: 2019-12-26

## 2019-12-26 DIAGNOSIS — Z95.810 AUTOMATIC IMPLANTABLE CARDIOVERTER-DEFIBRILLATOR IN SITU: Primary | ICD-10-CM

## 2020-01-07 ENCOUNTER — TELEPHONE (OUTPATIENT)
Dept: INTERNAL MEDICINE CLINIC | Age: 85
End: 2020-01-07

## 2020-01-07 ENCOUNTER — OFFICE VISIT (OUTPATIENT)
Dept: INTERNAL MEDICINE CLINIC | Age: 85
End: 2020-01-07
Payer: MEDICARE

## 2020-01-07 VITALS
WEIGHT: 179 LBS | RESPIRATION RATE: 15 BRPM | HEART RATE: 91 BPM | BODY MASS INDEX: 27.13 KG/M2 | DIASTOLIC BLOOD PRESSURE: 61 MMHG | HEIGHT: 68 IN | SYSTOLIC BLOOD PRESSURE: 101 MMHG

## 2020-01-07 PROCEDURE — 99213 OFFICE O/P EST LOW 20 MIN: CPT | Performed by: INTERNAL MEDICINE

## 2020-01-07 PROCEDURE — G8417 CALC BMI ABV UP PARAM F/U: HCPCS | Performed by: INTERNAL MEDICINE

## 2020-01-07 PROCEDURE — 1036F TOBACCO NON-USER: CPT | Performed by: INTERNAL MEDICINE

## 2020-01-07 PROCEDURE — 1123F ACP DISCUSS/DSCN MKR DOCD: CPT | Performed by: INTERNAL MEDICINE

## 2020-01-07 PROCEDURE — G8427 DOCREV CUR MEDS BY ELIG CLIN: HCPCS | Performed by: INTERNAL MEDICINE

## 2020-01-07 PROCEDURE — G8482 FLU IMMUNIZE ORDER/ADMIN: HCPCS | Performed by: INTERNAL MEDICINE

## 2020-01-07 PROCEDURE — 4040F PNEUMOC VAC/ADMIN/RCVD: CPT | Performed by: INTERNAL MEDICINE

## 2020-01-07 NOTE — PROGRESS NOTES
failure) ([facility])       Current Outpatient Medications   Medication Sig Dispense Refill    allopurinol (ZYLOPRIM) 100 MG tablet TAKE 1 TABLET BY MOUTH EVERY MORNING 90 tablet 3    atorvastatin (LIPITOR) 20 MG tablet TAKE 1 TABLET BY MOUTH ONE TIME A DAY AT NIGHT 90 tablet 2    carvedilol (COREG) 6.25 MG tablet Take 1 tablet by mouth 2 times daily 180 tablet 3    digoxin (DIGOX) 250 MCG tablet Take 1 tablet by mouth daily 90 tablet 3    donepezil (ARICEPT) 5 MG tablet TAKE 1 TABLET BY MOUTH EVERY NIGHT 90 tablet 5    furosemide (LASIX) 40 MG tablet TAKE 1 TABLET BY MOUTH DAILY 90 tablet 3    LORazepam (ATIVAN) 1 MG tablet Take 1 tablet by mouth every evening for 60 days. 30 tablet 1    sacubitril-valsartan (ENTRESTO) 49-51 MG per tablet Take 1 tablet by mouth 2 times daily 60 tablet 11    warfarin (COUMADIN) 5 MG tablet Take as directed by [facility]. 135 tablets = 90 days 135 tablet 3    Melatonin 2.5 MG CHEW Take 1 tablet by mouth nightly      Lidocaine-Methyl Sal-Camphor (VIVA EX) Apply topically nightly      loperamide (IMODIUM) 2 MG capsule Take 2 mg by mouth See Admin Instructions Indications: Diarrhea      Multiple Vitamins-Minerals (THERAPEUTIC MULTIVITAMIN-MINERALS) tablet Take 1 tablet by mouth 2 times daily Indications: Nutritional Support Noon and Supper      acetaminophen (TYLENOL) 325 MG tablet Take 650 mg by mouth every 6 hours as needed for Pain or Fever Indications: Pain Don't take more than 3,000 mg per day      Calcium Carbonate-Vitamin D (CALCIUM + D PO) Take 1 tablet by mouth 2 times daily Indications: Treatment to Prevent Vitamin Deficiency Noon and Supper      aspirin 81 MG EC tablet Take 81 mg by mouth nightly Indications: Anticoagulant Therapy With food.  ranitidine (ZANTAC) 150 MG tablet Take 1 tablet by mouth 2 times daily 60 tablet 1     No current facility-administered medications for this visit.         No Known Allergies    Review of Systems - General ROS: negative  Psychological ROS: negative  Hematological and Lymphatic ROS: No history of blood clots or bleeding disorder. Respiratory ROS:noo shortness of breath, or wheezing  Cardiovascular ROS: he does have dyspnea on exertion, usually about one block  Gastrointestinal ROS: no abdominal pain, change in bowel habits, or black or bloody stools  Genito-Urinary ROS: no dysuria, hesitancy, nocturia present  Musculoskeletal ROS:  negative  Neurological ROS: no TIA or stroke symptoms  Dermatological ROS: negative    Blood pressure 101/61, pulse 91, resp. rate 15, height 5' 8\" (1.727 m), weight 179 lb (81.2 kg). Body mass index is 27.22 kg/m². Physical Examination: General appearance - alert, well appearing, and in no distress  Mental status - alert, oriented to person, place, and time  Neck - Neck is supple, no JVD or carotid bruits. No thyromegaly or adenopathy. Chest - clear to auscultation, no wheezes, rales or rhonchi, symmetric air entry  Heart - normal rate, regular rhythm, normal S1, S2, loud 3/6 systolic murmur over the entire precordium, no rubs, clicks or gallops  Abdomen - soft, nontender, nondistended, no masses or organomegaly  Neurological - alert, oriented, normal speech, no focal findings or movement disorder noted  Extremities - peripheral pulses normal, no pedal edema, no clubbing or cyanosis  Skin - normal coloration and turgor, no rashes, no suspicious skin lesions noted    No orders of the defined types were placed in this encounter.       Outpatient Encounter Medications as of 1/7/2020   Medication Sig Dispense Refill    allopurinol (ZYLOPRIM) 100 MG tablet TAKE 1 TABLET BY MOUTH EVERY MORNING 90 tablet 3    atorvastatin (LIPITOR) 20 MG tablet TAKE 1 TABLET BY MOUTH ONE TIME A DAY AT NIGHT 90 tablet 2    carvedilol (COREG) 6.25 MG tablet Take 1 tablet by mouth 2 times daily 180 tablet 3    digoxin (DIGOX) 250 MCG tablet Take 1 tablet by mouth daily 90 tablet 3    donepezil (ARICEPT) 5 MG

## 2020-01-08 ENCOUNTER — HOSPITAL ENCOUNTER (OUTPATIENT)
Dept: PHARMACY | Age: 85
Setting detail: THERAPIES SERIES
Discharge: HOME | End: 2020-01-08
Payer: MEDICARE

## 2020-01-08 LAB — POC INR: 2.9 (ref 0.8–1.2)

## 2020-01-08 PROCEDURE — 36416 COLLJ CAPILLARY BLOOD SPEC: CPT

## 2020-01-08 PROCEDURE — 99211 OFF/OP EST MAY X REQ PHY/QHP: CPT

## 2020-01-08 PROCEDURE — 85610 PROTHROMBIN TIME: CPT

## 2020-01-08 RX ORDER — DIGOXIN 250 MCG
250 TABLET ORAL DAILY
Qty: 90 TABLET | Refills: 3 | Status: SHIPPED | OUTPATIENT
Start: 2020-01-08 | End: 2020-09-17 | Stop reason: SDUPTHER

## 2020-01-08 RX ORDER — DONEPEZIL HYDROCHLORIDE 5 MG/1
TABLET, FILM COATED ORAL
Qty: 90 TABLET | Refills: 5 | Status: SHIPPED | OUTPATIENT
Start: 2020-01-08 | End: 2020-09-17 | Stop reason: SDUPTHER

## 2020-01-08 RX ORDER — ALLOPURINOL 100 MG/1
TABLET ORAL
Qty: 90 TABLET | Refills: 3 | Status: SHIPPED | OUTPATIENT
Start: 2020-01-08 | End: 2020-09-17 | Stop reason: SDUPTHER

## 2020-01-08 RX ORDER — LORAZEPAM 1 MG/1
1 TABLET ORAL EVERY EVENING
Qty: 30 TABLET | Refills: 1 | Status: SHIPPED | OUTPATIENT
Start: 2020-01-08 | End: 2020-03-08

## 2020-01-08 RX ORDER — CARVEDILOL 6.25 MG/1
6.25 TABLET ORAL 2 TIMES DAILY
Qty: 180 TABLET | Refills: 3 | Status: SHIPPED | OUTPATIENT
Start: 2020-01-08 | End: 2020-09-17 | Stop reason: SDUPTHER

## 2020-01-08 RX ORDER — WARFARIN SODIUM 5 MG/1
TABLET ORAL
Qty: 135 TABLET | Refills: 3 | Status: SHIPPED | OUTPATIENT
Start: 2020-01-08 | End: 2020-09-17 | Stop reason: SDUPTHER

## 2020-01-08 RX ORDER — FUROSEMIDE 40 MG/1
TABLET ORAL
Qty: 90 TABLET | Refills: 3 | Status: SHIPPED | OUTPATIENT
Start: 2020-01-08 | End: 2020-09-17 | Stop reason: SDUPTHER

## 2020-01-08 RX ORDER — ATORVASTATIN CALCIUM 20 MG/1
TABLET, FILM COATED ORAL
Qty: 90 TABLET | Refills: 2 | Status: SHIPPED | OUTPATIENT
Start: 2020-01-08 | End: 2020-09-17 | Stop reason: SDUPTHER

## 2020-01-08 NOTE — PROGRESS NOTES
Medication Management [facility]  Anticoagulation [facility]  [phone] (phone)  [phone] (fax)      Mr. Efrain Ackerman is a 80 y.o.  male with history of persistent Afib who presents today for anticoagulation monitoring and adjustment. Patient verifies current dosing regimen and tablet strength. No missed or extra doses. Patient denies s/s bleeding/bruising/swelling/SOB/chest pain. No blood in urine or stool. No dietary changes. No changes in medication/OTC agents/Herbals. No change in alcohol use or tobacco use. No change in activity level. Patient denies headaches/dizziness/lightheadedness/falls. No vomiting/diarrhea or acute illness. No procedures scheduled in the future at this time. Assessment:   Lab Results   Component Value Date    INR 2.90 (H) 01/08/2020    INR 2.50 (H) 11/27/2019    INR 2.50 (H) 10/17/2019     INR therapeutic   Recent Labs     01/08/20  1009   INR 2.90*     Plan: POCT INR ordered and result reviewed. Continue Coumadin 5 mg po MWF, 7.5 mg po TTHSS. Recheck INR in 6 weeks. Patient reminded to call the Anticoagulation Clinic with any signs or symptoms of bleeding or with any medication changes. Patient given instructions utilizing the teach back method. Discharged ambulatory in no apparent distress. After visit summary printed and reviewed with patient.       Medications reviewed and updated on home medication list Yes    Influenza vaccine:     [] given    [] declined   [x] received previously   [] plans to receive at a later time   [] refused    [x] documented in EPIC

## 2020-02-06 ENCOUNTER — PROCEDURE VISIT (OUTPATIENT)
Dept: CARDIOLOGY CLINIC | Age: 85
End: 2020-02-06

## 2020-02-19 ENCOUNTER — HOSPITAL ENCOUNTER (OUTPATIENT)
Dept: PHARMACY | Age: 85
Setting detail: THERAPIES SERIES
Discharge: HOME | End: 2020-02-19
Payer: MEDICARE

## 2020-02-19 ENCOUNTER — HOSPITAL ENCOUNTER (OUTPATIENT)
Age: 85
Discharge: HOME | End: 2020-02-19
Payer: MEDICARE

## 2020-02-19 LAB
HCT VFR BLD CALC: 34.3 % (ref 42–52)
HEMOGLOBIN: 10.8 GM/DL (ref 14–18)
POC INR: 2.4 (ref 0.8–1.2)

## 2020-02-19 PROCEDURE — 85610 PROTHROMBIN TIME: CPT

## 2020-02-19 PROCEDURE — 85014 HEMATOCRIT: CPT

## 2020-02-19 PROCEDURE — 85018 HEMOGLOBIN: CPT

## 2020-02-19 PROCEDURE — 99211 OFF/OP EST MAY X REQ PHY/QHP: CPT

## 2020-02-19 PROCEDURE — 36415 COLL VENOUS BLD VENIPUNCTURE: CPT

## 2020-02-19 PROCEDURE — 36416 COLLJ CAPILLARY BLOOD SPEC: CPT

## 2020-02-19 NOTE — PROGRESS NOTES
Medication Management [address]  [phone] (phone)  [phone] (fax)      Mr. Franco Kirby is a 80 y.o.  male with history of persistent Afib who presents today for anticoagulation monitoring and adjustment. Patient verifies current dosing regimen and tablet strength. No missed or extra doses. Patient denies s/s bleeding/bruising/swelling/chest pain. Gets a little SANTOS. No blood in urine or stool. No dietary changes. No changes in medication/OTC agents/Herbals. No change in alcohol use or tobacco use. No change in activity level. Patient denies headaches/dizziness/lightheadedness/falls. No vomiting/diarrhea or acute illness. No procedures scheduled in the future at this time. Assessment:   Lab Results   Component Value Date    INR 2.40 (H) 02/19/2020    INR 2.90 (H) 01/08/2020    INR 2.50 (H) 11/27/2019     INR therapeutic   Recent Labs     02/19/20  1026   INR 2.40*     Plan: POCT INR ordered and result reviewed. Continue Coumadin 5 mg po MWF, 7.5 mg po TTHSS. Recheck INR in 6 weeks. Patient reminded to call the Anticoagulation Clinic with any signs or symptoms of bleeding or with any medication changes. Patient given instructions utilizing the teach back method. Discharged ambulatory in no apparent distress with S.O. Had 6 month H&H drawn today. Results forwarded to  and . After visit summary printed and reviewed with patient.       Medications reviewed and updated on home medication list Yes    Influenza vaccine:     [] given    [] declined   [x] received previously   [] plans to receive at a later time   [] refused    [x] documented in EPIC

## 2020-03-12 RX ORDER — LORAZEPAM 1 MG/1
1 TABLET ORAL NIGHTLY
Qty: 30 TABLET | Refills: 0 | Status: SHIPPED | OUTPATIENT
Start: 2020-03-12 | End: 2020-04-07

## 2020-03-19 ENCOUNTER — PROCEDURE VISIT (OUTPATIENT)
Dept: CARDIOLOGY CLINIC | Age: 85
End: 2020-03-19

## 2020-03-19 NOTE — PROGRESS NOTES
[facility] medtronic single ICD    Battery 2.60 daxa 2.63   RV imped 361  Shock 37  SVC 46  R waves 1.9  94.7% RVp  optivol very mildly elevated, will monitor

## 2020-03-25 RX ORDER — SACUBITRIL AND VALSARTAN 49; 51 MG/1; MG/1
1 TABLET, FILM COATED ORAL 2 TIMES DAILY
Qty: 60 TABLET | Refills: 0 | Status: ON HOLD | COMMUNITY
Start: 2020-03-25 | End: 2020-04-22 | Stop reason: SDUPTHER

## 2020-03-26 ENCOUNTER — TELEPHONE (OUTPATIENT)
Dept: PHARMACY | Age: 85
End: 2020-03-26

## 2020-04-08 RX ORDER — LORAZEPAM 1 MG/1
1 TABLET ORAL DAILY
Qty: 30 TABLET | Refills: 0 | Status: SHIPPED | OUTPATIENT
Start: 2020-04-08 | End: 2020-05-04

## 2020-04-09 ENCOUNTER — TELEPHONE (OUTPATIENT)
Dept: CARDIOLOGY CLINIC | Age: 85
End: 2020-04-09

## 2020-04-09 ENCOUNTER — PROCEDURE VISIT (OUTPATIENT)
Dept: CARDIOLOGY CLINIC | Age: 85
End: 2020-04-09

## 2020-04-09 NOTE — TELEPHONE ENCOUNTER
Poly Lucio was informed of the CHRISTIANO on the battery of device , and Dr BUSTILLOS [facility] office informed.
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
## 2020-04-21 ENCOUNTER — HOSPITAL ENCOUNTER (OUTPATIENT)
Dept: PHARMACY | Age: 85
Setting detail: THERAPIES SERIES
Discharge: HOME | End: 2020-04-21
Payer: MEDICARE

## 2020-04-21 ENCOUNTER — NURSE ONLY (OUTPATIENT)
Dept: LAB | Age: 85
End: 2020-04-21

## 2020-04-21 LAB — INR BLD: 2.15 (ref 0.85–1.13)

## 2020-04-21 PROCEDURE — 99211 OFF/OP EST MAY X REQ PHY/QHP: CPT

## 2020-04-21 NOTE — PROGRESS NOTES
AM    CLINICAL PHARMACY CONSULT: MED RECONCILIATION/REVIEW ADDENDUM    For Pharmacy Admin Tracking Only    PHSO: No  Total # of Interventions Recommended: 0  - Maintenance Safety Lab Monitoring #: 1  Total Interventions Accepted: 0  Time Spent (min): 15    Addendum: Marjorie Nelson from Dr. Kaiser Turner left message and stated she would reach out to the patient's daughter regarding warfarin.

## 2020-04-22 ENCOUNTER — APPOINTMENT (OUTPATIENT)
Dept: PHARMACY | Age: 85
End: 2020-04-22
Payer: MEDICARE

## 2020-04-22 ENCOUNTER — HOSPITAL ENCOUNTER (OUTPATIENT)
Dept: INPATIENT UNIT | Age: 85
Discharge: HOME | End: 2020-04-22
Attending: INTERNAL MEDICINE | Admitting: INTERNAL MEDICINE
Payer: MEDICARE

## 2020-04-22 VITALS
SYSTOLIC BLOOD PRESSURE: 137 MMHG | OXYGEN SATURATION: 94 % | WEIGHT: 190 LBS | TEMPERATURE: 98.4 F | RESPIRATION RATE: 13 BRPM | DIASTOLIC BLOOD PRESSURE: 65 MMHG | BODY MASS INDEX: 28.79 KG/M2 | HEART RATE: 73 BPM | HEIGHT: 68 IN

## 2020-04-22 LAB
EKG ATRIAL RATE: 71 BPM
EKG Q-T INTERVAL: 488 MS
EKG QRS DURATION: 202 MS
EKG QTC CALCULATION (BAZETT): 527 MS
EKG R AXIS: -77 DEGREES
EKG T AXIS: 94 DEGREES
EKG VENTRICULAR RATE: 70 BPM

## 2020-04-22 PROCEDURE — 2500000003 HC RX 250 WO HCPCS

## 2020-04-22 PROCEDURE — 93010 ELECTROCARDIOGRAM REPORT: CPT | Performed by: INTERNAL MEDICINE

## 2020-04-22 PROCEDURE — 33262 RMVL& REPLC PULSE GEN 1 LEAD: CPT

## 2020-04-22 PROCEDURE — 33262 RMVL& REPLC PULSE GEN 1 LEAD: CPT | Performed by: INTERNAL MEDICINE

## 2020-04-22 PROCEDURE — 2580000003 HC RX 258: Performed by: INTERNAL MEDICINE

## 2020-04-22 PROCEDURE — 93005 ELECTROCARDIOGRAM TRACING: CPT | Performed by: INTERNAL MEDICINE

## 2020-04-22 PROCEDURE — 6360000002 HC RX W HCPCS: Performed by: INTERNAL MEDICINE

## 2020-04-22 PROCEDURE — C1722 AICD, SINGLE CHAMBER: HCPCS

## 2020-04-22 PROCEDURE — 2709999900 HC NON-CHARGEABLE SUPPLY

## 2020-04-22 PROCEDURE — 6370000000 HC RX 637 (ALT 250 FOR IP): Performed by: INTERNAL MEDICINE

## 2020-04-22 PROCEDURE — 2500000003 HC RX 250 WO HCPCS: Performed by: INTERNAL MEDICINE

## 2020-04-22 RX ORDER — ONDANSETRON 2 MG/ML
4 INJECTION INTRAMUSCULAR; INTRAVENOUS EVERY 6 HOURS PRN
Status: DISCONTINUED | OUTPATIENT
Start: 2020-04-22 | End: 2020-04-22 | Stop reason: HOSPADM

## 2020-04-22 RX ORDER — HYDROCODONE BITARTRATE AND ACETAMINOPHEN 5; 325 MG/1; MG/1
2 TABLET ORAL EVERY 4 HOURS PRN
Status: DISCONTINUED | OUTPATIENT
Start: 2020-04-22 | End: 2020-04-22 | Stop reason: HOSPADM

## 2020-04-22 RX ORDER — ACETAMINOPHEN 325 MG/1
650 TABLET ORAL EVERY 4 HOURS PRN
Status: DISCONTINUED | OUTPATIENT
Start: 2020-04-22 | End: 2020-04-22 | Stop reason: HOSPADM

## 2020-04-22 RX ORDER — HYDROCODONE BITARTRATE AND ACETAMINOPHEN 5; 325 MG/1; MG/1
1 TABLET ORAL EVERY 4 HOURS PRN
Status: DISCONTINUED | OUTPATIENT
Start: 2020-04-22 | End: 2020-04-22 | Stop reason: HOSPADM

## 2020-04-22 RX ORDER — SODIUM CHLORIDE 9 MG/ML
INJECTION, SOLUTION INTRAVENOUS CONTINUOUS
Status: DISCONTINUED | OUTPATIENT
Start: 2020-04-22 | End: 2020-04-22 | Stop reason: HOSPADM

## 2020-04-22 RX ADMIN — ACETAMINOPHEN 650 MG: 325 TABLET ORAL at 12:28

## 2020-04-22 RX ADMIN — SODIUM CHLORIDE: 9 INJECTION, SOLUTION INTRAVENOUS at 09:59

## 2020-04-22 RX ADMIN — SODIUM CHLORIDE 50000 UNITS: 900 IRRIGANT IRRIGATION at 11:35

## 2020-04-22 RX ADMIN — CEFAZOLIN 2 G: 10 INJECTION, POWDER, FOR SOLUTION INTRAVENOUS at 11:36

## 2020-04-22 ASSESSMENT — PAIN SCALES - GENERAL: PAINLEVEL_OUTOF10: 8

## 2020-04-22 NOTE — PROGRESS NOTES
Pt admitted to  2E17 ambulatory for ICD gen change. Pt NPO. Patient accompanied by son. Vital signs obtained. Assessment and data collection initiated. Oriented to room. Policies and procedures for 2E explained   All questions answered with no further questions at this time. Fall prevention and safety precautions discussed with patient.

## 2020-04-22 NOTE — H&P
CARDIOLOGY - ELECTROPHYSIOLOGY  H&P     Date:                            4/22/2020  Patient name:  Juan Knox  Date of admission:       No admission date for patient encounter. 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 Nati Gonzalez is s/p 3V CABG as well as AVR and MV annuloplasty in 2008.  The patient has been doing very well from a cardiac standpoint. Nati Gonzalez has not had any recent heart failure exacerbations or episodes of angina. 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 Nati Gonzalez has a single chamber ICD and has had ATP for VT.  The patient's last episode was in June.  The patient has permanent atrial fibrillation secondary to valvular heart disease.  The patient has been seen by Dr. Adam Crowder in the past and the patient is unhappy with his care. Nati Gonzalez is going to continue to have Dr. Riaz Mathews manage his cardiac issues, and I am being asked to see the patient for his h/o atrial fibrillation and ventricular tachycardia.       03/06/2018: The patient was seen in the ED last month for bleeding from his leg secondary to severely dry skin and taking coumadin.  The patient's wife asked if he still needs to be taking the Janalyn Roes said it is very expensive. Nati Gonzalez denies having any heart failure symptoms.      09/11/2018:   The patient is doing well from a cardiac stand point. Nati Gonzalez reports having some chest pain after he wakes up in the morning and walks down the stairs to the kitchen. Nati Gonzalez states this has been unchanged for many years.       03/12/2019:  The patient is doing well from a cardiac standpoint. Simone Birch reports having an occasional episode of chest pain but he does not use any SL NTG.  The patient reports he was shocked by his ICD a couple of months ago. He was sleeping and when he awakened he thought he had been shocked.       09/17/2019:  The patient denies having any heart failure symptoms. He continues to have chest pains, but they are unchanged since his last visit. He has not needed any SL NTG to relive the pain. He continues to say he was shocked by his ICD.      04/22/2020:   The patient presents for ICD generator change since battery has reached CHRISTIANO.       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    Medtronic single ICD 9/22/2011    Persistent atrial fibrillation ([address] 2/11/2013    SSS (sick sinus syndrome) ([address]       Status post pacemaker         Past Surgical History:  Past Surgical History             Procedure Laterality Date    COLONOSCOPY   9/21/2010     Dr Porter Ortiz   2008     [facility]    EYE SURGERY        PACEMAKER INSERTION             Past Family History:   Family History             Problem Relation Age of Onset    Heart Disease Mother      Heart Disease Father           Past Social History:     Social History   Social History            Socioeconomic History    Marital status:        Spouse name: None    Number of children: None    Years of education: None    Highest education level: None   Occupational History    None   Social Needs    Financial resource strain: None    Food insecurity:       Worry: None       Inability: None    Transportation needs:       Medical: None       Non-medical: None   Tobacco Use    Smoking status: Former Smoker       Packs/day: 1.00       Years: 50.00       Pack years: 50.00       Types: Cigarettes, Cigars visit.  6. Permanent atrial fibrillation:  Secondary to mitral valve disease.  Ventricular rate is controlled and he is on warfarin.     Patient Active Problem List:     CAD (coronary artery disease)     Aortic stenosis     SSS (sick sinus syndrome) ([address]     Hyperlipidemia     Kaposi's sarcoma ([address]     S/P CABG (coronary artery bypass graft)     Medtronic single ICD     GERD (gastroesophageal reflux disease)     Hyperuricemia     HTN (hypertension)     S/P MVR (mitral valve repair), Physio ring, 2008     Persistent atrial fibrillation (HCC)     Pacemaker     Dysphagia     Hoarseness     Laryngeal disorder     S/P AVR (aortic valve replacement)     Anticoagulated on Coumadin     Mild cognitive impairment     Chronic systolic CHF (congestive heart failure) ([address]        Plan of Treatment:   1. Continue current medications. 2. ICD generator change. ADDENDUM:  The patient is excluded from the [address] [facility] Director's Order for postponement of non-essential surgeries and procedures since this would be a, \"threat to the patient's life if surgery or procedure is not performed. \"

## 2020-04-23 NOTE — PROCEDURES
[address] [place] [address]                                 PROCEDURE NOTE    PATIENT NAME: Bigg Coughlin                   :        1933  MED REC NO:   [id number]                           ROOM:       0017  ACCOUNT NO:   [de-identified]                           ADMIT DATE: 2020  PROVIDER:     Shanita Luther MD    DATE OF PROCEDURE:  2020    INDICATION FOR PROCEDURE:  ICD battery depletion. PROCEDURE PERFORMED:  ICD generator change single lead. SURGEON:  Shanita Luther MD    HARDWARE IMPLANTED:  A Medtronic Visia AF MRI VR SureScan, model  [id number], serial [id number]. HARDWARE EXPLANTED:  A Medtronic Protecta XT VR, model [id number], serial  [id number] implanted on 2013. CHRONIC LEAD RETAINED:  Right ventricle: A Medtronic model [id number] cm,  serial [id number], implanted on 2008. DESCRIPTION OF PROCEDURE:  The patient was brought back to cath lab #4  with informed consent signed and on the chart. An active timeout was  performed with the participating staff and physician. Device  availability was confirmed before beginning the procedure. The patient  was placed in the supine position on the procedure table and appropriate  monitor devices were attached to the patient. The patient's device was  interrogated and high voltage therapies were turned off. It was also  noted that the patient was pacemaker-dependent. The left pectoral area  was then prepped and draped using the usual sterile barrier OR  technique. Local anesthesia was used to anesthetize the previous  incision site. A #10 blade scalpel was used to make an incision through  the cuticular layer. Continuation of the dissection down to the capsule  was made with electrocautery. Bleeding vessels were identified and  coagulated with Bovie for hemostasis.   The capsule was incised and the  device was removed from the capsule without

## 2020-04-29 ENCOUNTER — NURSE ONLY (OUTPATIENT)
Dept: CARDIOLOGY CLINIC | Age: 85
End: 2020-04-29
Payer: MEDICARE

## 2020-04-29 PROCEDURE — 93282 PRGRMG EVAL IMPLANTABLE DFB: CPT | Performed by: INTERNAL MEDICINE

## 2020-04-29 NOTE — PROGRESS NOTES
Medtronic single icd  Battery 9.0yrs  V paced 94.7%  r wave >=80% paced  Ventricle threshold 1.25V @ 0.4ms  Ventricle impedance 342 ohms   Shock 36 ohms   svc 46 ohms     Site intact steri strips remain on, no reddness, no drainage, miminal swelling. Patient does have carelink at home.  Wound care and other instructions given, patient and son verbalize understanding

## 2020-05-04 ENCOUNTER — TELEPHONE (OUTPATIENT)
Dept: INTERNAL MEDICINE CLINIC | Age: 85
End: 2020-05-04

## 2020-05-04 RX ORDER — SACUBITRIL AND VALSARTAN 49; 51 MG/1; MG/1
1 TABLET, FILM COATED ORAL 2 TIMES DAILY
Qty: 28 TABLET | Refills: 0 | COMMUNITY
Start: 2020-05-04 | End: 2020-07-27 | Stop reason: SDUPTHER

## 2020-05-04 RX ORDER — LORAZEPAM 1 MG/1
TABLET ORAL
Qty: 30 TABLET | Refills: 0 | Status: SHIPPED | OUTPATIENT
Start: 2020-05-04 | End: 2020-05-14 | Stop reason: SDUPTHER

## 2020-05-11 ENCOUNTER — TELEPHONE (OUTPATIENT)
Dept: CARDIOLOGY CLINIC | Age: 85
End: 2020-05-11

## 2020-05-12 ENCOUNTER — OFFICE VISIT (OUTPATIENT)
Dept: CARDIOLOGY CLINIC | Age: 85
End: 2020-05-12
Payer: MEDICARE

## 2020-05-12 VITALS
HEART RATE: 74 BPM | WEIGHT: 182 LBS | SYSTOLIC BLOOD PRESSURE: 132 MMHG | HEIGHT: 70 IN | BODY MASS INDEX: 26.05 KG/M2 | DIASTOLIC BLOOD PRESSURE: 82 MMHG

## 2020-05-12 PROCEDURE — 1123F ACP DISCUSS/DSCN MKR DOCD: CPT | Performed by: INTERNAL MEDICINE

## 2020-05-12 PROCEDURE — G8417 CALC BMI ABV UP PARAM F/U: HCPCS | Performed by: INTERNAL MEDICINE

## 2020-05-12 PROCEDURE — G8427 DOCREV CUR MEDS BY ELIG CLIN: HCPCS | Performed by: INTERNAL MEDICINE

## 2020-05-12 PROCEDURE — 93000 ELECTROCARDIOGRAM COMPLETE: CPT | Performed by: INTERNAL MEDICINE

## 2020-05-12 PROCEDURE — 1036F TOBACCO NON-USER: CPT | Performed by: INTERNAL MEDICINE

## 2020-05-12 PROCEDURE — 99213 OFFICE O/P EST LOW 20 MIN: CPT | Performed by: INTERNAL MEDICINE

## 2020-05-12 PROCEDURE — 4040F PNEUMOC VAC/ADMIN/RCVD: CPT | Performed by: INTERNAL MEDICINE

## 2020-05-12 ASSESSMENT — ENCOUNTER SYMPTOMS
DIARRHEA: 0
BACK PAIN: 0
NAUSEA: 0
COUGH: 0
LEFT EYE: 0
SORE THROAT: 0
BLURRED VISION: 0
VOMITING: 0
ABDOMINAL PAIN: 0
CONSTIPATION: 0
RIGHT EYE: 0
SHORTNESS OF BREATH: 0
WHEEZING: 0
DOUBLE VISION: 0

## 2020-05-12 NOTE — PROGRESS NOTES
EKG obtained  S/p  ICD Gen change 04.22.20   Site is swollen/ steri strips still intact  Perm afib   Patient denies Light headed, dizziness, chest pain.

## 2020-05-14 ENCOUNTER — OFFICE VISIT (OUTPATIENT)
Dept: INTERNAL MEDICINE CLINIC | Age: 85
End: 2020-05-14
Payer: MEDICARE

## 2020-05-14 VITALS
HEIGHT: 68 IN | BODY MASS INDEX: 27.28 KG/M2 | DIASTOLIC BLOOD PRESSURE: 56 MMHG | HEART RATE: 91 BPM | WEIGHT: 180 LBS | SYSTOLIC BLOOD PRESSURE: 115 MMHG | TEMPERATURE: 97.5 F

## 2020-05-14 PROCEDURE — 99214 OFFICE O/P EST MOD 30 MIN: CPT | Performed by: INTERNAL MEDICINE

## 2020-05-14 PROCEDURE — G8417 CALC BMI ABV UP PARAM F/U: HCPCS | Performed by: INTERNAL MEDICINE

## 2020-05-14 PROCEDURE — 1036F TOBACCO NON-USER: CPT | Performed by: INTERNAL MEDICINE

## 2020-05-14 PROCEDURE — G8427 DOCREV CUR MEDS BY ELIG CLIN: HCPCS | Performed by: INTERNAL MEDICINE

## 2020-05-14 PROCEDURE — 1123F ACP DISCUSS/DSCN MKR DOCD: CPT | Performed by: INTERNAL MEDICINE

## 2020-05-14 PROCEDURE — 4040F PNEUMOC VAC/ADMIN/RCVD: CPT | Performed by: INTERNAL MEDICINE

## 2020-05-14 RX ORDER — LORAZEPAM 1 MG/1
1 TABLET ORAL NIGHTLY
Qty: 30 TABLET | Refills: 2 | Status: SHIPPED | OUTPATIENT
Start: 2020-05-14 | End: 2020-08-24

## 2020-05-14 ASSESSMENT — PATIENT HEALTH QUESTIONNAIRE - PHQ9
2. FEELING DOWN, DEPRESSED OR HOPELESS: 0
SUM OF ALL RESPONSES TO PHQ9 QUESTIONS 1 & 2: 0
SUM OF ALL RESPONSES TO PHQ QUESTIONS 1-9: 0
1. LITTLE INTEREST OR PLEASURE IN DOING THINGS: 0
SUM OF ALL RESPONSES TO PHQ QUESTIONS 1-9: 0

## 2020-05-14 NOTE — PROGRESS NOTES
(congestive heart failure) ([facility]    ICD (implantable cardioverter-defibrillator) battery depletion       Current Outpatient Medications   Medication Sig Dispense Refill    LORazepam (ATIVAN) 1 MG tablet Take 1 tablet by mouth nightly for 30 days. 30 tablet 2    sacubitril-valsartan (ENTRESTO) 49-51 MG per tablet Take 1 tablet by mouth 2 times daily 28 tablet 0    allopurinol (ZYLOPRIM) 100 MG tablet TAKE 1 TABLET BY MOUTH EVERY MORNING 90 tablet 3    atorvastatin (LIPITOR) 20 MG tablet TAKE 1 TABLET BY MOUTH ONE TIME A DAY AT NIGHT 90 tablet 2    carvedilol (COREG) 6.25 MG tablet Take 1 tablet by mouth 2 times daily 180 tablet 3    digoxin (DIGOX) 250 MCG tablet Take 1 tablet by mouth daily 90 tablet 3    donepezil (ARICEPT) 5 MG tablet TAKE 1 TABLET BY MOUTH EVERY NIGHT 90 tablet 5    furosemide (LASIX) 40 MG tablet TAKE 1 TABLET BY MOUTH DAILY 90 tablet 3    warfarin (COUMADIN) 5 MG tablet Take as directed by [facility]. 135 tablets = 90 days (Patient taking differently: Take as directed by [address] [place] [address]. 135 tablets = 90 days  5mg alternated every other day with 7.5 mg) 135 tablet 3    Melatonin 2.5 MG CHEW Take 1 tablet by mouth nightly      Lidocaine-Methyl Sal-Camphor (VIVA EX) Apply topically nightly      loperamide (IMODIUM) 2 MG capsule Take 2 mg by mouth See Admin Instructions Indications: Diarrhea      Multiple Vitamins-Minerals (THERAPEUTIC MULTIVITAMIN-MINERALS) tablet Take 1 tablet by mouth 2 times daily Indications: Nutritional Support Noon and Supper      acetaminophen (TYLENOL) 325 MG tablet Take 650 mg by mouth every 6 hours as needed for Pain or Fever Indications: Pain Don't take more than 3,000 mg per day      Calcium Carbonate-Vitamin D (CALCIUM + D PO) Take 1 tablet by mouth 2 times daily Indications: Treatment to Prevent Vitamin Deficiency Noon and Supper      aspirin 81 MG EC tablet Take 81 mg by mouth nightly Indications: Anticoagulant Therapy With food. No current facility-administered medications for this visit. No Known Allergies    Review of Systems - General ROS: negative  Psychological ROS: negative  Hematological and Lymphatic ROS: No history of blood clots or bleeding disorder. Respiratory ROS:noo shortness of breath, or wheezing  Cardiovascular ROS: he does have dyspnea on exertion, usually about one block  Gastrointestinal ROS: no abdominal pain, change in bowel habits, or black or bloody stools  Genito-Urinary ROS: no dysuria, hesitancy, nocturia present  Musculoskeletal ROS:  negative  Neurological ROS: no TIA or stroke symptoms  Dermatological ROS: negative    Blood pressure (!) 115/56, pulse 91, temperature 97.5 °F (36.4 °C), height 5' 8\" (1.727 m), weight 180 lb (81.6 kg). Body mass index is 27.37 kg/m². Physical Examination: General appearance - alert, well appearing, and in no distress  Mental status - alert, oriented to person, place, and time  Neck - Neck is supple, no JVD or carotid bruits. No thyromegaly or adenopathy. Chest - clear to auscultation, no wheezes, rales or rhonchi, symmetric air entry  Heart - normal rate, regular rhythm, normal S1, S2, loud 3/6 systolic murmur over the entire precordium, no rubs, clicks or gallops  Abdomen - soft, nontender, nondistended, no masses or organomegaly  Neurological - alert, oriented, normal speech, no focal findings or movement disorder noted  Extremities - peripheral pulses normal, no pedal edema, no clubbing or cyanosis  Skin - normal coloration and turgor, no rashes, no suspicious skin lesions noted    No orders of the defined types were placed in this encounter. Outpatient Encounter Medications as of 5/14/2020   Medication Sig Dispense Refill    LORazepam (ATIVAN) 1 MG tablet Take 1 tablet by mouth nightly for 30 days.  30 tablet 2    sacubitril-valsartan (ENTRESTO) 49-51 MG per tablet Take 1 tablet by mouth 2 times daily 28 tablet 0    allopurinol (ZYLOPRIM) 100 MG 1 MG tablet   3. Permanent atrial fibrillation     4. H/O: gout     5. Essential hypertension     6. S/P CABG (coronary artery bypass graft)     7. Hyperlipidemia, unspecified hyperlipidemia type     8. S/P MVR (mitral valve repair), Physio ring, 2008     9. S/P AVR (aortic valve replacement)     10. Kaposi's sarcoma ([facility]     11. ICD (implantable cardioverter-defibrillator) in place             Patient has chronic congestive heart failure     Heart healthy , low salt diet essential    Daily weights with report of weight gain over 5 pounds    Take medications as prescribed    Avoid excess intake of fluids  Blood pressure always runs around 968 systolic  I reviewed patient's medications and possible interactions and side effects. I refilled those that were needed.     Routine ICD checks      I do give him Ativan to help him sleep  He wants more I told him I did not think that was a good idea  Son agrees  Follow up 4 months

## 2020-05-15 ENCOUNTER — HOSPITAL ENCOUNTER (OUTPATIENT)
Dept: NON INVASIVE DIAGNOSTICS | Age: 85
Discharge: HOME | End: 2020-05-15
Payer: MEDICARE

## 2020-05-15 LAB
LV EF: 43 %
LVEF MODALITY: NORMAL

## 2020-05-15 PROCEDURE — 93306 TTE W/DOPPLER COMPLETE: CPT

## 2020-05-21 ENCOUNTER — TELEPHONE (OUTPATIENT)
Dept: CARDIOLOGY CLINIC | Age: 85
End: 2020-05-21

## 2020-05-21 NOTE — TELEPHONE ENCOUNTER
No major changes. Valves look good and EF has decreased slightly since prior ECHO. HE can f/u with Dr. Juan C Alexander to discuss results.

## 2020-05-26 ENCOUNTER — PROCEDURE VISIT (OUTPATIENT)
Dept: CARDIOLOGY CLINIC | Age: 85
End: 2020-05-26
Payer: MEDICARE

## 2020-05-26 PROCEDURE — G2066 INTER DEVC REMOTE 30D: HCPCS | Performed by: INTERNAL MEDICINE

## 2020-05-26 PROCEDURE — 93297 REM INTERROG DEV EVAL ICPMS: CPT | Performed by: INTERNAL MEDICINE

## 2020-06-02 ENCOUNTER — HOSPITAL ENCOUNTER (OUTPATIENT)
Dept: PHARMACY | Age: 85
Setting detail: THERAPIES SERIES
Discharge: HOME | End: 2020-06-02
Payer: MEDICARE

## 2020-06-02 ENCOUNTER — NURSE ONLY (OUTPATIENT)
Dept: LAB | Age: 85
End: 2020-06-02

## 2020-06-02 LAB — INR BLD: 2.24 (ref 0.85–1.13)

## 2020-06-02 PROCEDURE — 99211 OFF/OP EST MAY X REQ PHY/QHP: CPT | Performed by: PHARMACIST

## 2020-06-02 NOTE — PROGRESS NOTES
Yolanda  Verbiage for CPA Consent:  Discussed with patient the Pharmacist Collaborative Practice Agreement. Patient provided verbal and/or electronic (exJadene Deis) consent to be managed by a pharmacist per collaborative practice agreement between the pharmacist and referring physician. This is in lieu of paper consent due to COVID-19 precautions and the use of remote/virtual visits. CLINICAL PHARMACY CONSULT: MED RECONCILIATION/REVIEW ADDENDUM    For Pharmacy Admin Tracking Only    PHSO: No  Total # of Interventions Recommended: 0  - Updated Order #: 1 Updated Order Reason(s):  Other updated home med list  - Maintenance Safety Lab Monitoring #: 1  Total Interventions Accepted: 0  Time Spent (min): Πάνου 90, PharmD  [address]

## 2020-07-14 ENCOUNTER — HOSPITAL ENCOUNTER (OUTPATIENT)
Dept: PHARMACY | Age: 85
Setting detail: THERAPIES SERIES
Discharge: HOME | End: 2020-07-14
Payer: MEDICARE

## 2020-07-14 ENCOUNTER — NURSE ONLY (OUTPATIENT)
Dept: LAB | Age: 85
End: 2020-07-14

## 2020-07-14 LAB — INR BLD: 3.05 (ref 0.85–1.13)

## 2020-07-14 PROCEDURE — 99211 OFF/OP EST MAY X REQ PHY/QHP: CPT | Performed by: PHARMACIST

## 2020-07-27 ENCOUNTER — TELEPHONE (OUTPATIENT)
Dept: CARDIOLOGY CLINIC | Age: 85
End: 2020-07-27

## 2020-07-29 RX ORDER — SACUBITRIL AND VALSARTAN 49; 51 MG/1; MG/1
1 TABLET, FILM COATED ORAL 2 TIMES DAILY
Qty: 60 TABLET | Refills: 0 | Status: SHIPPED | OUTPATIENT
Start: 2020-07-29 | End: 2020-08-25 | Stop reason: SDUPTHER

## 2020-08-04 ENCOUNTER — PROCEDURE VISIT (OUTPATIENT)
Dept: CARDIOLOGY CLINIC | Age: 85
End: 2020-08-04
Payer: MEDICARE

## 2020-08-04 ENCOUNTER — HOSPITAL ENCOUNTER (OUTPATIENT)
Dept: PHARMACY | Age: 85
Setting detail: THERAPIES SERIES
Discharge: HOME | End: 2020-08-04
Payer: MEDICARE

## 2020-08-04 VITALS — TEMPERATURE: 98 F

## 2020-08-04 LAB — POC INR: 2.5 (ref 0.8–1.2)

## 2020-08-04 PROCEDURE — 85610 PROTHROMBIN TIME: CPT

## 2020-08-04 PROCEDURE — 93296 REM INTERROG EVL PM/IDS: CPT | Performed by: NUCLEAR MEDICINE

## 2020-08-04 PROCEDURE — 36416 COLLJ CAPILLARY BLOOD SPEC: CPT

## 2020-08-04 PROCEDURE — 93295 DEV INTERROG REMOTE 1/2/MLT: CPT | Performed by: NUCLEAR MEDICINE

## 2020-08-04 PROCEDURE — 99211 OFF/OP EST MAY X REQ PHY/QHP: CPT

## 2020-08-04 NOTE — PROGRESS NOTES
mdt single icd  Battery 8.7yrs  V paced 92.1%  r wave 1.6mV  Ventricle threshold 1.25V @0.40ms  V.  Impedance 342ohms  Shock 37ohms  svc 46 ohms  2 episodes NSVT longest 3 seconds fastest is 188

## 2020-08-06 LAB
HCT VFR BLD CALC: 32.2 % (ref 39–49)
HEMOGLOBIN: 10.6 G/DL (ref 13–17)

## 2020-08-25 RX ORDER — SACUBITRIL AND VALSARTAN 49; 51 MG/1; MG/1
1 TABLET, FILM COATED ORAL 2 TIMES DAILY
Qty: 60 TABLET | Refills: 2 | Status: SHIPPED | OUTPATIENT
Start: 2020-08-25 | End: 2020-09-17 | Stop reason: SDUPTHER

## 2020-08-25 RX ORDER — LORAZEPAM 1 MG/1
TABLET ORAL
Qty: 30 TABLET | Refills: 0 | Status: SHIPPED | OUTPATIENT
Start: 2020-08-25 | End: 2020-09-17 | Stop reason: SDUPTHER

## 2020-09-02 ENCOUNTER — HOSPITAL ENCOUNTER (OUTPATIENT)
Dept: PHARMACY | Age: 85
Setting detail: THERAPIES SERIES
Discharge: HOME | End: 2020-09-02
Payer: MEDICARE

## 2020-09-02 VITALS — TEMPERATURE: 98 F

## 2020-09-02 LAB — POC INR: 2.5 (ref 0.8–1.2)

## 2020-09-02 PROCEDURE — 36416 COLLJ CAPILLARY BLOOD SPEC: CPT

## 2020-09-02 PROCEDURE — 85610 PROTHROMBIN TIME: CPT

## 2020-09-02 PROCEDURE — 99211 OFF/OP EST MAY X REQ PHY/QHP: CPT

## 2020-09-02 NOTE — PROGRESS NOTES
Medication Management [address]   [phone] (phone)  [phone] (fax)      Mr. Best Dee is a 80 y.o.  male with history of persistent atrial fib. , per Dr. Chaz English referral, who presents today for Warfarin monitoring and adjustment (4 week visit). Patient, with help of son, verifies current dosing regimen and tablet strength. No missed or extra doses. Patient denies  bleeding/bruising/swelling/SOB/chest pain. No blood in urine or stool. No dietary changes. No changes in medication/OTC agents/herbals. No change in alcohol use or tobacco use. No change in activity level. Patient denies headaches/dizziness/lightheadedness/falls. No vomiting/diarrhea or acute illness. No procedures scheduled in the future at this time. Assessment:   Lab Results   Component Value Date    INR 2.50 (H) 09/02/2020    INR 2.50 (H) 08/04/2020    INR 3.05 (H) 07/14/2020     INR therapeutic - goal 2-3. Recent Labs     09/02/20  0901   INR 2.50*     Did routine H&H 8/5: 10.6/32.2 (10.8/34.3 in February). Plan:  POCT INR ordered/performed/result reviewed. Continue PO Coumadin 5 mg MWF, 7.5 mg TThSS. Recheck INR in 4 week(s). Patient reminded to call the Anticoagulation Clinic with any signs or symptoms of bleeding or with any medication changes. Patient given instructions utilizing the teach back method. Discharged ambulatory in no apparent distress, wearing mask, with son. After visit summary printed and reviewed with patient.       Medications reviewed and updated on home medication list.    Influenza vaccine:     [] given    [] declined   [] received previously   [] plans to receive at a later time   [] refused    [] documented in EPIC

## 2020-09-08 ENCOUNTER — PROCEDURE VISIT (OUTPATIENT)
Dept: CARDIOLOGY CLINIC | Age: 85
End: 2020-09-08
Payer: MEDICARE

## 2020-09-08 PROCEDURE — G2066 INTER DEVC REMOTE 30D: HCPCS | Performed by: NUCLEAR MEDICINE

## 2020-09-08 PROCEDURE — 93297 REM INTERROG DEV EVAL ICPMS: CPT | Performed by: NUCLEAR MEDICINE

## 2020-09-17 ENCOUNTER — OFFICE VISIT (OUTPATIENT)
Dept: INTERNAL MEDICINE CLINIC | Age: 85
End: 2020-09-17
Payer: MEDICARE

## 2020-09-17 VITALS
WEIGHT: 180 LBS | TEMPERATURE: 98.1 F | SYSTOLIC BLOOD PRESSURE: 109 MMHG | BODY MASS INDEX: 27.28 KG/M2 | HEART RATE: 94 BPM | DIASTOLIC BLOOD PRESSURE: 66 MMHG | HEIGHT: 68 IN

## 2020-09-17 PROCEDURE — 1036F TOBACCO NON-USER: CPT | Performed by: INTERNAL MEDICINE

## 2020-09-17 PROCEDURE — G8427 DOCREV CUR MEDS BY ELIG CLIN: HCPCS | Performed by: INTERNAL MEDICINE

## 2020-09-17 PROCEDURE — 99214 OFFICE O/P EST MOD 30 MIN: CPT | Performed by: INTERNAL MEDICINE

## 2020-09-17 PROCEDURE — 4040F PNEUMOC VAC/ADMIN/RCVD: CPT | Performed by: INTERNAL MEDICINE

## 2020-09-17 PROCEDURE — G8417 CALC BMI ABV UP PARAM F/U: HCPCS | Performed by: INTERNAL MEDICINE

## 2020-09-17 PROCEDURE — 1123F ACP DISCUSS/DSCN MKR DOCD: CPT | Performed by: INTERNAL MEDICINE

## 2020-09-17 RX ORDER — ALLOPURINOL 100 MG/1
TABLET ORAL
Qty: 90 TABLET | Refills: 3 | Status: SHIPPED | OUTPATIENT
Start: 2020-09-17 | End: 2021-01-21 | Stop reason: SDUPTHER

## 2020-09-17 RX ORDER — LORAZEPAM 1 MG/1
TABLET ORAL
Qty: 30 TABLET | Refills: 2 | Status: SHIPPED | OUTPATIENT
Start: 2020-09-17 | End: 2020-10-17

## 2020-09-17 RX ORDER — DIGOXIN 250 MCG
250 TABLET ORAL DAILY
Qty: 90 TABLET | Refills: 3 | Status: SHIPPED | OUTPATIENT
Start: 2020-09-17 | End: 2021-01-21 | Stop reason: SDUPTHER

## 2020-09-17 RX ORDER — SACUBITRIL AND VALSARTAN 49; 51 MG/1; MG/1
1 TABLET, FILM COATED ORAL 2 TIMES DAILY
Qty: 60 TABLET | Refills: 2 | Status: SHIPPED | OUTPATIENT
Start: 2020-09-17 | End: 2020-10-26 | Stop reason: SDUPTHER

## 2020-09-17 RX ORDER — WARFARIN SODIUM 5 MG/1
TABLET ORAL
Qty: 135 TABLET | Refills: 3 | Status: SHIPPED | OUTPATIENT
Start: 2020-09-17 | End: 2021-01-21 | Stop reason: SDUPTHER

## 2020-09-17 RX ORDER — FUROSEMIDE 40 MG/1
TABLET ORAL
Qty: 90 TABLET | Refills: 3 | Status: SHIPPED | OUTPATIENT
Start: 2020-09-17 | End: 2021-01-21 | Stop reason: SDUPTHER

## 2020-09-17 RX ORDER — ATORVASTATIN CALCIUM 20 MG/1
TABLET, FILM COATED ORAL
Qty: 90 TABLET | Refills: 2 | Status: SHIPPED | OUTPATIENT
Start: 2020-09-17 | End: 2021-01-21 | Stop reason: SDUPTHER

## 2020-09-17 RX ORDER — DONEPEZIL HYDROCHLORIDE 5 MG/1
TABLET, FILM COATED ORAL
Qty: 90 TABLET | Refills: 5 | Status: SHIPPED | OUTPATIENT
Start: 2020-09-17 | End: 2021-01-21 | Stop reason: SDUPTHER

## 2020-09-17 RX ORDER — CARVEDILOL 6.25 MG/1
6.25 TABLET ORAL 2 TIMES DAILY
Qty: 180 TABLET | Refills: 3 | Status: SHIPPED | OUTPATIENT
Start: 2020-09-17 | End: 2021-01-21 | Stop reason: SDUPTHER

## 2020-09-17 NOTE — PROGRESS NOTES
Chief Complaint   Patient presents with    Atrial Fibrillation     4 MONTH FU    Hypertension       Patient presents for medical evaluation. I last saw the patient 4 months   ago. Patient is followed on a regular basis by Dr. Gabi Lay  Patient has not had any admissions to the hospital or change in medications since the last visit here. Pertinent past history reviewed and summarized below  He has an ICD with EF 25%   He has permanent atrial fibrillation, on warfarin. He had CABG, MR repair 2008    Patient says 2 years ago he felt a jolt in his chest.  He said his arm started shaking. He laid down, symptoms went away. 3 days later he went and got his defibrillator checked. He had an episode of anti-tachycardia pacing for ventricular tachycardia. He has had no problems since. He feels fine  I started coreg and had him stop driving  He is back to driving. Interrogation of his ICD shows 13 beats of ventricular tachycardia in June    He was admitted in October 2017 with chest pain. Dr. Gracia Pinedo did a cardiac catheterization which did show some CAD, not really amenable to intervention. Echo showed EF up to 50%. He is managed medically. He has done well  e is doing well. Denies weight gain. No orthopnea or PND.   He says he no longer sees Dr. Gracia Pinedo  He was seeing Dr. Hannah Hernandes  He had battery replacement for his ICD on 4/22 with Dr. Hannah Hernandes      Patient Active Problem List   Diagnosis    CAD (coronary artery disease)    Aortic stenosis    SSS (sick sinus syndrome) ([address]    Hyperlipidemia    [facility]osi's sarcoma [facility]    S/P CABG (coronary artery bypass graft)    Medtronic single ICD    GERD (gastroesophageal reflux disease)    Hyperuricemia    HTN (hypertension)    S/P MVR (mitral valve repair), Physio ring, 2008    Persistent atrial fibrillation    Pacemaker    Dysphagia    Hoarseness    Laryngeal disorder    S/P AVR (aortic valve replacement)    Anticoagulated on Coumadin    Mild cognitive impairment    Chronic systolic CHF (congestive heart failure) [facility]    ICD (implantable cardioverter-defibrillator) battery depletion       Current Outpatient Medications   Medication Sig Dispense Refill    warfarin (COUMADIN) 5 MG tablet Take as directed by [address] [place] [address] [facility]. 135 tablets = 90 days 135 tablet 3    sacubitril-valsartan (ENTRESTO) 49-51 MG per tablet Take 1 tablet by mouth 2 times daily 60 tablet 2    LORazepam (ATIVAN) 1 MG tablet TAKE 1 TABLET BY MOUTH ONE TIME A DAY AT NIGHT 30 tablet 2    furosemide (LASIX) 40 MG tablet TAKE 1 TABLET BY MOUTH DAILY 90 tablet 3    donepezil (ARICEPT) 5 MG tablet TAKE 1 TABLET BY MOUTH EVERY NIGHT 90 tablet 5    digoxin (DIGOX) 250 MCG tablet Take 1 tablet by mouth daily 90 tablet 3    carvedilol (COREG) 6.25 MG tablet Take 1 tablet by mouth 2 times daily 180 tablet 3    atorvastatin (LIPITOR) 20 MG tablet TAKE 1 TABLET BY MOUTH ONE TIME A DAY AT NIGHT 90 tablet 2    allopurinol (ZYLOPRIM) 100 MG tablet TAKE 1 TABLET BY MOUTH EVERY MORNING 90 tablet 3    Melatonin 2.5 MG CHEW Take 1 tablet by mouth nightly      Lidocaine-Methyl Sal-Camphor (VIVA EX) Apply topically nightly      loperamide (IMODIUM) 2 MG capsule Take 2 mg by mouth See Admin Instructions Indications: Diarrhea      Multiple Vitamins-Minerals (THERAPEUTIC MULTIVITAMIN-MINERALS) tablet Take 1 tablet by mouth 2 times daily Indications: Nutritional Support Noon and Supper      acetaminophen (TYLENOL) 325 MG tablet Take 650 mg by mouth every 6 hours as needed for Pain or Fever Indications: Pain Don't take more than 3,000 mg per day      Calcium Carbonate-Vitamin D (CALCIUM + D PO) Take 1 tablet by mouth 2 times daily Indications: Treatment to Prevent Vitamin Deficiency Noon and Supper      aspirin 81 MG EC tablet Take 81 mg by mouth nightly Indications: Anticoagulant Therapy With food. No current facility-administered medications for this visit.         No Known Allergies    Review of Systems - General ROS: negative  Psychological ROS: negative  Hematological and Lymphatic ROS: No history of blood clots or bleeding disorder. Respiratory ROS:noo shortness of breath, or wheezing  Cardiovascular ROS: he does have dyspnea on exertion, usually about one block  Gastrointestinal ROS: no abdominal pain, change in bowel habits, or black or bloody stools  Genito-Urinary ROS: no dysuria, hesitancy, nocturia present  Musculoskeletal ROS:  negative  Neurological ROS: no TIA or stroke symptoms  Dermatological ROS: negative    Blood pressure 109/66, pulse 94, temperature 98.1 °F (36.7 °C), height 5' 8\" (1.727 m), weight 180 lb (81.6 kg). Body mass index is 27.37 kg/m². Physical Examination: General appearance - alert, well appearing, and in no distress  Mental status - alert, oriented to person, place, and time  Neck - Neck is supple, no JVD or carotid bruits. No thyromegaly or adenopathy. Chest - clear to auscultation, no wheezes, rales or rhonchi, symmetric air entry  Heart - normal rate, regular rhythm, normal S1, S2, loud 3/6 systolic murmur over the entire precordium, no rubs, clicks or gallops  Abdomen - soft, nontender, nondistended, no masses or organomegaly  Neurological - alert, oriented, normal speech, no focal findings or movement disorder noted  Extremities - peripheral pulses normal, no pedal edema, no clubbing or cyanosis  Skin - normal coloration and turgor, no rashes, no suspicious skin lesions noted    No orders of the defined types were placed in this encounter. Outpatient Encounter Medications as of 9/17/2020   Medication Sig Dispense Refill    warfarin (COUMADIN) 5 MG tablet Take as directed by [facility].  135 tablets = 90 days 135 tablet 3    sacubitril-valsartan (ENTRESTO) 49-51 MG per tablet Take 1 tablet by mouth 2 times daily 60 tablet 2    LORazepam (ATIVAN) 1 MG tablet TAKE 1 TABLET BY MOUTH ONE TIME A DAY AT NIGHT 30 tablet 2    furosemide (LASIX) 40 MG tablet TAKE 1 TABLET BY MOUTH DAILY 90 tablet 3    donepezil (ARICEPT) 5 MG tablet TAKE 1 TABLET BY MOUTH EVERY NIGHT 90 tablet 5    digoxin (DIGOX) 250 MCG tablet Take 1 tablet by mouth daily 90 tablet 3    carvedilol (COREG) 6.25 MG tablet Take 1 tablet by mouth 2 times daily 180 tablet 3    atorvastatin (LIPITOR) 20 MG tablet TAKE 1 TABLET BY MOUTH ONE TIME A DAY AT NIGHT 90 tablet 2    allopurinol (ZYLOPRIM) 100 MG tablet TAKE 1 TABLET BY MOUTH EVERY MORNING 90 tablet 3    Melatonin 2.5 MG CHEW Take 1 tablet by mouth nightly      Lidocaine-Methyl Sal-Camphor (VIVA EX) Apply topically nightly      loperamide (IMODIUM) 2 MG capsule Take 2 mg by mouth See Admin Instructions Indications: Diarrhea      Multiple Vitamins-Minerals (THERAPEUTIC MULTIVITAMIN-MINERALS) tablet Take 1 tablet by mouth 2 times daily Indications: Nutritional Support Noon and Supper      acetaminophen (TYLENOL) 325 MG tablet Take 650 mg by mouth every 6 hours as needed for Pain or Fever Indications: Pain Don't take more than 3,000 mg per day      Calcium Carbonate-Vitamin D (CALCIUM + D PO) Take 1 tablet by mouth 2 times daily Indications: Treatment to Prevent Vitamin Deficiency Noon and Supper      aspirin 81 MG EC tablet Take 81 mg by mouth nightly Indications: Anticoagulant Therapy With food.       [DISCONTINUED] LORazepam (ATIVAN) 1 MG tablet TAKE 1 TABLET BY MOUTH ONE TIME A DAY AT NIGHT 30 tablet 0    [DISCONTINUED] sacubitril-valsartan (ENTRESTO) 49-51 MG per tablet Take 1 tablet by mouth 2 times daily 60 tablet 2    [DISCONTINUED] allopurinol (ZYLOPRIM) 100 MG tablet TAKE 1 TABLET BY MOUTH EVERY MORNING 90 tablet 3    [DISCONTINUED] atorvastatin (LIPITOR) 20 MG tablet TAKE 1 TABLET BY MOUTH ONE TIME A DAY AT NIGHT 90 tablet 2    [DISCONTINUED] carvedilol (COREG) 6.25 MG tablet Take 1 tablet by mouth 2 times daily 180 tablet 3    [DISCONTINUED] digoxin (DIGOX) 250 MCG tablet Take 1 tablet by mouth daily 90 tablet 3  [DISCONTINUED] donepezil (ARICEPT) 5 MG tablet TAKE 1 TABLET BY MOUTH EVERY NIGHT 90 tablet 5    [DISCONTINUED] furosemide (LASIX) 40 MG tablet TAKE 1 TABLET BY MOUTH DAILY 90 tablet 3    [DISCONTINUED] warfarin (COUMADIN) 5 MG tablet Take as directed by [facility]. 135 tablets = 90 days 135 tablet 3     No facility-administered encounter medications on file as of 9/17/2020. DIAGNOSES   Diagnosis Orders   1. Chronic systolic congestive heart failure (HCC)  furosemide (LASIX) 40 MG tablet    digoxin (DIGOX) 250 MCG tablet    carvedilol (COREG) 6.25 MG tablet   2. Permanent atrial fibrillation  warfarin (COUMADIN) 5 MG tablet   3. Essential hypertension  sacubitril-valsartan (ENTRESTO) 49-51 MG per tablet   4. Anxiety  LORazepam (ATIVAN) 1 MG tablet   5. Mild cognitive impairment  donepezil (ARICEPT) 5 MG tablet   6. Hyperlipidemia, unspecified hyperlipidemia type  atorvastatin (LIPITOR) 20 MG tablet   7. H/O: gout  allopurinol (ZYLOPRIM) 100 MG tablet           Patient has chronic congestive heart failure     Heart healthy , low salt diet essential    Daily weights with report of weight gain over 5 pounds    Take medications as prescribed    Avoid excess intake of fluids  Blood pressure always runs around 298 systolic  I reviewed patient's medications and possible interactions and side effects. I refilled those that were needed.     Routine ICD checks      I do give him Ativan to help him sleep  He wants more I told him I did not think that was a good idea  Son agrees  Follow up 4 months

## 2020-09-30 ENCOUNTER — HOSPITAL ENCOUNTER (OUTPATIENT)
Dept: PHARMACY | Age: 85
Setting detail: THERAPIES SERIES
Discharge: HOME | End: 2020-09-30
Payer: MEDICARE

## 2020-09-30 VITALS — TEMPERATURE: 97 F

## 2020-09-30 LAB — POC INR: 2.1 (ref 0.8–1.2)

## 2020-09-30 PROCEDURE — 36416 COLLJ CAPILLARY BLOOD SPEC: CPT

## 2020-09-30 PROCEDURE — 85610 PROTHROMBIN TIME: CPT

## 2020-09-30 PROCEDURE — 99211 OFF/OP EST MAY X REQ PHY/QHP: CPT

## 2020-10-13 ENCOUNTER — PROCEDURE VISIT (OUTPATIENT)
Dept: CARDIOLOGY CLINIC | Age: 85
End: 2020-10-13
Payer: MEDICARE

## 2020-10-13 PROCEDURE — 93297 REM INTERROG DEV EVAL ICPMS: CPT | Performed by: INTERNAL MEDICINE

## 2020-10-13 PROCEDURE — G2066 INTER DEVC REMOTE 30D: HCPCS | Performed by: INTERNAL MEDICINE

## 2020-10-26 RX ORDER — SACUBITRIL AND VALSARTAN 49; 51 MG/1; MG/1
1 TABLET, FILM COATED ORAL 2 TIMES DAILY
Qty: 112 TABLET | Refills: 0 | COMMUNITY
Start: 2020-10-26 | End: 2020-12-30 | Stop reason: DRUGHIGH

## 2020-10-28 ENCOUNTER — HOSPITAL ENCOUNTER (OUTPATIENT)
Dept: PHARMACY | Age: 85
Setting detail: THERAPIES SERIES
Discharge: HOME | End: 2020-10-28
Payer: MEDICARE

## 2020-10-28 VITALS — TEMPERATURE: 97.9 F

## 2020-10-28 LAB — POC INR: 2.2 (ref 0.8–1.2)

## 2020-10-28 PROCEDURE — 99211 OFF/OP EST MAY X REQ PHY/QHP: CPT

## 2020-10-28 PROCEDURE — 36416 COLLJ CAPILLARY BLOOD SPEC: CPT

## 2020-10-28 PROCEDURE — 85610 PROTHROMBIN TIME: CPT

## 2020-10-28 NOTE — PROGRESS NOTES
Mr. Edward Osborne is a 80 y.o.  male with history of persistent atrial fib. , per Dr. Duane Phoenix referral, who presents today for Warfarin monitoring and adjustment (4 week visit). Patient verifies tablet strength. Followed printed instructions for dose. No missed or extra doses. Patient denies bleeding/bruising/swelling. Has usual intermittent SOB and fleeting left chest pain. No blood in urine or stool. No dietary changes. No changes in medication/OTC agents/herbals. No change in alcohol use or tobacco use. No change in activity level. Patient denies headaches/dizziness/lightheadedness/falls. No vomiting/diarrhea or acute illness. No procedures scheduled in the future at this time. Assessment:   Lab Results   Component Value Date    INR 2.20 (H) 10/28/2020    INR 2.10 (H) 09/30/2020    INR 2.50 (H) 09/02/2020     INR therapeutic - goal 2-3. Recent Labs     10/28/20  0904   INR 2.20*     Plan:  POCT INR ordered/performedr/esult reviewed. Continue PO Coumadin 5 mg MWF, 7.5 mg TThSS. Recheck INR in 4 week(s). Patient reminded to call the Anticoagulation Clinic with any signs or symptoms of bleeding or with any medication changes. Patient given instructions utilizing the teach back method. Discharged ambulatory in no apparent distress, wearing mask, with son. After visit summary printed and reviewed with patient.       Medications reviewed and updated on home medication list.    Influenza vaccine:     [] given    [] declined   [] received previously   [] plans to receive at a later time   [] refused    [] documented in Epic

## 2020-11-17 ENCOUNTER — PROCEDURE VISIT (OUTPATIENT)
Dept: CARDIOLOGY CLINIC | Age: 85
End: 2020-11-17
Payer: MEDICARE

## 2020-11-17 PROCEDURE — 93296 REM INTERROG EVL PM/IDS: CPT | Performed by: INTERNAL MEDICINE

## 2020-11-17 PROCEDURE — 93295 DEV INTERROG REMOTE 1/2/MLT: CPT | Performed by: INTERNAL MEDICINE

## 2020-11-25 ENCOUNTER — HOSPITAL ENCOUNTER (OUTPATIENT)
Dept: PHARMACY | Age: 85
Setting detail: THERAPIES SERIES
Discharge: HOME | End: 2020-11-25
Payer: MEDICARE

## 2020-11-25 VITALS — TEMPERATURE: 97.7 F

## 2020-11-25 LAB — POC INR: 2.1 (ref 0.8–1.2)

## 2020-11-25 PROCEDURE — 85610 PROTHROMBIN TIME: CPT

## 2020-11-25 PROCEDURE — 36416 COLLJ CAPILLARY BLOOD SPEC: CPT

## 2020-11-25 PROCEDURE — 99211 OFF/OP EST MAY X REQ PHY/QHP: CPT

## 2020-11-30 ENCOUNTER — NURSE ONLY (OUTPATIENT)
Dept: LAB | Age: 85
End: 2020-11-30

## 2020-11-30 ENCOUNTER — NURSE TRIAGE (OUTPATIENT)
Dept: OTHER | Facility: CLINIC | Age: 85
End: 2020-11-30

## 2020-11-30 ENCOUNTER — OFFICE VISIT (OUTPATIENT)
Dept: INTERNAL MEDICINE CLINIC | Age: 85
End: 2020-11-30
Payer: MEDICARE

## 2020-11-30 VITALS
TEMPERATURE: 97.5 F | HEIGHT: 68 IN | BODY MASS INDEX: 27.43 KG/M2 | DIASTOLIC BLOOD PRESSURE: 64 MMHG | SYSTOLIC BLOOD PRESSURE: 125 MMHG | HEART RATE: 92 BPM | WEIGHT: 181 LBS

## 2020-11-30 LAB
ANION GAP SERPL CALCULATED.3IONS-SCNC: 9 MEQ/L (ref 8–16)
BUN BLDV-MCNC: 15 MG/DL (ref 7–22)
CALCIUM SERPL-MCNC: 10.2 MG/DL (ref 8.5–10.5)
CHLORIDE BLD-SCNC: 103 MEQ/L (ref 98–111)
CO2: 28 MEQ/L (ref 23–33)
CREAT SERPL-MCNC: 0.9 MG/DL (ref 0.4–1.2)
ERYTHROCYTE [DISTWIDTH] IN BLOOD BY AUTOMATED COUNT: 18.8 % (ref 11.5–14.5)
ERYTHROCYTE [DISTWIDTH] IN BLOOD BY AUTOMATED COUNT: 55.8 FL (ref 35–45)
GFR SERPL CREATININE-BSD FRML MDRD: 80 ML/MIN/1.73M2
GLUCOSE BLD-MCNC: 94 MG/DL (ref 70–108)
HCT VFR BLD CALC: 39.2 % (ref 42–52)
HEMOGLOBIN: 12.3 GM/DL (ref 14–18)
MCH RBC QN AUTO: 25.9 PG (ref 26–33)
MCHC RBC AUTO-ENTMCNC: 31.4 GM/DL (ref 32.2–35.5)
MCV RBC AUTO: 82.5 FL (ref 80–94)
PLATELET # BLD: 220 THOU/MM3 (ref 130–400)
PMV BLD AUTO: 11.7 FL (ref 9.4–12.4)
POTASSIUM SERPL-SCNC: 4.1 MEQ/L (ref 3.5–5.2)
RBC # BLD: 4.75 MILL/MM3 (ref 4.7–6.1)
SODIUM BLD-SCNC: 140 MEQ/L (ref 135–145)
WBC # BLD: 6.1 THOU/MM3 (ref 4.8–10.8)

## 2020-11-30 PROCEDURE — G8427 DOCREV CUR MEDS BY ELIG CLIN: HCPCS | Performed by: INTERNAL MEDICINE

## 2020-11-30 PROCEDURE — 99214 OFFICE O/P EST MOD 30 MIN: CPT | Performed by: INTERNAL MEDICINE

## 2020-11-30 PROCEDURE — 4040F PNEUMOC VAC/ADMIN/RCVD: CPT | Performed by: INTERNAL MEDICINE

## 2020-11-30 PROCEDURE — G8417 CALC BMI ABV UP PARAM F/U: HCPCS | Performed by: INTERNAL MEDICINE

## 2020-11-30 PROCEDURE — 1123F ACP DISCUSS/DSCN MKR DOCD: CPT | Performed by: INTERNAL MEDICINE

## 2020-11-30 PROCEDURE — 1036F TOBACCO NON-USER: CPT | Performed by: INTERNAL MEDICINE

## 2020-11-30 PROCEDURE — G8484 FLU IMMUNIZE NO ADMIN: HCPCS | Performed by: INTERNAL MEDICINE

## 2020-11-30 RX ORDER — SACUBITRIL AND VALSARTAN 24; 26 MG/1; MG/1
1 TABLET, FILM COATED ORAL 2 TIMES DAILY
Qty: 112 TABLET | Refills: 0 | COMMUNITY
Start: 2020-11-30 | End: 2021-01-25

## 2020-11-30 RX ORDER — LORAZEPAM 1 MG/1
TABLET ORAL
COMMUNITY
Start: 2020-11-27 | End: 2020-12-28

## 2020-11-30 ASSESSMENT — PATIENT HEALTH QUESTIONNAIRE - PHQ9
SUM OF ALL RESPONSES TO PHQ QUESTIONS 1-9: 0
1. LITTLE INTEREST OR PLEASURE IN DOING THINGS: 0
SUM OF ALL RESPONSES TO PHQ9 QUESTIONS 1 & 2: 0
SUM OF ALL RESPONSES TO PHQ QUESTIONS 1-9: 0
SUM OF ALL RESPONSES TO PHQ QUESTIONS 1-9: 0
2. FEELING DOWN, DEPRESSED OR HOPELESS: 0

## 2020-11-30 NOTE — PROGRESS NOTES
Chief Complaint   Patient presents with    Dizziness       Patient presents for  evaluation of dizziness. I last saw the patient 5 months   ago. Patient is followed on a regular basis by Dr. Maggie Alarcon  Patient has not had any admissions to the hospital or change in medications since the last visit here. Pertinent past history reviewed and summarized below  He says he has had intermittent dizziness for the last 2 to 3 weeks  He said it is usually worse when he stands up  He has not passed out  He has no other symptoms with it  He does say that he is having hard stools difficulty having a bowel movement  He said it is not vertigo the room does not spin he just feels lightheaded  He has an ICD with EF 25%   He has permanent atrial fibrillation, on warfarin. He had CABG, MR repair 2008    Patient says 2 years ago he felt a jolt in his chest.  He said his arm started shaking. He laid down, symptoms went away. 3 days later he went and got his defibrillator checked. He had an episode of anti-tachycardia pacing for ventricular tachycardia. He has had no problems since. He feels fine  I started coreg and had him stop driving  He is back to driving. Interrogation of his ICD shows 13 beats of ventricular tachycardia in June    He was admitted in October 2017 with chest pain. Dr. Kris Marks did a cardiac catheterization which did show some CAD, not really amenable to intervention. Echo showed EF up to 50%. He is managed medically. He has done well  e is doing well. Denies weight gain. No orthopnea or PND.   He says he no longer sees Dr. Kris Marks  He was seeing Dr. Francisco New  He had battery replacement for his ICD on 4/22 with Dr. Francisco New      Patient Active Problem List   Diagnosis    CAD (coronary artery disease)    Aortic stenosis    [facility]) [facility] [facility]    Hyperlipidemia    Kaposi's sarcoma ([facility]    S/P CABG (coronary artery bypass graft)    Medtronic single ICD    GERD (gastroesophageal reflux disease)  Hyperuricemia    HTN (hypertension)    S/P MVR (mitral valve repair), Physio ring, 2008    Persistent atrial fibrillation (HCC)    Pacemaker    Dysphagia    Hoarseness    Laryngeal disorder    S/P AVR (aortic valve replacement)    Anticoagulated on Coumadin    Mild cognitive impairment    Chronic systolic CHF (congestive heart failure) (HCC)    ICD (implantable cardioverter-defibrillator) battery depletion       Current Outpatient Medications   Medication Sig Dispense Refill    LORazepam (ATIVAN) 1 MG tablet       sacubitril-valsartan (ENTRESTO) 24-26 MG per tablet Take 1 tablet by mouth 2 times daily for 112 doses 112 tablet 0    sacubitril-valsartan (ENTRESTO) 49-51 MG per tablet Take 1 tablet by mouth 2 times daily 112 tablet 0    warfarin (COUMADIN) 5 MG tablet Take as directed by [facility].  135 tablets = 90 days 135 tablet 3    furosemide (LASIX) 40 MG tablet TAKE 1 TABLET BY MOUTH DAILY 90 tablet 3    donepezil (ARICEPT) 5 MG tablet TAKE 1 TABLET BY MOUTH EVERY NIGHT 90 tablet 5    digoxin (DIGOX) 250 MCG tablet Take 1 tablet by mouth daily 90 tablet 3    carvedilol (COREG) 6.25 MG tablet Take 1 tablet by mouth 2 times daily 180 tablet 3    atorvastatin (LIPITOR) 20 MG tablet TAKE 1 TABLET BY MOUTH ONE TIME A DAY AT NIGHT 90 tablet 2    allopurinol (ZYLOPRIM) 100 MG tablet TAKE 1 TABLET BY MOUTH EVERY MORNING 90 tablet 3    Melatonin 2.5 MG CHEW Take 1 tablet by mouth nightly      Lidocaine-Methyl Sal-Camphor (VIVA EX) Apply topically nightly      loperamide (IMODIUM) 2 MG capsule Take 2 mg by mouth See Admin Instructions Indications: Diarrhea      Multiple Vitamins-Minerals (THERAPEUTIC MULTIVITAMIN-MINERALS) tablet Take 1 tablet by mouth 2 times daily Indications: Nutritional Support Noon and Supper      acetaminophen (TYLENOL) 325 MG tablet Take 650 mg by mouth every 6 hours as needed for Pain or Fever Indications: Pain Don't take more than 3,000 mg per day      Calcium Carbonate-Vitamin D (CALCIUM + D PO) Take 1 tablet by mouth 2 times daily Indications: Treatment to Prevent Vitamin Deficiency Noon and Supper      aspirin 81 MG EC tablet Take 81 mg by mouth nightly Indications: Anticoagulant Therapy With food. No current facility-administered medications for this visit. No Known Allergies    Review of Systems - General ROS: negative  Psychological ROS: negative  Hematological and Lymphatic ROS: No history of blood clots or bleeding disorder. Respiratory ROS:noo shortness of breath, or wheezing  Cardiovascular ROS: he does have dyspnea on exertion, usually about one block  Gastrointestinal ROS: no abdominal pain, change in bowel habits, or black or bloody stools  Genito-Urinary ROS: no dysuria, hesitancy, nocturia present  Musculoskeletal ROS:  negative  Neurological ROS: no TIA or stroke symptoms, dizziness as above  Dermatological ROS: negative    Blood pressure 125/64, pulse 92, temperature 97.5 °F (36.4 °C), height 5' 8\" (1.727 m), weight 181 lb (82.1 kg). Body mass index is 27.52 kg/m². Palpable systolic blood pressure in the 90s with the patient sitting  Physical Examination: General appearance - alert, well appearing, and in no distress  Mental status - alert, oriented to person, place, and time  Neck - Neck is supple, no JVD or carotid bruits. No thyromegaly or adenopathy.    Chest - clear to auscultation, no wheezes, rales or rhonchi, symmetric air entry  Heart - normal rate, regular rhythm, normal S1, S2, loud 3/6 systolic murmur over the entire precordium, no rubs, clicks or gallops  Abdomen - soft, nontender, nondistended, no masses or organomegaly  Neurological - alert, oriented, normal speech, no focal findings or movement disorder noted  Extremities - peripheral pulses normal, no pedal edema, no clubbing or cyanosis  Skin - normal coloration and turgor, no rashes, no suspicious skin lesions noted    Orders Placed This Encounter   Procedures  Basic Metabolic Panel     Standing Status:   Future     Number of Occurrences:   1     Standing Expiration Date:   11/30/2021    CBC     Standing Status:   Future     Number of Occurrences:   1     Standing Expiration Date:   11/30/2021       Outpatient Encounter Medications as of 11/30/2020   Medication Sig Dispense Refill    LORazepam (ATIVAN) 1 MG tablet       sacubitril-valsartan (ENTRESTO) 24-26 MG per tablet Take 1 tablet by mouth 2 times daily for 112 doses 112 tablet 0    sacubitril-valsartan (ENTRESTO) 49-51 MG per tablet Take 1 tablet by mouth 2 times daily 112 tablet 0    warfarin (COUMADIN) 5 MG tablet Take as directed by [facility].  135 tablets = 90 days 135 tablet 3    furosemide (LASIX) 40 MG tablet TAKE 1 TABLET BY MOUTH DAILY 90 tablet 3    donepezil (ARICEPT) 5 MG tablet TAKE 1 TABLET BY MOUTH EVERY NIGHT 90 tablet 5    digoxin (DIGOX) 250 MCG tablet Take 1 tablet by mouth daily 90 tablet 3    carvedilol (COREG) 6.25 MG tablet Take 1 tablet by mouth 2 times daily 180 tablet 3    atorvastatin (LIPITOR) 20 MG tablet TAKE 1 TABLET BY MOUTH ONE TIME A DAY AT NIGHT 90 tablet 2    allopurinol (ZYLOPRIM) 100 MG tablet TAKE 1 TABLET BY MOUTH EVERY MORNING 90 tablet 3    Melatonin 2.5 MG CHEW Take 1 tablet by mouth nightly      Lidocaine-Methyl Sal-Camphor (VIVA EX) Apply topically nightly      loperamide (IMODIUM) 2 MG capsule Take 2 mg by mouth See Admin Instructions Indications: Diarrhea      Multiple Vitamins-Minerals (THERAPEUTIC MULTIVITAMIN-MINERALS) tablet Take 1 tablet by mouth 2 times daily Indications: Nutritional Support Noon and Supper      acetaminophen (TYLENOL) 325 MG tablet Take 650 mg by mouth every 6 hours as needed for Pain or Fever Indications: Pain Don't take more than 3,000 mg per day      Calcium Carbonate-Vitamin D (CALCIUM + D PO) Take 1 tablet by mouth 2 times daily Indications: Treatment to Prevent Vitamin Deficiency Noon and Supper      aspirin 81 MG EC tablet Take 81 mg by mouth nightly Indications: Anticoagulant Therapy With food. No facility-administered encounter medications on file as of 11/30/2020. DIAGNOSES   Diagnosis Orders   1. Postural dizziness     2. Chronic systolic congestive heart failure (HCC)  Basic Metabolic Panel    CBC    sacubitril-valsartan (ENTRESTO) 24-26 MG per tablet   3. Essential hypertension     4. Permanent atrial fibrillation ([facility]     5. Anxiety     6. Hyperlipidemia, unspecified hyperlipidemia type     7. S/P CABG (coronary artery bypass graft)     8. S/P MVR (mitral valve repair), Physio ring, 2008     9. S/P AVR (aortic valve replacement)     10. Kaposi's sarcoma ([facility] [facility]     11. ICD (implantable cardioverter-defibrillator) in place     12.  Hypotension due to drugs         I suspect his dizziness may be related to hypotension  I am going to cut his Entresto in half  I will send him to the lab for a BMP and a CBC  If symptoms do not resolve son will get a hold of me    Patient has chronic congestive heart failure     Heart healthy , low salt diet essential    Daily weights with report of weight gain over 5 pounds    Take medications as prescribed    Avoid excess intake of fluids    Routine ICD checks

## 2020-11-30 NOTE — TELEPHONE ENCOUNTER
Reason for Disposition   Patient wants to be seen    Answer Assessment - Initial Assessment Questions  1. DESCRIPTION: \"Describe your dizziness. \"      Gets dizzy when changing position    2. LIGHTHEADED: \"Do you feel lightheaded? \" (e.g., somewhat faint, woozy, weak upon standing)      Denies    3. VERTIGO: \"Do you feel like either you or the room is spinning or tilting? \" (i.e. vertigo)      Denies    4. SEVERITY: \"How bad is it? \"  \"Do you feel like you are going to faint? \" \"Can you stand and walk? \"    - MILD - walking normally    - MODERATE - interferes with normal activities (e.g., work, school)     - SEVERE - unable to stand, requires support to walk, feels like passing out now. Mild    5. ONSET:  \"When did the dizziness begin? \"      3 weeks ago    6. AGGRAVATING FACTORS: \"Does anything make it worse? \" (e.g., standing, change in head position)      Changing postion    7. HEART RATE: \"Can you tell me your heart rate? \" \"How many beats in 15 seconds? \"  (Note: not all patients can do this)        Htn, pacemaker    8. CAUSE: \"What do you think is causing the dizziness? \"      Unsure    9. RECURRENT SYMPTOM: \"Have you had dizziness before? \" If so, ask: \"When was the last time? \" \"What happened that time? \"      Denies    10. OTHER SYMPTOMS: \"Do you have any other symptoms? \" (e.g., fever, chest pain, vomiting, diarrhea, bleeding)        Denies    11. PREGNANCY: \"Is there any chance you are pregnant? \" \"When was your last menstrual period? \"    Protocols used: [id number]    Patient called pre-service center [facility]) [facility] with red flag complaint. Brief description of triage: as above    Triage indicates for patient to be seen today having dizziness on/off for past 3 weeks mostly in afternoon and with position change    Care advice provided, patient verbalizes understanding; denies any other questions or concerns; instructed to call back for any new or worsening symptoms.     Writer provided warm transfer to Rose shaw [facility] for appointment scheduling. Attention Provider: Thank you for allowing me to participate in the care of your patient. The patient was connected to triage in response to information provided to the E[facility]. Please do not respond through this encounter as the response is not directed to a shared pool.

## 2020-12-22 ENCOUNTER — PROCEDURE VISIT (OUTPATIENT)
Dept: CARDIOLOGY CLINIC | Age: 85
End: 2020-12-22
Payer: MEDICARE

## 2020-12-22 PROCEDURE — 93297 REM INTERROG DEV EVAL ICPMS: CPT | Performed by: INTERNAL MEDICINE

## 2020-12-22 PROCEDURE — G2066 INTER DEVC REMOTE 30D: HCPCS | Performed by: INTERNAL MEDICINE

## 2020-12-28 RX ORDER — LORAZEPAM 1 MG/1
TABLET ORAL
Qty: 30 TABLET | Refills: 0 | Status: SHIPPED | OUTPATIENT
Start: 2020-12-28 | End: 2021-01-21 | Stop reason: SDUPTHER

## 2020-12-30 ENCOUNTER — HOSPITAL ENCOUNTER (OUTPATIENT)
Dept: PHARMACY | Age: 85
Setting detail: THERAPIES SERIES
Discharge: HOME | End: 2020-12-30
Payer: MEDICARE

## 2020-12-30 VITALS — TEMPERATURE: 96.6 F

## 2020-12-30 LAB — POC INR: 3.3 (ref 0.8–1.2)

## 2020-12-30 PROCEDURE — 36416 COLLJ CAPILLARY BLOOD SPEC: CPT

## 2020-12-30 PROCEDURE — 85610 PROTHROMBIN TIME: CPT

## 2020-12-30 PROCEDURE — 99211 OFF/OP EST MAY X REQ PHY/QHP: CPT

## 2020-12-30 NOTE — PROGRESS NOTES
Medication Management [address]  [phone] (phone)  [phone] (fax)      Mr. Elias Quiles is a 80 y.o.  male with history of persistent atrial fib. , per Dr. Marito Law referral, who presents today for Warfarin monitoring and adjustment (5 week visit). Patient verifies tablet strength. Daughter verified dose - uses pill box. Has had today's dose. No missed or extra doses. Patient denies bleeding/bruising/swelling/SOB/chest pain. Inner aspect of right eye bloodshot. No blood in urine or stool. No dietary changes. Changes in medication/OTC agents/herbals: Dr. Zach Madden decreased Judye Martino for dizziness 11/30. No change in alcohol use or tobacco use. No change in activity level. Patient denies headaches/falls. Has intermittent dizziness, but better. No vomiting or acute illness. Had intermittent diarrhea last week - took Imodium. Reminded why to avoid Pepto-Bismol. No procedures scheduled in the future at this time. Assessment:   Lab Results   Component Value Date    INR 3.30 (H) 12/30/2020    INR 2.10 (H) 11/25/2020    INR 2.20 (H) 10/28/2020     INR supratherapeutic - goal 2-3. Recent Labs     12/30/20  0920   INR 3.30*       Plan:  POCT INR ordered/performed/result reviewed. 2.5 mg tomorrow, Th, then continue PO  Coumadin 5 mg MWF, 7.5 mg TThSS. Recheck INR in 2 week(s). (Report given - orders entered by Beryle Hamburger, [facility] PharmD.) Patient reminded to call the Anticoagulation Clinic with any signs or symptoms of bleeding or with any medication changes. Patient given instructions utilizing the teach back method. Advised extra caution. Discharged ambulatory in no apparent distress, wearing mask, with daughter. After visit summary printed and reviewed with patient.       Medications reviewed and updated on home medication list.    Influenza vaccine:     [] given    [x] declined   [x] received previously   [] plans to receive at a later time [] refused    [x] documented in Epic

## 2021-01-01 ENCOUNTER — HOSPITAL ENCOUNTER (OUTPATIENT)
Dept: PHARMACY | Age: 86
Setting detail: THERAPIES SERIES
Discharge: HOME | End: 2021-10-20
Payer: MEDICARE

## 2021-01-01 ENCOUNTER — PROCEDURE VISIT (OUTPATIENT)
Dept: CARDIOLOGY CLINIC | Age: 86
End: 2021-01-01
Payer: MEDICARE

## 2021-01-01 ENCOUNTER — HOSPITAL ENCOUNTER (OUTPATIENT)
Dept: PHARMACY | Age: 86
Setting detail: THERAPIES SERIES
Discharge: HOME | End: 2021-11-17
Payer: MEDICARE

## 2021-01-01 ENCOUNTER — HOSPITAL ENCOUNTER (OUTPATIENT)
Dept: PHARMACY | Age: 86
Setting detail: THERAPIES SERIES
Discharge: HOME | End: 2021-12-27
Payer: MEDICARE

## 2021-01-01 ENCOUNTER — HOSPITAL ENCOUNTER (OUTPATIENT)
Dept: PHARMACY | Age: 86
Setting detail: THERAPIES SERIES
Discharge: HOME | End: 2021-12-08
Payer: MEDICARE

## 2021-01-01 ENCOUNTER — OFFICE VISIT (OUTPATIENT)
Dept: INTERNAL MEDICINE CLINIC | Age: 86
End: 2021-01-01
Payer: MEDICARE

## 2021-01-01 ENCOUNTER — HOSPITAL ENCOUNTER (INPATIENT)
Age: 86
LOS: 1 days | Discharge: HOME | DRG: 194 | End: 2021-12-20
Attending: EMERGENCY MEDICINE | Admitting: HOSPITALIST
Payer: MEDICARE

## 2021-01-01 ENCOUNTER — HOSPITAL ENCOUNTER (OUTPATIENT)
Dept: PHARMACY | Age: 86
Setting detail: THERAPIES SERIES
Discharge: HOME | End: 2021-11-03
Payer: MEDICARE

## 2021-01-01 ENCOUNTER — APPOINTMENT (OUTPATIENT)
Dept: PHARMACY | Age: 86
End: 2021-01-01
Payer: MEDICARE

## 2021-01-01 ENCOUNTER — TELEPHONE (OUTPATIENT)
Dept: PHARMACY | Age: 86
End: 2021-01-01

## 2021-01-01 ENCOUNTER — APPOINTMENT (OUTPATIENT)
Dept: CT IMAGING | Age: 86
DRG: 194 | End: 2021-01-01
Payer: MEDICARE

## 2021-01-01 ENCOUNTER — APPOINTMENT (OUTPATIENT)
Dept: GENERAL RADIOLOGY | Age: 86
DRG: 194 | End: 2021-01-01
Payer: MEDICARE

## 2021-01-01 VITALS
HEIGHT: 70 IN | HEART RATE: 104 BPM | DIASTOLIC BLOOD PRESSURE: 62 MMHG | WEIGHT: 174 LBS | SYSTOLIC BLOOD PRESSURE: 117 MMHG | BODY MASS INDEX: 24.91 KG/M2 | TEMPERATURE: 97.6 F

## 2021-01-01 VITALS
SYSTOLIC BLOOD PRESSURE: 110 MMHG | WEIGHT: 172.2 LBS | HEART RATE: 70 BPM | RESPIRATION RATE: 18 BRPM | BODY MASS INDEX: 24.65 KG/M2 | OXYGEN SATURATION: 97 % | TEMPERATURE: 97.7 F | HEIGHT: 70 IN | DIASTOLIC BLOOD PRESSURE: 61 MMHG

## 2021-01-01 DIAGNOSIS — Z51.81 ENCOUNTER FOR THERAPEUTIC DRUG MONITORING: ICD-10-CM

## 2021-01-01 DIAGNOSIS — I48.19 PERSISTENT ATRIAL FIBRILLATION (HCC): Primary | ICD-10-CM

## 2021-01-01 DIAGNOSIS — C46.9 KAPOSI'S SARCOMA (HCC): ICD-10-CM

## 2021-01-01 DIAGNOSIS — I48.21 PERMANENT ATRIAL FIBRILLATION (HCC): ICD-10-CM

## 2021-01-01 DIAGNOSIS — Z79.01 ANTICOAGULATED ON COUMADIN: ICD-10-CM

## 2021-01-01 DIAGNOSIS — J18.9 COMMUNITY ACQUIRED PNEUMONIA OF LEFT LUNG, UNSPECIFIED PART OF LUNG: ICD-10-CM

## 2021-01-01 DIAGNOSIS — J18.9 PNEUMONIA OF LEFT LUNG DUE TO INFECTIOUS ORGANISM, UNSPECIFIED PART OF LUNG: ICD-10-CM

## 2021-01-01 DIAGNOSIS — Z95.2 S/P AVR (AORTIC VALVE REPLACEMENT): ICD-10-CM

## 2021-01-01 DIAGNOSIS — R04.2 HEMOPTYSIS: ICD-10-CM

## 2021-01-01 DIAGNOSIS — Z95.810 ICD (IMPLANTABLE CARDIOVERTER-DEFIBRILLATOR) IN PLACE: ICD-10-CM

## 2021-01-01 DIAGNOSIS — J18.9 COMMUNITY ACQUIRED PNEUMONIA, UNSPECIFIED LATERALITY: Primary | ICD-10-CM

## 2021-01-01 DIAGNOSIS — E78.5 HYPERLIPIDEMIA, UNSPECIFIED HYPERLIPIDEMIA TYPE: ICD-10-CM

## 2021-01-01 DIAGNOSIS — I50.22 CHRONIC SYSTOLIC CONGESTIVE HEART FAILURE (HCC): ICD-10-CM

## 2021-01-01 DIAGNOSIS — Z95.1 S/P CABG (CORONARY ARTERY BYPASS GRAFT): ICD-10-CM

## 2021-01-01 DIAGNOSIS — Z79.01 LONG TERM (CURRENT) USE OF ANTICOAGULANTS: ICD-10-CM

## 2021-01-01 DIAGNOSIS — I50.22 CHRONIC SYSTOLIC CHF (CONGESTIVE HEART FAILURE) (HCC): Primary | ICD-10-CM

## 2021-01-01 DIAGNOSIS — Z95.810 AUTOMATIC IMPLANTABLE CARDIOVERTER-DEFIBRILLATOR IN SITU: Primary | ICD-10-CM

## 2021-01-01 DIAGNOSIS — I10 ESSENTIAL HYPERTENSION: ICD-10-CM

## 2021-01-01 DIAGNOSIS — R42 DIZZINESS: Primary | ICD-10-CM

## 2021-01-01 DIAGNOSIS — I48.21 PERMANENT ATRIAL FIBRILLATION (HCC): Primary | ICD-10-CM

## 2021-01-01 LAB
ALBUMIN SERPL-MCNC: 4 G/DL (ref 3.5–5.1)
ALP BLD-CCNC: 141 U/L (ref 38–126)
ALT SERPL-CCNC: 10 U/L (ref 11–66)
ANION GAP SERPL CALCULATED.3IONS-SCNC: 11 MEQ/L (ref 8–16)
ANION GAP SERPL CALCULATED.3IONS-SCNC: 11 MEQ/L (ref 8–16)
AST SERPL-CCNC: 17 U/L (ref 5–40)
BACTERIA: ABNORMAL /HPF
BASOPHILS # BLD: 0.2 %
BASOPHILS # BLD: 0.4 %
BASOPHILS ABSOLUTE: 0 THOU/MM3 (ref 0–0.1)
BASOPHILS ABSOLUTE: 0 THOU/MM3 (ref 0–0.1)
BILIRUB SERPL-MCNC: 2.5 MG/DL (ref 0.3–1.2)
BILIRUBIN URINE: NEGATIVE
BLOOD CULTURE, ROUTINE: NORMAL
BLOOD CULTURE, ROUTINE: NORMAL
BLOOD, URINE: NEGATIVE
BUN BLDV-MCNC: 29 MG/DL (ref 7–22)
BUN BLDV-MCNC: 30 MG/DL (ref 7–22)
CALCIUM SERPL-MCNC: 9.1 MG/DL (ref 8.5–10.5)
CALCIUM SERPL-MCNC: 9.3 MG/DL (ref 8.5–10.5)
CASTS 2: ABNORMAL /LPF
CASTS UA: ABNORMAL /LPF
CHARACTER, URINE: CLEAR
CHLORIDE BLD-SCNC: 104 MEQ/L (ref 98–111)
CHLORIDE BLD-SCNC: 110 MEQ/L (ref 98–111)
CO2: 25 MEQ/L (ref 23–33)
CO2: 27 MEQ/L (ref 23–33)
COLOR: YELLOW
CREAT SERPL-MCNC: 1 MG/DL (ref 0.4–1.2)
CREAT SERPL-MCNC: 1.3 MG/DL (ref 0.4–1.2)
CRYSTALS, UA: ABNORMAL
DIGOXIN LEVEL: 1.7 NG/ML (ref 0.5–2)
EKG ATRIAL RATE: 46 BPM
EKG ATRIAL RATE: 80 BPM
EKG Q-T INTERVAL: 480 MS
EKG Q-T INTERVAL: 480 MS
EKG QRS DURATION: 210 MS
EKG QRS DURATION: 216 MS
EKG QTC CALCULATION (BAZETT): 528 MS
EKG QTC CALCULATION (BAZETT): 532 MS
EKG R AXIS: -81 DEGREES
EKG R AXIS: -87 DEGREES
EKG T AXIS: 95 DEGREES
EKG T AXIS: 97 DEGREES
EKG VENTRICULAR RATE: 73 BPM
EKG VENTRICULAR RATE: 74 BPM
EOSINOPHIL # BLD: 0 %
EOSINOPHIL # BLD: 1.2 %
EOSINOPHILS ABSOLUTE: 0 THOU/MM3 (ref 0–0.4)
EOSINOPHILS ABSOLUTE: 0.1 THOU/MM3 (ref 0–0.4)
EPITHELIAL CELLS, UA: ABNORMAL /HPF
ERYTHROCYTE [DISTWIDTH] IN BLOOD BY AUTOMATED COUNT: 17.2 % (ref 11.5–14.5)
ERYTHROCYTE [DISTWIDTH] IN BLOOD BY AUTOMATED COUNT: 17.2 % (ref 11.5–14.5)
ERYTHROCYTE [DISTWIDTH] IN BLOOD BY AUTOMATED COUNT: 53.2 FL (ref 35–45)
ERYTHROCYTE [DISTWIDTH] IN BLOOD BY AUTOMATED COUNT: 53.7 FL (ref 35–45)
GFR SERPL CREATININE-BSD FRML MDRD: 52 ML/MIN/1.73M2
GFR SERPL CREATININE-BSD FRML MDRD: 70 ML/MIN/1.73M2
GLUCOSE BLD-MCNC: 138 MG/DL (ref 70–108)
GLUCOSE BLD-MCNC: 89 MG/DL (ref 70–108)
GLUCOSE URINE: NEGATIVE MG/DL
HCT VFR BLD CALC: 30.1 % (ref 42–52)
HCT VFR BLD CALC: 33.4 % (ref 42–52)
HEMOCCULT STL QL: NEGATIVE
HEMOGLOBIN: 10.7 GM/DL (ref 14–18)
HEMOGLOBIN: 9.7 GM/DL (ref 14–18)
IMMATURE GRANS (ABS): 0.04 THOU/MM3 (ref 0–0.07)
IMMATURE GRANS (ABS): 0.07 THOU/MM3 (ref 0–0.07)
IMMATURE GRANULOCYTES: 0.6 %
IMMATURE GRANULOCYTES: 0.6 %
INR BLD: 2.73 (ref 0.85–1.13)
INR BLD: 2.9 (ref 0.85–1.13)
KETONES, URINE: NEGATIVE
LACTIC ACID, SEPSIS: 1.6 MMOL/L (ref 0.5–1.9)
LEUKOCYTE ESTERASE, URINE: NEGATIVE
LYMPHOCYTES # BLD: 10.3 %
LYMPHOCYTES # BLD: 6.9 %
LYMPHOCYTES ABSOLUTE: 0.7 THOU/MM3 (ref 1–4.8)
LYMPHOCYTES ABSOLUTE: 0.8 THOU/MM3 (ref 1–4.8)
MCH RBC QN AUTO: 27.4 PG (ref 26–33)
MCH RBC QN AUTO: 27.4 PG (ref 26–33)
MCHC RBC AUTO-ENTMCNC: 32 GM/DL (ref 32.2–35.5)
MCHC RBC AUTO-ENTMCNC: 32.2 GM/DL (ref 32.2–35.5)
MCV RBC AUTO: 85 FL (ref 80–94)
MCV RBC AUTO: 85.6 FL (ref 80–94)
MISCELLANEOUS 2: ABNORMAL
MONOCYTES # BLD: 5.4 %
MONOCYTES # BLD: 8.7 %
MONOCYTES ABSOLUTE: 0.6 THOU/MM3 (ref 0.4–1.3)
MONOCYTES ABSOLUTE: 0.6 THOU/MM3 (ref 0.4–1.3)
NITRITE, URINE: NEGATIVE
NUCLEATED RED BLOOD CELLS: 0 /100 WBC
NUCLEATED RED BLOOD CELLS: 0 /100 WBC
OSMOLALITY CALCULATION: 291.5 MOSMOL/KG (ref 275–300)
OSMOLALITY CALCULATION: 295.9 MOSMOL/KG (ref 275–300)
PH UA: 5 (ref 5–9)
PLATELET # BLD: 157 THOU/MM3 (ref 130–400)
PLATELET # BLD: 165 THOU/MM3 (ref 130–400)
PMV BLD AUTO: 10.3 FL (ref 9.4–12.4)
PMV BLD AUTO: 10.9 FL (ref 9.4–12.4)
POC INR: 1.7 (ref 0.8–1.2)
POC INR: 1.8 (ref 0.8–1.2)
POC INR: 2.3 (ref 0.8–1.2)
POC INR: 2.4 (ref 0.8–1.2)
POC INR: 3.3 (ref 0.8–1.2)
POTASSIUM REFLEX MAGNESIUM: 3.8 MEQ/L (ref 3.5–5.2)
POTASSIUM SERPL-SCNC: 3.7 MEQ/L (ref 3.5–5.2)
PROTEIN UA: 30
RBC # BLD: 3.54 MILL/MM3 (ref 4.7–6.1)
RBC # BLD: 3.9 MILL/MM3 (ref 4.7–6.1)
RBC URINE: ABNORMAL /HPF
RENAL EPITHELIAL, UA: ABNORMAL
SARS-COV-2, NAAT: NOT DETECTED
SEG NEUTROPHILS: 78.8 %
SEG NEUTROPHILS: 86.9 %
SEGMENTED NEUTROPHILS ABSOLUTE COUNT: 5.3 THOU/MM3 (ref 1.8–7.7)
SEGMENTED NEUTROPHILS ABSOLUTE COUNT: 9.8 THOU/MM3 (ref 1.8–7.7)
SODIUM BLD-SCNC: 142 MEQ/L (ref 135–145)
SODIUM BLD-SCNC: 146 MEQ/L (ref 135–145)
SPECIFIC GRAVITY, URINE: > 1.03 (ref 1–1.03)
TOTAL PROTEIN: 7 G/DL (ref 6.1–8)
TROPONIN T: 0.02 NG/ML
TROPONIN T: 0.02 NG/ML
TSH SERPL DL<=0.05 MIU/L-ACNC: 3.53 UIU/ML (ref 0.4–4.2)
UROBILINOGEN, URINE: 0.2 EU/DL (ref 0–1)
WBC # BLD: 11.3 THOU/MM3 (ref 4.8–10.8)
WBC # BLD: 6.7 THOU/MM3 (ref 4.8–10.8)
WBC UA: ABNORMAL /HPF
YEAST: ABNORMAL

## 2021-01-01 PROCEDURE — 93297 REM INTERROG DEV EVAL ICPMS: CPT | Performed by: INTERNAL MEDICINE

## 2021-01-01 PROCEDURE — 2580000003 HC RX 258: Performed by: STUDENT IN AN ORGANIZED HEALTH CARE EDUCATION/TRAINING PROGRAM

## 2021-01-01 PROCEDURE — 36416 COLLJ CAPILLARY BLOOD SPEC: CPT

## 2021-01-01 PROCEDURE — 85025 COMPLETE CBC W/AUTO DIFF WBC: CPT

## 2021-01-01 PROCEDURE — 99211 OFF/OP EST MAY X REQ PHY/QHP: CPT

## 2021-01-01 PROCEDURE — 80162 ASSAY OF DIGOXIN TOTAL: CPT

## 2021-01-01 PROCEDURE — 93296 REM INTERROG EVL PM/IDS: CPT | Performed by: INTERNAL MEDICINE

## 2021-01-01 PROCEDURE — 83605 ASSAY OF LACTIC ACID: CPT

## 2021-01-01 PROCEDURE — 87635 SARS-COV-2 COVID-19 AMP PRB: CPT

## 2021-01-01 PROCEDURE — 85610 PROTHROMBIN TIME: CPT

## 2021-01-01 PROCEDURE — 6360000004 HC RX CONTRAST MEDICATION: Performed by: STUDENT IN AN ORGANIZED HEALTH CARE EDUCATION/TRAINING PROGRAM

## 2021-01-01 PROCEDURE — 93005 ELECTROCARDIOGRAM TRACING: CPT | Performed by: HOSPITALIST

## 2021-01-01 PROCEDURE — 6370000000 HC RX 637 (ALT 250 FOR IP): Performed by: HOSPITALIST

## 2021-01-01 PROCEDURE — 6360000002 HC RX W HCPCS: Performed by: STUDENT IN AN ORGANIZED HEALTH CARE EDUCATION/TRAINING PROGRAM

## 2021-01-01 PROCEDURE — G2066 INTER DEVC REMOTE 30D: HCPCS | Performed by: INTERNAL MEDICINE

## 2021-01-01 PROCEDURE — 99495 TRANSJ CARE MGMT MOD F2F 14D: CPT | Performed by: INTERNAL MEDICINE

## 2021-01-01 PROCEDURE — 93005 ELECTROCARDIOGRAM TRACING: CPT | Performed by: STUDENT IN AN ORGANIZED HEALTH CARE EDUCATION/TRAINING PROGRAM

## 2021-01-01 PROCEDURE — 84484 ASSAY OF TROPONIN QUANT: CPT

## 2021-01-01 PROCEDURE — 71275 CT ANGIOGRAPHY CHEST: CPT

## 2021-01-01 PROCEDURE — 99239 HOSP IP/OBS DSCHRG MGMT >30: CPT | Performed by: INTERNAL MEDICINE

## 2021-01-01 PROCEDURE — 80053 COMPREHEN METABOLIC PANEL: CPT

## 2021-01-01 PROCEDURE — 87040 BLOOD CULTURE FOR BACTERIA: CPT

## 2021-01-01 PROCEDURE — 93295 DEV INTERROG REMOTE 1/2/MLT: CPT | Performed by: INTERNAL MEDICINE

## 2021-01-01 PROCEDURE — 36415 COLL VENOUS BLD VENIPUNCTURE: CPT

## 2021-01-01 PROCEDURE — 1111F DSCHRG MED/CURRENT MED MERGE: CPT | Performed by: INTERNAL MEDICINE

## 2021-01-01 PROCEDURE — 99283 EMERGENCY DEPT VISIT LOW MDM: CPT

## 2021-01-01 PROCEDURE — 71045 X-RAY EXAM CHEST 1 VIEW: CPT

## 2021-01-01 PROCEDURE — 2580000003 HC RX 258: Performed by: HOSPITALIST

## 2021-01-01 PROCEDURE — 80048 BASIC METABOLIC PNL TOTAL CA: CPT

## 2021-01-01 PROCEDURE — 82272 OCCULT BLD FECES 1-3 TESTS: CPT

## 2021-01-01 PROCEDURE — 1200000003 HC TELEMETRY R&B

## 2021-01-01 PROCEDURE — 74177 CT ABD & PELVIS W/CONTRAST: CPT

## 2021-01-01 PROCEDURE — 84443 ASSAY THYROID STIM HORMONE: CPT

## 2021-01-01 PROCEDURE — 99223 1ST HOSP IP/OBS HIGH 75: CPT | Performed by: INTERNAL MEDICINE

## 2021-01-01 PROCEDURE — 81001 URINALYSIS AUTO W/SCOPE: CPT

## 2021-01-01 PROCEDURE — 99223 1ST HOSP IP/OBS HIGH 75: CPT | Performed by: HOSPITALIST

## 2021-01-01 RX ORDER — AZITHROMYCIN 250 MG/1
250 TABLET, FILM COATED ORAL DAILY
Qty: 4 TABLET | Refills: 0 | Status: SHIPPED | OUTPATIENT
Start: 2021-01-01 | End: 2021-01-01

## 2021-01-01 RX ORDER — SODIUM CHLORIDE 0.9 % (FLUSH) 0.9 %
5-40 SYRINGE (ML) INJECTION EVERY 12 HOURS SCHEDULED
Status: DISCONTINUED | OUTPATIENT
Start: 2021-01-01 | End: 2021-01-01 | Stop reason: HOSPADM

## 2021-01-01 RX ORDER — ATORVASTATIN CALCIUM 20 MG/1
20 TABLET, FILM COATED ORAL NIGHTLY
Status: DISCONTINUED | OUTPATIENT
Start: 2021-01-01 | End: 2021-01-01 | Stop reason: HOSPADM

## 2021-01-01 RX ORDER — ACETAMINOPHEN 650 MG/1
650 SUPPOSITORY RECTAL EVERY 6 HOURS PRN
Status: DISCONTINUED | OUTPATIENT
Start: 2021-01-01 | End: 2021-01-01 | Stop reason: HOSPADM

## 2021-01-01 RX ORDER — SODIUM CHLORIDE 9 MG/ML
25 INJECTION, SOLUTION INTRAVENOUS PRN
Status: DISCONTINUED | OUTPATIENT
Start: 2021-01-01 | End: 2021-01-01 | Stop reason: HOSPADM

## 2021-01-01 RX ORDER — ONDANSETRON 2 MG/ML
4 INJECTION INTRAMUSCULAR; INTRAVENOUS EVERY 6 HOURS PRN
Status: DISCONTINUED | OUTPATIENT
Start: 2021-01-01 | End: 2021-01-01 | Stop reason: HOSPADM

## 2021-01-01 RX ORDER — POLYETHYLENE GLYCOL 3350 17 G/17G
17 POWDER, FOR SOLUTION ORAL DAILY PRN
Status: DISCONTINUED | OUTPATIENT
Start: 2021-01-01 | End: 2021-01-01 | Stop reason: HOSPADM

## 2021-01-01 RX ORDER — DIGOXIN 250 MCG
250 TABLET ORAL DAILY
Status: DISCONTINUED | OUTPATIENT
Start: 2021-01-01 | End: 2021-01-01 | Stop reason: HOSPADM

## 2021-01-01 RX ORDER — LORAZEPAM 1 MG/1
TABLET ORAL NIGHTLY
COMMUNITY
Start: 2021-01-01 | End: 2022-01-01 | Stop reason: SDUPTHER

## 2021-01-01 RX ORDER — LORAZEPAM 1 MG/1
1 TABLET ORAL NIGHTLY
Status: DISCONTINUED | OUTPATIENT
Start: 2021-01-01 | End: 2021-01-01 | Stop reason: HOSPADM

## 2021-01-01 RX ORDER — SODIUM CHLORIDE 0.9 % (FLUSH) 0.9 %
5-40 SYRINGE (ML) INJECTION PRN
Status: DISCONTINUED | OUTPATIENT
Start: 2021-01-01 | End: 2021-01-01 | Stop reason: HOSPADM

## 2021-01-01 RX ORDER — CARVEDILOL 6.25 MG/1
6.25 TABLET ORAL 2 TIMES DAILY
Status: DISCONTINUED | OUTPATIENT
Start: 2021-01-01 | End: 2021-01-01 | Stop reason: HOSPADM

## 2021-01-01 RX ORDER — LANOLIN ALCOHOL/MO/W.PET/CERES
3 CREAM (GRAM) TOPICAL NIGHTLY
Status: DISCONTINUED | OUTPATIENT
Start: 2021-01-01 | End: 2021-01-01 | Stop reason: HOSPADM

## 2021-01-01 RX ORDER — ACETAMINOPHEN 325 MG/1
650 TABLET ORAL EVERY 6 HOURS PRN
Status: DISCONTINUED | OUTPATIENT
Start: 2021-01-01 | End: 2021-01-01 | Stop reason: SDUPTHER

## 2021-01-01 RX ORDER — WARFARIN SODIUM 5 MG/1
TABLET ORAL
Qty: 135 TABLET | Refills: 3 | Status: ON HOLD | OUTPATIENT
Start: 2021-01-01 | End: 2022-01-01 | Stop reason: HOSPADM

## 2021-01-01 RX ORDER — AMOXICILLIN AND CLAVULANATE POTASSIUM 875; 125 MG/1; MG/1
1 TABLET, FILM COATED ORAL 2 TIMES DAILY
Qty: 14 TABLET | Refills: 0 | Status: SHIPPED | OUTPATIENT
Start: 2021-01-01 | End: 2021-01-01

## 2021-01-01 RX ORDER — ALLOPURINOL 100 MG/1
100 TABLET ORAL DAILY
Status: DISCONTINUED | OUTPATIENT
Start: 2021-01-01 | End: 2021-01-01 | Stop reason: HOSPADM

## 2021-01-01 RX ORDER — DONEPEZIL HYDROCHLORIDE 5 MG/1
5 TABLET, FILM COATED ORAL NIGHTLY
Status: DISCONTINUED | OUTPATIENT
Start: 2021-01-01 | End: 2021-01-01 | Stop reason: HOSPADM

## 2021-01-01 RX ORDER — MULTIVITAMIN WITH IRON
1 TABLET ORAL 2 TIMES DAILY
Status: DISCONTINUED | OUTPATIENT
Start: 2021-01-01 | End: 2021-01-01 | Stop reason: HOSPADM

## 2021-01-01 RX ORDER — ONDANSETRON 4 MG/1
4 TABLET, ORALLY DISINTEGRATING ORAL EVERY 8 HOURS PRN
Status: DISCONTINUED | OUTPATIENT
Start: 2021-01-01 | End: 2021-01-01 | Stop reason: HOSPADM

## 2021-01-01 RX ORDER — 0.9 % SODIUM CHLORIDE 0.9 %
1000 INTRAVENOUS SOLUTION INTRAVENOUS ONCE
Status: COMPLETED | OUTPATIENT
Start: 2021-01-01 | End: 2021-01-01

## 2021-01-01 RX ORDER — ACETAMINOPHEN 325 MG/1
650 TABLET ORAL EVERY 6 HOURS PRN
Status: DISCONTINUED | OUTPATIENT
Start: 2021-01-01 | End: 2021-01-01 | Stop reason: HOSPADM

## 2021-01-01 RX ORDER — ASPIRIN 81 MG/1
81 TABLET ORAL NIGHTLY
Qty: 30 TABLET | Refills: 3 | Status: ON HOLD | OUTPATIENT
Start: 2021-01-01 | End: 2022-01-01 | Stop reason: HOSPADM

## 2021-01-01 RX ORDER — ASPIRIN 81 MG/1
81 TABLET ORAL NIGHTLY
Status: DISCONTINUED | OUTPATIENT
Start: 2021-01-01 | End: 2021-01-01 | Stop reason: HOSPADM

## 2021-01-01 RX ORDER — ALBUTEROL SULFATE 2.5 MG/3ML
5 SOLUTION RESPIRATORY (INHALATION) EVERY 4 HOURS PRN
Status: DISCONTINUED | OUTPATIENT
Start: 2021-01-01 | End: 2021-01-01 | Stop reason: HOSPADM

## 2021-01-01 RX ADMIN — SACUBITRIL AND VALSARTAN 1 TABLET: 24; 26 TABLET, FILM COATED ORAL at 21:32

## 2021-01-01 RX ADMIN — DIGOXIN 250 MCG: 250 TABLET ORAL at 08:19

## 2021-01-01 RX ADMIN — SODIUM CHLORIDE 1000 ML: 9 INJECTION, SOLUTION INTRAVENOUS at 10:00

## 2021-01-01 RX ADMIN — AZITHROMYCIN DIHYDRATE 500 MG: 500 INJECTION, POWDER, LYOPHILIZED, FOR SOLUTION INTRAVENOUS at 17:30

## 2021-01-01 RX ADMIN — DONEPEZIL HYDROCHLORIDE 5 MG: 5 TABLET, FILM COATED ORAL at 21:32

## 2021-01-01 RX ADMIN — AZITHROMYCIN DIHYDRATE 500 MG: 500 INJECTION, POWDER, LYOPHILIZED, FOR SOLUTION INTRAVENOUS at 13:15

## 2021-01-01 RX ADMIN — Medication 1 TABLET: at 21:32

## 2021-01-01 RX ADMIN — IOPAMIDOL 80 ML: 755 INJECTION, SOLUTION INTRAVENOUS at 12:03

## 2021-01-01 RX ADMIN — ATORVASTATIN CALCIUM 20 MG: 20 TABLET, FILM COATED ORAL at 21:32

## 2021-01-01 RX ADMIN — SODIUM CHLORIDE, PRESERVATIVE FREE 10 ML: 5 INJECTION INTRAVENOUS at 21:32

## 2021-01-01 RX ADMIN — CARVEDILOL 6.25 MG: 6.25 TABLET, FILM COATED ORAL at 21:32

## 2021-01-01 RX ADMIN — CARVEDILOL 6.25 MG: 6.25 TABLET, FILM COATED ORAL at 08:20

## 2021-01-01 RX ADMIN — SACUBITRIL AND VALSARTAN 1 TABLET: 24; 26 TABLET, FILM COATED ORAL at 08:18

## 2021-01-01 RX ADMIN — CEFTRIAXONE SODIUM 1000 MG: 1 INJECTION, POWDER, FOR SOLUTION INTRAMUSCULAR; INTRAVENOUS at 15:52

## 2021-01-01 RX ADMIN — LORAZEPAM 1 MG: 1 TABLET ORAL at 21:34

## 2021-01-01 RX ADMIN — SODIUM CHLORIDE, PRESERVATIVE FREE 10 ML: 5 INJECTION INTRAVENOUS at 08:20

## 2021-01-01 RX ADMIN — Medication 3 MG: at 21:32

## 2021-01-01 RX ADMIN — Medication 1 TABLET: at 08:19

## 2021-01-01 RX ADMIN — ALLOPURINOL 100 MG: 100 TABLET ORAL at 08:19

## 2021-01-01 RX ADMIN — CEFTRIAXONE SODIUM 2000 MG: 2 INJECTION, POWDER, FOR SOLUTION INTRAMUSCULAR; INTRAVENOUS at 12:44

## 2021-01-01 ASSESSMENT — ENCOUNTER SYMPTOMS
CONSTIPATION: 0
NAUSEA: 0
CHEST TIGHTNESS: 0
SINUS PRESSURE: 0
DIARRHEA: 0
ABDOMINAL DISTENTION: 0
COLOR CHANGE: 0
ABDOMINAL PAIN: 1
EYE PAIN: 0
VOMITING: 0
RHINORRHEA: 1
EYE DISCHARGE: 0
SINUS PAIN: 0
SHORTNESS OF BREATH: 0
BLOOD IN STOOL: 1
EYE REDNESS: 0
EYE ITCHING: 0

## 2021-01-01 ASSESSMENT — PAIN SCALES - GENERAL
PAINLEVEL_OUTOF10: 0
PAINLEVEL_OUTOF10: 0
PAINLEVEL_OUTOF10: 5
PAINLEVEL_OUTOF10: 0

## 2021-01-13 ENCOUNTER — HOSPITAL ENCOUNTER (OUTPATIENT)
Dept: PHARMACY | Age: 86
Setting detail: THERAPIES SERIES
Discharge: HOME | End: 2021-01-13
Payer: MEDICARE

## 2021-01-13 VITALS — TEMPERATURE: 98 F

## 2021-01-13 DIAGNOSIS — I48.19 PERSISTENT ATRIAL FIBRILLATION (HCC): ICD-10-CM

## 2021-01-13 LAB — POC INR: 2.4 (ref 0.8–1.2)

## 2021-01-13 PROCEDURE — 36416 COLLJ CAPILLARY BLOOD SPEC: CPT

## 2021-01-13 PROCEDURE — 85610 PROTHROMBIN TIME: CPT

## 2021-01-13 PROCEDURE — 99211 OFF/OP EST MAY X REQ PHY/QHP: CPT

## 2021-01-13 NOTE — PROGRESS NOTES
Medication Management [address]  [phone] (phone)  [phone] (fax)      Mr. Surekha Upton is a 80 y.o.  male with history of Afib who presents today for anticoagulation monitoring and adjustment. Patient verifies current dosing regimen and tablet strength. No missed or extra doses. Patient denies s/s bleeding/bruising/swelling/SOB/chest pain  No blood in urine or stool. No dietary changes. No changes in medication/OTC agents/Herbals. No change in alcohol use or tobacco use. No change in activity level. Patient denies headaches/dizziness/lightheadedness/falls. No vomiting or acute illness. Patient had some diarrhea about a month ago, but has not had any issues lately. No Procedures scheduled in the future at this time. Assessment:   Lab Results   Component Value Date    INR 2.40 (H) 01/13/2021    INR 3.30 (H) 12/30/2020    INR 2.10 (H) 11/25/2020     INR therapeutic   Recent Labs     01/13/21  0932   INR 2.40*     Patient interview completed and discussed with pharmacist by Cesar Kimbrough, PharmD Candidate [id number]. Patient has been stable on current regimen since August 2020 and has had only one supratherapeutic INR since that time. Plan:  Continue Coumadin 5 mg MWF and 7.5 mg TuThSaSu. Recheck INR in 4 week(s). Patient reminded to call the Anticoagulation Clinic with any signs or symptoms of bleeding or with any medication changes. Patient given instructions utilizing the teach back method. Discharged ambulatory in no apparent distress. After visit summary printed and reviewed with patient.       Medications reviewed and updated on home medication list Yes    Influenza vaccine:     [] given    [x] declined   [x] received previously   [] plans to receive at a later time   [] refused    [x] documented in [address]: No Total # of Interventions Recommended: 0  - Maintenance Safety Lab Monitoring #: 1  Total Interventions Accepted: 0  Time Spent (min): [address], PharmD, BCPS  1/13/2021  9:43 AM

## 2021-01-21 ENCOUNTER — OFFICE VISIT (OUTPATIENT)
Dept: INTERNAL MEDICINE CLINIC | Age: 86
End: 2021-01-21
Payer: MEDICARE

## 2021-01-21 VITALS
SYSTOLIC BLOOD PRESSURE: 126 MMHG | HEART RATE: 73 BPM | HEIGHT: 68 IN | BODY MASS INDEX: 27.43 KG/M2 | WEIGHT: 181 LBS | DIASTOLIC BLOOD PRESSURE: 72 MMHG

## 2021-01-21 DIAGNOSIS — I48.21 PERMANENT ATRIAL FIBRILLATION (HCC): ICD-10-CM

## 2021-01-21 DIAGNOSIS — E78.5 HYPERLIPIDEMIA, UNSPECIFIED HYPERLIPIDEMIA TYPE: ICD-10-CM

## 2021-01-21 DIAGNOSIS — Z95.810 ICD (IMPLANTABLE CARDIOVERTER-DEFIBRILLATOR) IN PLACE: ICD-10-CM

## 2021-01-21 DIAGNOSIS — Z98.890 S/P MVR (MITRAL VALVE REPAIR): ICD-10-CM

## 2021-01-21 DIAGNOSIS — Z79.899 MEDICATION MANAGEMENT: Primary | ICD-10-CM

## 2021-01-21 DIAGNOSIS — G31.84 MILD COGNITIVE IMPAIRMENT: ICD-10-CM

## 2021-01-21 DIAGNOSIS — C46.9 KAPOSI'S SARCOMA (HCC): ICD-10-CM

## 2021-01-21 DIAGNOSIS — Z95.1 S/P CABG (CORONARY ARTERY BYPASS GRAFT): ICD-10-CM

## 2021-01-21 DIAGNOSIS — Z87.39 H/O: GOUT: ICD-10-CM

## 2021-01-21 DIAGNOSIS — I50.22 CHRONIC SYSTOLIC CONGESTIVE HEART FAILURE (HCC): ICD-10-CM

## 2021-01-21 DIAGNOSIS — I10 ESSENTIAL HYPERTENSION: ICD-10-CM

## 2021-01-21 DIAGNOSIS — F41.9 ANXIETY: ICD-10-CM

## 2021-01-21 DIAGNOSIS — Z95.2 S/P AVR (AORTIC VALVE REPLACEMENT): ICD-10-CM

## 2021-01-21 PROCEDURE — 4040F PNEUMOC VAC/ADMIN/RCVD: CPT | Performed by: INTERNAL MEDICINE

## 2021-01-21 PROCEDURE — G8428 CUR MEDS NOT DOCUMENT: HCPCS | Performed by: INTERNAL MEDICINE

## 2021-01-21 PROCEDURE — G8484 FLU IMMUNIZE NO ADMIN: HCPCS | Performed by: INTERNAL MEDICINE

## 2021-01-21 PROCEDURE — 99213 OFFICE O/P EST LOW 20 MIN: CPT | Performed by: INTERNAL MEDICINE

## 2021-01-21 PROCEDURE — 1036F TOBACCO NON-USER: CPT | Performed by: INTERNAL MEDICINE

## 2021-01-21 PROCEDURE — G8417 CALC BMI ABV UP PARAM F/U: HCPCS | Performed by: INTERNAL MEDICINE

## 2021-01-21 PROCEDURE — 1123F ACP DISCUSS/DSCN MKR DOCD: CPT | Performed by: INTERNAL MEDICINE

## 2021-01-21 RX ORDER — DIGOXIN 250 MCG
250 TABLET ORAL DAILY
Qty: 90 TABLET | Refills: 3 | Status: SHIPPED | OUTPATIENT
Start: 2021-01-21 | End: 2021-08-19 | Stop reason: SDUPTHER

## 2021-01-21 RX ORDER — ALLOPURINOL 100 MG/1
TABLET ORAL
Qty: 90 TABLET | Refills: 3 | Status: SHIPPED | OUTPATIENT
Start: 2021-01-21 | End: 2021-08-19 | Stop reason: SDUPTHER

## 2021-01-21 RX ORDER — CARVEDILOL 6.25 MG/1
6.25 TABLET ORAL 2 TIMES DAILY
Qty: 180 TABLET | Refills: 3 | Status: SHIPPED | OUTPATIENT
Start: 2021-01-21 | End: 2021-08-19 | Stop reason: SDUPTHER

## 2021-01-21 RX ORDER — FUROSEMIDE 40 MG/1
TABLET ORAL
Qty: 90 TABLET | Refills: 3 | Status: SHIPPED | OUTPATIENT
Start: 2021-01-21 | End: 2021-08-19 | Stop reason: SDUPTHER

## 2021-01-21 RX ORDER — ATORVASTATIN CALCIUM 20 MG/1
TABLET, FILM COATED ORAL
Qty: 90 TABLET | Refills: 2 | Status: SHIPPED | OUTPATIENT
Start: 2021-01-21 | End: 2021-08-19 | Stop reason: SDUPTHER

## 2021-01-21 RX ORDER — WARFARIN SODIUM 5 MG/1
TABLET ORAL
Qty: 135 TABLET | Refills: 3 | Status: SHIPPED | OUTPATIENT
Start: 2021-01-21 | End: 2021-08-19 | Stop reason: SDUPTHER

## 2021-01-21 RX ORDER — LORAZEPAM 1 MG/1
TABLET ORAL
Qty: 30 TABLET | Refills: 0 | Status: SHIPPED | OUTPATIENT
Start: 2021-01-21 | End: 2021-02-21

## 2021-01-21 RX ORDER — DONEPEZIL HYDROCHLORIDE 5 MG/1
TABLET, FILM COATED ORAL
Qty: 90 TABLET | Refills: 5 | Status: SHIPPED | OUTPATIENT
Start: 2021-01-21 | End: 2021-08-19 | Stop reason: SDUPTHER

## 2021-01-21 NOTE — PROGRESS NOTES
Chief Complaint   Patient presents with    Atrial Fibrillation    Hypertension    Hyperlipidemia       Patient presents for  evaluation of dizziness. I last saw the patient 6 weeks   ago. Patient is followed on a regular basis by Dr. Gideon Pham  Patient has not had any admissions to the hospital or change in medications since the last visit here. Pertinent past history reviewed and summarized below  He says he  had intermittent dizziness for the last 2 to 3 weeks  He said it is usually worse when he stands up  He has not passed out  He has no other symptoms with it  He was hypotensive last visit so I decreased his Areli Cooler  I am seeing him today visit to see how he is doing  He says he feels much better on the lower dose  He has an ICD with EF 25%   He has permanent atrial fibrillation, on warfarin. He had CABG, MR repair 2008      He was admitted in October 2017 with chest pain. Dr. Marquis Hernandez did a cardiac catheterization which did show some CAD, not really amenable to intervention. Echo showed EF up to 50%. He is managed medically. He has done well  e is doing well. Denies weight gain. No orthopnea or PND.   He says he no longer sees Dr. Marquis Hernandez  He was seeing Dr. Pantera Fan  He had battery replacement for his ICD on 4/22 with Dr. Pantera Fan      Patient Active Problem List   Diagnosis    CAD (coronary artery disease)    Aortic stenosis    SSS (sick sinus syndrome) ([address]    Hyperlipidemia    Kaposi's sarcoma ([address]    S/P CABG (coronary artery bypass graft)    Medtronic single ICD    GERD (gastroesophageal reflux disease)    Hyperuricemia    HTN (hypertension)    S/P MVR (mitral valve repair), Physio ring, 2008    Persistent atrial fibrillation ([address]    Pacemaker    Dysphagia    Hoarseness    Laryngeal disorder    S/P AVR (aortic valve replacement)    Anticoagulated on Coumadin    Mild cognitive impairment    Chronic systolic CHF (congestive heart failure) ([address]    ICD (implantable cardioverter-defibrillator) battery depletion       Current Outpatient Medications   Medication Sig Dispense Refill    warfarin (COUMADIN) 5 MG tablet Take as directed by [address] [place] [address] [facility]. 135 tablets = 90 days 135 tablet 3    LORazepam (ATIVAN) 1 MG tablet TAKE 1 TABLET BY MOUTH EVERY DAY AT NIGHT 30 tablet 0    furosemide (LASIX) 40 MG tablet TAKE 1 TABLET BY MOUTH DAILY 90 tablet 3    donepezil (ARICEPT) 5 MG tablet TAKE 1 TABLET BY MOUTH EVERY NIGHT 90 tablet 5    digoxin (DIGOX) 250 MCG tablet Take 1 tablet by mouth daily 90 tablet 3    atorvastatin (LIPITOR) 20 MG tablet TAKE 1 TABLET BY MOUTH ONE TIME A DAY AT NIGHT 90 tablet 2    allopurinol (ZYLOPRIM) 100 MG tablet TAKE 1 TABLET BY MOUTH EVERY MORNING 90 tablet 3    carvedilol (COREG) 6.25 MG tablet Take 1 tablet by mouth 2 times daily 180 tablet 3    sacubitril-valsartan (ENTRESTO) 24-26 MG per tablet Take 1 tablet by mouth 2 times daily for 112 doses 112 tablet 0    Melatonin 2.5 MG CHEW Take 1 tablet by mouth nightly      Lidocaine-Methyl Sal-Camphor (VIVA EX) Apply topically nightly      loperamide (IMODIUM) 2 MG capsule Take 2 mg by mouth See Admin Instructions Indications: Diarrhea      Multiple Vitamins-Minerals (THERAPEUTIC MULTIVITAMIN-MINERALS) tablet Take 1 tablet by mouth 2 times daily Indications: Nutritional Support Noon and Supper      acetaminophen (TYLENOL) 325 MG tablet Take 650 mg by mouth every 6 hours as needed for Pain or Fever Indications: Pain Don't take more than 3,000 mg per day      Calcium Carbonate-Vitamin D (CALCIUM + D PO) Take 1 tablet by mouth 2 times daily Indications: Treatment to Prevent Vitamin Deficiency Noon and Supper      aspirin 81 MG EC tablet Take 81 mg by mouth nightly Indications: Anticoagulant Therapy With food. No current facility-administered medications for this visit.         No Known Allergies    Review of Systems - General ROS: negative  Psychological ROS: negative  Hematological and Lymphatic ROS: No history of blood clots or bleeding disorder. Respiratory ROS:noo shortness of breath, or wheezing  Cardiovascular ROS: he does have dyspnea on exertion, usually about one block  Gastrointestinal ROS: no abdominal pain, change in bowel habits, or black or bloody stools  Genito-Urinary ROS: no dysuria, hesitancy, nocturia present  Musculoskeletal ROS:  negative  Neurological ROS: no TIA or stroke symptoms, dizziness as above  Dermatological ROS: negative    Blood pressure 126/72, pulse 73, height 5' 8\" (1.727 m), weight 181 lb (82.1 kg). Body mass index is 27.52 kg/m². Physical Examination: General appearance - alert, well appearing, and in no distress  Mental status - alert, oriented to person, place, and time  Neck - Neck is supple, no JVD or carotid bruits. No thyromegaly or adenopathy. Chest - clear to auscultation, no wheezes, rales or rhonchi, symmetric air entry  Heart - normal rate, regular rhythm, normal S1, S2, loud 3/6 systolic murmur over the entire precordium, no rubs, clicks or gallops  Abdomen - soft, nontender, nondistended, no masses or organomegaly  Neurological - alert, oriented, normal speech, no focal findings or movement disorder noted  Extremities - peripheral pulses normal, no pedal edema, no clubbing or cyanosis  Skin - normal coloration and turgor, no rashes, no suspicious skin lesions noted    No orders of the defined types were placed in this encounter. Outpatient Encounter Medications as of 1/21/2021   Medication Sig Dispense Refill    warfarin (COUMADIN) 5 MG tablet Take as directed by [facility].  135 tablets = 90 days 135 tablet 3    LORazepam (ATIVAN) 1 MG tablet TAKE 1 TABLET BY MOUTH EVERY DAY AT NIGHT 30 tablet 0    furosemide (LASIX) 40 MG tablet TAKE 1 TABLET BY MOUTH DAILY 90 tablet 3    donepezil (ARICEPT) 5 MG tablet TAKE 1 TABLET BY MOUTH EVERY NIGHT 90 tablet 5    digoxin (DIGOX) 250 MCG tablet Take 1 tablet by mouth daily 90 tablet 3    atorvastatin (LIPITOR) 20 MG tablet TAKE 1 TABLET BY MOUTH ONE TIME A DAY AT NIGHT 90 tablet 2    allopurinol (ZYLOPRIM) 100 MG tablet TAKE 1 TABLET BY MOUTH EVERY MORNING 90 tablet 3    carvedilol (COREG) 6.25 MG tablet Take 1 tablet by mouth 2 times daily 180 tablet 3    sacubitril-valsartan (ENTRESTO) 24-26 MG per tablet Take 1 tablet by mouth 2 times daily for 112 doses 112 tablet 0    Melatonin 2.5 MG CHEW Take 1 tablet by mouth nightly      Lidocaine-Methyl Sal-Camphor (VIVA EX) Apply topically nightly      loperamide (IMODIUM) 2 MG capsule Take 2 mg by mouth See Admin Instructions Indications: Diarrhea      Multiple Vitamins-Minerals (THERAPEUTIC MULTIVITAMIN-MINERALS) tablet Take 1 tablet by mouth 2 times daily Indications: Nutritional Support Noon and Supper      acetaminophen (TYLENOL) 325 MG tablet Take 650 mg by mouth every 6 hours as needed for Pain or Fever Indications: Pain Don't take more than 3,000 mg per day      Calcium Carbonate-Vitamin D (CALCIUM + D PO) Take 1 tablet by mouth 2 times daily Indications: Treatment to Prevent Vitamin Deficiency Noon and Supper      aspirin 81 MG EC tablet Take 81 mg by mouth nightly Indications: Anticoagulant Therapy With food.  [DISCONTINUED] LORazepam (ATIVAN) 1 MG tablet TAKE 1 TABLET BY MOUTH EVERY DAY AT NIGHT 30 tablet 0    [DISCONTINUED] warfarin (COUMADIN) 5 MG tablet Take as directed by [facility].  135 tablets = 90 days 135 tablet 3    [DISCONTINUED] furosemide (LASIX) 40 MG tablet TAKE 1 TABLET BY MOUTH DAILY 90 tablet 3    [DISCONTINUED] donepezil (ARICEPT) 5 MG tablet TAKE 1 TABLET BY MOUTH EVERY NIGHT 90 tablet 5    [DISCONTINUED] digoxin (DIGOX) 250 MCG tablet Take 1 tablet by mouth daily 90 tablet 3    [DISCONTINUED] carvedilol (COREG) 6.25 MG tablet Take 1 tablet by mouth 2 times daily 180 tablet 3    [DISCONTINUED] atorvastatin (LIPITOR) 20 MG tablet TAKE 1 TABLET BY MOUTH ONE TIME A DAY AT NIGHT 90 tablet 2    [DISCONTINUED] allopurinol (ZYLOPRIM) 100 MG tablet TAKE 1 TABLET BY MOUTH EVERY MORNING 90 tablet 3     No facility-administered encounter medications on file as of 1/21/2021. DIAGNOSES   Diagnosis Orders   1. Medication management     2. Permanent atrial fibrillation (HCC)  warfarin (COUMADIN) 5 MG tablet   3. Anxiety  LORazepam (ATIVAN) 1 MG tablet   4. Chronic systolic congestive heart failure (HCC)  furosemide (LASIX) 40 MG tablet    digoxin (DIGOX) 250 MCG tablet    carvedilol (COREG) 6.25 MG tablet   5. Mild cognitive impairment  donepezil (ARICEPT) 5 MG tablet   6. Hyperlipidemia, unspecified hyperlipidemia type  atorvastatin (LIPITOR) 20 MG tablet   7. H/O: gout  allopurinol (ZYLOPRIM) 100 MG tablet   8. Essential hypertension     9. S/P CABG (coronary artery bypass graft)     10. S/P AVR (aortic valve replacement)     11. ICD (implantable cardioverter-defibrillator) in place     12. S/P MVR (mitral valve repair), Physio ring, 2008     13.  Kaposi's sarcoma [facility]         I suspect his dizziness may be related to hypotension  I  cut his Entresto in half systolic blood pressure today is 126  Continue Entresto 2426 mg twice daily  Follow-up here 3 months  I discussed the situation at length with patient and family and answered all their questions

## 2021-01-26 ENCOUNTER — PROCEDURE VISIT (OUTPATIENT)
Dept: CARDIOLOGY CLINIC | Age: 86
End: 2021-01-26
Payer: MEDICARE

## 2021-01-26 DIAGNOSIS — Z95.810 AUTOMATIC IMPLANTABLE CARDIOVERTER-DEFIBRILLATOR IN SITU: ICD-10-CM

## 2021-01-26 DIAGNOSIS — I50.22 CHRONIC SYSTOLIC CHF (CONGESTIVE HEART FAILURE) (HCC): Primary | ICD-10-CM

## 2021-01-26 PROCEDURE — G2066 INTER DEVC REMOTE 30D: HCPCS | Performed by: INTERNAL MEDICINE

## 2021-01-26 PROCEDURE — 93297 REM INTERROG DEV EVAL ICPMS: CPT | Performed by: INTERNAL MEDICINE

## 2021-01-26 NOTE — PROGRESS NOTES
[facility]   Follows with Elbert Hurtado     7 years on device   VT/SVT   abdullahivol WNL

## 2021-02-10 ENCOUNTER — HOSPITAL ENCOUNTER (OUTPATIENT)
Dept: PHARMACY | Age: 86
Setting detail: THERAPIES SERIES
Discharge: HOME | End: 2021-02-10
Payer: MEDICARE

## 2021-02-10 ENCOUNTER — TELEPHONE (OUTPATIENT)
Dept: INTERNAL MEDICINE CLINIC | Age: 86
End: 2021-02-10

## 2021-02-10 VITALS — TEMPERATURE: 97.9 F

## 2021-02-10 DIAGNOSIS — Z79.01 ANTICOAGULATED ON COUMADIN: Primary | ICD-10-CM

## 2021-02-10 DIAGNOSIS — I48.19 PERSISTENT ATRIAL FIBRILLATION (HCC): ICD-10-CM

## 2021-02-10 DIAGNOSIS — Z79.01 ANTICOAGULATED ON COUMADIN: ICD-10-CM

## 2021-02-10 DIAGNOSIS — R42 POSTURAL DIZZINESS: Primary | ICD-10-CM

## 2021-02-10 LAB
HCT VFR BLD CALC: 32 % (ref 42–52)
HEMOGLOBIN: 9.9 GM/DL (ref 14–18)
INR BLD: 3.42 (ref 0.85–1.13)
POC INR: 4 (ref 0.8–1.2)

## 2021-02-10 PROCEDURE — 99211 OFF/OP EST MAY X REQ PHY/QHP: CPT

## 2021-02-10 PROCEDURE — 85018 HEMOGLOBIN: CPT

## 2021-02-10 PROCEDURE — 85610 PROTHROMBIN TIME: CPT

## 2021-02-10 PROCEDURE — 85014 HEMATOCRIT: CPT

## 2021-02-10 NOTE — TELEPHONE ENCOUNTER
RN called pt's phone-- daughter answered. RN gave pt's daughter Dr. Irish Lesch recommendations. Daughter understood and will take pt to get his blood work next week. RN to send blood work order to [employer].

## 2021-02-10 NOTE — TELEPHONE ENCOUNTER
RN received a voice message from coumadin clinic to inform Dr. Feliberto Boas that the pt was there today and his reading on their machine was 4 which prompted the coumadin clinic to send him to the lab to get blood work drawn. His INR was 3.42 and his H&H was hgb 9.9 and hct 32.0 which has dropped since November (hgb 12.3 and hct 39.2). There are no signs of bleeding and the patient has intermittent dizziness and SOB which are normal for him. Coumadin clinic does not have any questions-- just wanted to make you aware of the situation.

## 2021-02-10 NOTE — PROGRESS NOTES
Medication Management [address]  [phone] (phone)  [phone] (fax)      Mr. Michelle Gutierrez is a 80 y.o.  male with history of persistent atrial fib. , per Dr. Zoe Werner referral, who presents today for Warfarin monitoring and adjustment (4 week visit). Patient verifies tablet strength. Followed printed instructions for dose. Had today's dose already - reminded to wait until after appt. No missed or extra doses. Patient denies bleeding/bruising/swelling/chest pain. Has usual intermittent SOB. No blood in urine or stool. No dietary changes. No changes in medication/OTC agents/herbals. No change in alcohol use or tobacco use. No change in activity level. Patient denies headaches/falls. Has usual intermittent dizziness. No vomiting or acute illness. Had minor diarrhea 2 days last week - took nothing. Reminded to avoid Pepto-Bismol. No procedures scheduled in the future at this time. Assessment:     Lab Results   Component Value Date    INR 3.42 (H) 02/10/2021    INR 4.00 (H) 02/10/2021    INR 2.40 (H) 01/13/2021     INR supratherapeutic - goal 2-3. Recent Labs     02/10/21  0940   INR 3.42*   POCT INR 4.0. Today's H&H:  9.9/32 (12.3/39.2 on 11/30/20). Plan:  POCT INR ordered/performed/result reviewed. Meter showed flashing \"C. \"  Blood for INR/H&H drawn left arm per lab staff, per policy/procedure - sent to lab. Hold tomorrow, Th, then continue PO  Coumadin 5 mg MWF, 7.5 mg TThSS. Recheck INR in 1 week(s). (Report given - orders entered by Alok Gonzalez, PharmD.)  Patient reminded to call the Anticoagulation Clinic with any signs or symptoms of bleeding or with any medication changes. Patient given instructions utilizing the teach back method. Advised extra caution. Verified daughter's phone number Antonio Rivera) - handles medications (if lab result requires new orders). Discharged ambulatory in no apparent distress, wearing mask, with son. New orders from Yesika Garcia., PharmD., after reviewing lab INR/H&H:  2.5 mg Coumadin tomorrow, instead of holding - see in 2 weeks. Notify Dr. Josefa Lovett office of H&H (done, plus condition report). Called new orders to daughter's voicemail, as well as new appt. for 0920 2/24. Also advised her of drop in blood count. After visit summary printed and reviewed with patient/son.       Medications reviewed and updated on home medication list.    Influenza vaccine:     [] given    [x] declined   [x] received previously   [] plans to receive at a later time   [] refused    [x] documented in Epic

## 2021-02-17 DIAGNOSIS — I48.21 PERMANENT ATRIAL FIBRILLATION (HCC): Primary | ICD-10-CM

## 2021-02-17 LAB
HCT VFR BLD CALC: 32 % (ref 41–53)
HEMOGLOBIN: 10.3 G/DL (ref 13.5–17.5)

## 2021-02-17 RX ORDER — SACUBITRIL AND VALSARTAN 24; 26 MG/1; MG/1
1 TABLET, FILM COATED ORAL 2 TIMES DAILY
Qty: 90 TABLET | Refills: 3 | Status: SHIPPED | OUTPATIENT
Start: 2021-02-17 | End: 2021-05-05 | Stop reason: SDUPTHER

## 2021-02-23 DIAGNOSIS — F41.9 ANXIETY: Primary | ICD-10-CM

## 2021-02-23 RX ORDER — LORAZEPAM 1 MG/1
1 TABLET ORAL NIGHTLY
Qty: 30 TABLET | Refills: 0 | Status: SHIPPED | OUTPATIENT
Start: 2021-02-23 | End: 2021-03-23 | Stop reason: SDUPTHER

## 2021-02-24 ENCOUNTER — HOSPITAL ENCOUNTER (OUTPATIENT)
Dept: PHARMACY | Age: 86
Setting detail: THERAPIES SERIES
Discharge: HOME | End: 2021-02-24
Payer: MEDICARE

## 2021-02-24 VITALS — TEMPERATURE: 97.5 F

## 2021-02-24 DIAGNOSIS — I48.19 PERSISTENT ATRIAL FIBRILLATION (HCC): ICD-10-CM

## 2021-02-24 LAB — POC INR: 3.6 (ref 0.8–1.2)

## 2021-02-24 PROCEDURE — 85610 PROTHROMBIN TIME: CPT

## 2021-02-24 PROCEDURE — 36416 COLLJ CAPILLARY BLOOD SPEC: CPT

## 2021-02-24 PROCEDURE — 99211 OFF/OP EST MAY X REQ PHY/QHP: CPT

## 2021-02-24 NOTE — PROGRESS NOTES
Medication Management [address]  [phone] (phone)  [phone] (fax)      Mr. Fermin Jamison is a 80 y.o.  male with history of Afib who presents today for anticoagulation monitoring and adjustment. Patient verifies current dosing regimen and tablet strength. No missed or extra doses. Patient denies s/s bleeding/bruising/swelling/SOB/chest pain  No blood in urine or stool. No dietary changes. No changes in medication/OTC agents/Herbals. No change in alcohol use or tobacco use. No change in activity level. Patient denies headaches/dizziness/lightheadedness/falls. No vomiting/diarrhea or acute illness. No Procedures scheduled in the future at this time. Assessment:   Lab Results   Component Value Date    INR 3.60 (H) 02/24/2021    INR 3.42 (H) 02/10/2021    INR 4.00 (H) 02/10/2021     INR supratherapeutic   Recent Labs     02/24/21  0922   INR 3.60*     Patient interview completed and discussed with pharmacist by Manasa GusmanD Candidate 2021 2/24/2021, 9:24 AM      Has been very stable on this maintenance dose for months, however this is the 2nd supratherapeutic INR with no identifiable cause. Plan:    2.5 mg x1,  then decrease Coumadin 7.5 mg MWF, 5 mg TThSaS. Recheck INR in 2 week(s). Patient reminded to call the Anticoagulation Clinic with signs or symptoms of bleeding or with any medication changes. Patient given instructions utilizing the teach back method. Discharged ambulatory in no apparent distress. After visit summary printed and reviewed with patient.       Medications reviewed and updated on home medication list Yes    Influenza vaccine:     [] given    [] declined   [x] received previously   [] plans to receive at a later time   [] refused    [x] documented in [address]:  [facility]: No  Total # of Interventions Recommended: 2 - Decreased Dose #: 1  - Maintenance Safety Lab Monitoring #: 1  Total Interventions Accepted: 2  Time Spent (min): Leslie Dorantes, PharmD  [address]

## 2021-03-02 ENCOUNTER — PROCEDURE VISIT (OUTPATIENT)
Dept: CARDIOLOGY CLINIC | Age: 86
End: 2021-03-02
Payer: MEDICARE

## 2021-03-02 DIAGNOSIS — Z95.810 AUTOMATIC IMPLANTABLE CARDIOVERTER-DEFIBRILLATOR IN SITU: Primary | ICD-10-CM

## 2021-03-02 PROCEDURE — 93295 DEV INTERROG REMOTE 1/2/MLT: CPT | Performed by: INTERNAL MEDICINE

## 2021-03-02 PROCEDURE — 93296 REM INTERROG EVL PM/IDS: CPT | Performed by: INTERNAL MEDICINE

## 2021-03-02 NOTE — PROGRESS NOTES
[facility] medtronic single icd  Follows with Mihir Due     Battery 6.9 years   RV imped 342  Shock 37  SVC 45  R waves 9.8  94.9% paced   optivol WNL

## 2021-03-10 ENCOUNTER — HOSPITAL ENCOUNTER (OUTPATIENT)
Dept: PHARMACY | Age: 86
Setting detail: THERAPIES SERIES
Discharge: HOME | End: 2021-03-10
Payer: MEDICARE

## 2021-03-10 VITALS — TEMPERATURE: 97.6 F

## 2021-03-10 DIAGNOSIS — I48.19 PERSISTENT ATRIAL FIBRILLATION (HCC): ICD-10-CM

## 2021-03-10 LAB — POC INR: 2.7 (ref 0.8–1.2)

## 2021-03-10 PROCEDURE — 99211 OFF/OP EST MAY X REQ PHY/QHP: CPT

## 2021-03-10 PROCEDURE — 85610 PROTHROMBIN TIME: CPT

## 2021-03-10 PROCEDURE — 36416 COLLJ CAPILLARY BLOOD SPEC: CPT

## 2021-03-10 NOTE — PROGRESS NOTES
Medication Management [address]  [phone] (phone)  [phone] (fax)      Mr. Ann-Marie Chan is a 80 y.o.  male with history of Afib who presents today for anticoagulation monitoring and adjustment. Patient verifies current dosing regimen and tablet strength. No missed or extra doses. Patient denies s/s bleeding/bruising/swelling/SOB/chest pain  No blood in urine or stool. No dietary changes. No changes in medication/OTC agents/Herbals. No change in alcohol use or tobacco use. No change in activity level. Patient denies headaches/dizziness/lightheadedness/falls. No vomiting/diarrhea or acute illness. No Procedures scheduled in the future at this time. Assessment:   Lab Results   Component Value Date    INR 2.70 (H) 03/10/2021    INR 3.60 (H) 02/24/2021    INR 3.42 (H) 02/10/2021     INR therapeutic   Recent Labs     03/10/21  0910   INR 2.70*         Plan:  Continue Coumadin 7.5 mg MWF, 5 mg TThSaS. Recheck INR in 4 week(s). Patient reminded to call the Anticoagulation Clinic with any signs or symptoms of bleeding or with any medication changes. Patient given instructions utilizing the teach back method. Discharged ambulatory in no apparent distress. After visit summary printed and reviewed with patient.       Medications reviewed and updated on home medication list Yes    Influenza vaccine:     [] given    [x] declined   [x] received previously   [] plans to receive at a later time   [] refused    [] documented in [address]: [address]: No  Total # of Interventions Recommended: 1  - Maintenance Safety Lab Monitoring #: 1  Total Interventions Accepted: 1  Time Spent (min): Leslie Dorantes, PharmD  [address]

## 2021-03-23 DIAGNOSIS — F41.9 ANXIETY: ICD-10-CM

## 2021-03-23 RX ORDER — LORAZEPAM 1 MG/1
1 TABLET ORAL NIGHTLY
Qty: 30 TABLET | Refills: 0 | Status: SHIPPED | OUTPATIENT
Start: 2021-03-23 | End: 2021-04-22 | Stop reason: SDUPTHER

## 2021-04-07 ENCOUNTER — HOSPITAL ENCOUNTER (OUTPATIENT)
Dept: PHARMACY | Age: 86
Setting detail: THERAPIES SERIES
Discharge: HOME | End: 2021-04-07
Payer: MEDICARE

## 2021-04-07 DIAGNOSIS — Z51.81 ENCOUNTER FOR THERAPEUTIC DRUG MONITORING: ICD-10-CM

## 2021-04-07 DIAGNOSIS — Z79.01 LONG TERM (CURRENT) USE OF ANTICOAGULANTS: ICD-10-CM

## 2021-04-07 DIAGNOSIS — I48.19 PERSISTENT ATRIAL FIBRILLATION (HCC): ICD-10-CM

## 2021-04-07 LAB — POC INR: 3.3 (ref 0.8–1.2)

## 2021-04-07 PROCEDURE — 36416 COLLJ CAPILLARY BLOOD SPEC: CPT | Performed by: PHARMACIST

## 2021-04-07 PROCEDURE — 85610 PROTHROMBIN TIME: CPT | Performed by: PHARMACIST

## 2021-04-07 PROCEDURE — 99211 OFF/OP EST MAY X REQ PHY/QHP: CPT | Performed by: PHARMACIST

## 2021-04-07 NOTE — PROGRESS NOTES
Medication Management [address] [place] [address]  [phone] (phone)  [phone] (fax)      Mr. Patsy Delgado is a 80 y.o.  male with history of Afib who presents today for anticoagulation monitoring and adjustment. Patient verifies current dosing regimen and tablet strength. No missed or extra doses. Patient denies s/s bleeding/bruising/swelling/SOB/chest pain  No blood in urine or stool. No dietary changes. No changes in medication/OTC agents/Herbals. No change in alcohol use or tobacco use. No change in activity level. Patient denies headaches/dizziness/lightheadedness/falls. No vomiting/diarrhea or acute illness. No Procedures scheduled in the future at this time. Assessment:   Lab Results   Component Value Date    INR 3.30 (H) 04/07/2021    INR 2.70 (H) 03/10/2021    INR 3.60 (H) 02/24/2021     INR supratherapeutic   Recent Labs     04/07/21  0907   INR 3.30*         Plan:  2.5 mg then decrease Coumadin 7.5mg MF, 5mg TWThSS. Recheck INR in 4 week(s). Patient reminded to call the Anticoagulation Clinic with signs or symptoms of bleeding or with any medication changes. Patient given instructions utilizing the teach back method. Discharged ambulatory in no apparent distress. After visit summary printed and reviewed with patient. Medications reviewed and updated on home medication list Yes      CLINICAL PHARMACY CONSULT: MED RECONCILIATION/REVIEW [address]  22. Tracking Only    PHSO: No  Total # of Interventions Recommended: 1  - Decreased Dose #: 1  - Maintenance Safety Lab Monitoring #: 1  Total Interventions Accepted: 1  Time Spent (min): [address] [facility] FARNCA [address], BCCCP 4/7/2021 9:24 AM

## 2021-04-08 NOTE — PROGRESS NOTES
Medication Management [address]   [phone] (phone)  [phone] (fax)      Mr. Mariza Hathaway is a 80 y.o.  male with history of persistent atrial fib. , per Dr. Abimbola Sheehan referral, who presents today for Warfarin monitoring and adjustment (4 week visit). Patient verifies tablet strength. Followed printed instructions for dose. No missed or extra doses. Patient denies  bleeding/bruising/swelling/SOB/chest pain. No blood in urine or stool. No dietary changes. No changes in medication/OTC agents/herbals. No change in alcohol use or tobacco use. No change in activity level. Patient denies headaches/falls. Has usual dizziness. No vomiting/diarrhea or acute illness. No procedures scheduled in the future at this time. Assessment:   Lab Results   Component Value Date    INR 2.10 (H) 11/25/2020    INR 2.20 (H) 10/28/2020    INR 2.10 (H) 09/30/2020     INR therapeutic - goal 2-3. Recent Labs     11/25/20  0926   INR 2.10*     Plan:  POCT INR ordered/performed/result reviewed. Continue PO Coumadin 5 mg MWF, 7.5 mg TThSS. Recheck INR in 5 week(s). Patient reminded to call the Anticoagulation Clinic with any signs or symptoms of bleeding or with any medication changes. Patient given instructions utilizing the teach back method. Discharged ambulatory in no apparent distress, wearing mask, with son. After visit summary printed and reviewed with son.     Medications reviewed and updated on home medication list.    Influenza vaccine:     [] given    [x] declined   [x] received previously   [] plans to receive at a later time   [] refused    [x] documented in Epic
Female

## 2021-04-13 ENCOUNTER — PROCEDURE VISIT (OUTPATIENT)
Dept: CARDIOLOGY CLINIC | Age: 86
End: 2021-04-13
Payer: MEDICARE

## 2021-04-13 DIAGNOSIS — I50.22 CHRONIC SYSTOLIC CHF (CONGESTIVE HEART FAILURE) (HCC): Primary | ICD-10-CM

## 2021-04-13 DIAGNOSIS — Z95.810 AUTOMATIC IMPLANTABLE CARDIOVERTER-DEFIBRILLATOR IN SITU: ICD-10-CM

## 2021-04-13 PROCEDURE — G2066 INTER DEVC REMOTE 30D: HCPCS | Performed by: INTERNAL MEDICINE

## 2021-04-13 PROCEDURE — 93297 REM INTERROG DEV EVAL ICPMS: CPT | Performed by: INTERNAL MEDICINE

## 2021-04-22 ENCOUNTER — OFFICE VISIT (OUTPATIENT)
Dept: INTERNAL MEDICINE CLINIC | Age: 86
End: 2021-04-22
Payer: MEDICARE

## 2021-04-22 VITALS
SYSTOLIC BLOOD PRESSURE: 124 MMHG | WEIGHT: 182 LBS | BODY MASS INDEX: 27.58 KG/M2 | HEART RATE: 53 BPM | HEIGHT: 68 IN | DIASTOLIC BLOOD PRESSURE: 62 MMHG | TEMPERATURE: 97.3 F

## 2021-04-22 DIAGNOSIS — Z95.1 S/P CABG (CORONARY ARTERY BYPASS GRAFT): ICD-10-CM

## 2021-04-22 DIAGNOSIS — Z95.810 ICD (IMPLANTABLE CARDIOVERTER-DEFIBRILLATOR) IN PLACE: ICD-10-CM

## 2021-04-22 DIAGNOSIS — I10 ESSENTIAL HYPERTENSION: ICD-10-CM

## 2021-04-22 DIAGNOSIS — I48.21 PERMANENT ATRIAL FIBRILLATION (HCC): ICD-10-CM

## 2021-04-22 DIAGNOSIS — F41.9 ANXIETY: ICD-10-CM

## 2021-04-22 DIAGNOSIS — Z95.2 S/P AVR (AORTIC VALVE REPLACEMENT): ICD-10-CM

## 2021-04-22 DIAGNOSIS — I50.22 CHRONIC SYSTOLIC CONGESTIVE HEART FAILURE (HCC): Primary | ICD-10-CM

## 2021-04-22 DIAGNOSIS — Z87.39 H/O: GOUT: ICD-10-CM

## 2021-04-22 DIAGNOSIS — R41.3 MEMORY LOSS: ICD-10-CM

## 2021-04-22 DIAGNOSIS — E78.5 HYPERLIPIDEMIA, UNSPECIFIED HYPERLIPIDEMIA TYPE: ICD-10-CM

## 2021-04-22 PROCEDURE — 4040F PNEUMOC VAC/ADMIN/RCVD: CPT | Performed by: INTERNAL MEDICINE

## 2021-04-22 PROCEDURE — G8427 DOCREV CUR MEDS BY ELIG CLIN: HCPCS | Performed by: INTERNAL MEDICINE

## 2021-04-22 PROCEDURE — 1123F ACP DISCUSS/DSCN MKR DOCD: CPT | Performed by: INTERNAL MEDICINE

## 2021-04-22 PROCEDURE — G8417 CALC BMI ABV UP PARAM F/U: HCPCS | Performed by: INTERNAL MEDICINE

## 2021-04-22 PROCEDURE — 1036F TOBACCO NON-USER: CPT | Performed by: INTERNAL MEDICINE

## 2021-04-22 PROCEDURE — 99214 OFFICE O/P EST MOD 30 MIN: CPT | Performed by: INTERNAL MEDICINE

## 2021-04-22 RX ORDER — LORAZEPAM 1 MG/1
1 TABLET ORAL NIGHTLY
Qty: 60 TABLET | Refills: 3 | Status: SHIPPED | OUTPATIENT
Start: 2021-04-22 | End: 2021-05-22

## 2021-04-22 ASSESSMENT — PATIENT HEALTH QUESTIONNAIRE - PHQ9
SUM OF ALL RESPONSES TO PHQ9 QUESTIONS 1 & 2: 0
1. LITTLE INTEREST OR PLEASURE IN DOING THINGS: 0
SUM OF ALL RESPONSES TO PHQ QUESTIONS 1-9: 0
2. FEELING DOWN, DEPRESSED OR HOPELESS: 0
SUM OF ALL RESPONSES TO PHQ QUESTIONS 1-9: 0

## 2021-04-22 NOTE — PROGRESS NOTES
Chief Complaint   Patient presents with    3 Month Follow-Up       Patient presents for  evaluation of dizziness. I last saw the patient 12 weeks   ago. Patient is followed on a regular basis by Dr. Phoebe Anderson  Patient has not had any admissions to the hospital or change in medications since the last visit here. Pertinent past history reviewed and summarized below    He has an ICD with EF 25%   He has permanent atrial fibrillation, on warfarin. He had CABG, MR repair 2008  I had the lower Entresto dose as he was hypotensive and dizzy  His son is with him today  He said they are considering assisted living    He was admitted in October 2017 with chest pain. Dr. Frederic Rojas did a cardiac catheterization which did show some CAD, not really amenable to intervention. Echo showed EF up to 50%. He is managed medically. He has done well  e is doing well. Denies weight gain. No orthopnea or PND.   He says he no longer sees Dr. Frederic Rojas  He was seeing Dr. Gissel Healy  He had battery replacement for his ICD on 4/22 with Dr. Gissel Healy      Patient Active Problem List   Diagnosis    CAD (coronary artery disease)    Aortic stenosis    SSS (sick sinus syndrome) ([address]    Hyperlipidemia    Kaposi's sarcoma ([address]    S/P CABG (coronary artery bypass graft)    Medtronic single ICD    GERD (gastroesophageal reflux disease)    Hyperuricemia    HTN (hypertension)    S/P MVR (mitral valve repair), Physio ring, 2008    Persistent atrial fibrillation ([address]    Pacemaker    Dysphagia    Hoarseness    Laryngeal disorder    S/P AVR (aortic valve replacement)    Anticoagulated on Coumadin    Mild cognitive impairment    Chronic systolic CHF (congestive heart failure) ([address]    ICD (implantable cardioverter-defibrillator) battery depletion    Long term (current) use of anticoagulants    Encounter for therapeutic drug monitoring       Current Outpatient Medications   Medication Sig Dispense Refill    LORazepam (ATIVAN) 1 MG tablet Take 1 tablet by mouth nightly for 30 days. 30 tablet 0    sacubitril-valsartan (ENTRESTO) 24-26 MG per tablet Take 1 tablet by mouth 2 times daily 90 tablet 3    warfarin (COUMADIN) 5 MG tablet Take as directed by [facility]. 135 tablets = 90 days 135 tablet 3    furosemide (LASIX) 40 MG tablet TAKE 1 TABLET BY MOUTH DAILY 90 tablet 3    donepezil (ARICEPT) 5 MG tablet TAKE 1 TABLET BY MOUTH EVERY NIGHT 90 tablet 5    digoxin (DIGOX) 250 MCG tablet Take 1 tablet by mouth daily 90 tablet 3    atorvastatin (LIPITOR) 20 MG tablet TAKE 1 TABLET BY MOUTH ONE TIME A DAY AT NIGHT 90 tablet 2    allopurinol (ZYLOPRIM) 100 MG tablet TAKE 1 TABLET BY MOUTH EVERY MORNING 90 tablet 3    carvedilol (COREG) 6.25 MG tablet Take 1 tablet by mouth 2 times daily 180 tablet 3    Melatonin 2.5 MG CHEW Take 1 tablet by mouth nightly      loperamide (IMODIUM) 2 MG capsule Take 2 mg by mouth See Admin Instructions Indications: Diarrhea      Multiple Vitamins-Minerals (THERAPEUTIC MULTIVITAMIN-MINERALS) tablet Take 1 tablet by mouth 2 times daily Indications: Nutritional Support Noon and Supper      acetaminophen (TYLENOL) 325 MG tablet Take 650 mg by mouth every 6 hours as needed for Pain or Fever Indications: Pain Don't take more than 3,000 mg per day      Calcium Carbonate-Vitamin D (CALCIUM + D PO) Take 1 tablet by mouth 2 times daily Indications: Treatment to Prevent Vitamin Deficiency Noon and Supper      aspirin 81 MG EC tablet Take 81 mg by mouth nightly Indications: Anticoagulant Therapy With food. No current facility-administered medications for this visit. No Known Allergies    Review of Systems - General ROS: negative  Psychological ROS: negative  Hematological and Lymphatic ROS: No history of blood clots or bleeding disorder.    Respiratory ROS:noo shortness of breath, or wheezing  Cardiovascular ROS: he does have dyspnea on exertion, usually about one block  Gastrointestinal ROS: no abdominal pain, change in bowel habits, or black or bloody stools  Genito-Urinary ROS: no dysuria, hesitancy, nocturia present  Musculoskeletal ROS:  negative  Neurological ROS: no TIA or stroke symptoms, patient says he does have some memory issues  Dermatological ROS: negative    Blood pressure 124/62, pulse 53, temperature 97.3 °F (36.3 °C), height 5' 8\" (1.727 m), weight 182 lb (82.6 kg). Body mass index is 27.67 kg/m². Physical Examination: General appearance - alert, well appearing, and in no distress  Mental status - alert, oriented to person, place, and time  Neck - Neck is supple, no JVD or carotid bruits. No thyromegaly or adenopathy. Chest - clear to auscultation, no wheezes, rales or rhonchi, symmetric air entry  Heart - normal rate, regular rhythm, normal S1, S2, loud 3/6 systolic murmur over the entire precordium, no rubs, clicks or gallops  Abdomen - soft, nontender, nondistended, no masses or organomegaly  Neurological - alert, oriented, normal speech, no focal findings or movement disorder noted  Extremities - peripheral pulses normal, no pedal edema, no clubbing or cyanosis  Skin - normal coloration and turgor, no rashes, no suspicious skin lesions noted    No orders of the defined types were placed in this encounter. Outpatient Encounter Medications as of 4/22/2021   Medication Sig Dispense Refill    LORazepam (ATIVAN) 1 MG tablet Take 1 tablet by mouth nightly for 30 days. 30 tablet 0    sacubitril-valsartan (ENTRESTO) 24-26 MG per tablet Take 1 tablet by mouth 2 times daily 90 tablet 3    warfarin (COUMADIN) 5 MG tablet Take as directed by [facility].  135 tablets = 90 days 135 tablet 3    furosemide (LASIX) 40 MG tablet TAKE 1 TABLET BY MOUTH DAILY 90 tablet 3    donepezil (ARICEPT) 5 MG tablet TAKE 1 TABLET BY MOUTH EVERY NIGHT 90 tablet 5    digoxin (DIGOX) 250 MCG tablet Take 1 tablet by mouth daily 90 tablet 3    atorvastatin (LIPITOR) 20 MG tablet TAKE 1 TABLET BY MOUTH ONE TIME A DAY AT NIGHT 90 tablet 2    allopurinol (ZYLOPRIM) 100 MG tablet TAKE 1 TABLET BY MOUTH EVERY MORNING 90 tablet 3    carvedilol (COREG) 6.25 MG tablet Take 1 tablet by mouth 2 times daily 180 tablet 3    Melatonin 2.5 MG CHEW Take 1 tablet by mouth nightly      loperamide (IMODIUM) 2 MG capsule Take 2 mg by mouth See Admin Instructions Indications: Diarrhea      Multiple Vitamins-Minerals (THERAPEUTIC MULTIVITAMIN-MINERALS) tablet Take 1 tablet by mouth 2 times daily Indications: Nutritional Support Noon and Supper      acetaminophen (TYLENOL) 325 MG tablet Take 650 mg by mouth every 6 hours as needed for Pain or Fever Indications: Pain Don't take more than 3,000 mg per day      Calcium Carbonate-Vitamin D (CALCIUM + D PO) Take 1 tablet by mouth 2 times daily Indications: Treatment to Prevent Vitamin Deficiency Noon and Supper      aspirin 81 MG EC tablet Take 81 mg by mouth nightly Indications: Anticoagulant Therapy With food.  [DISCONTINUED] Lidocaine-Methyl Sal-Camphor (VIVA EX) Apply topically nightly       No facility-administered encounter medications on file as of 4/22/2021. DIAGNOSES   Diagnosis Orders   1. Chronic systolic congestive heart failure ([facility]     2. Anxiety  LORazepam (ATIVAN) 1 MG tablet   3. Permanent atrial fibrillation ([facility]     4. Hyperlipidemia, unspecified hyperlipidemia type     5. Essential hypertension     6. H/O: gout     7. S/P CABG (coronary artery bypass graft)     8. ICD (implantable cardioverter-defibrillator) in place     9. S/P AVR (aortic valve replacement)     10.  Memory loss       I think he does have some baseline dementia  He does not know the year  He cannot spell world backwards  I want to do a Mini-Mental status exam he refused  His son told me that if they can get him go to assisted living he will bring him in for evaluation  I will referral Ativan 1 mg at bedtime it helps him sleep   He does have permanent atrial fibrillation on Coumadin  He

## 2021-05-05 ENCOUNTER — HOSPITAL ENCOUNTER (OUTPATIENT)
Dept: PHARMACY | Age: 86
Setting detail: THERAPIES SERIES
Discharge: HOME | End: 2021-05-05
Payer: MEDICARE

## 2021-05-05 ENCOUNTER — NURSE ONLY (OUTPATIENT)
Dept: CARDIOLOGY CLINIC | Age: 86
End: 2021-05-05
Payer: MEDICARE

## 2021-05-05 DIAGNOSIS — I48.19 PERSISTENT ATRIAL FIBRILLATION (HCC): ICD-10-CM

## 2021-05-05 DIAGNOSIS — I48.21 PERMANENT ATRIAL FIBRILLATION (HCC): ICD-10-CM

## 2021-05-05 DIAGNOSIS — Z79.01 LONG TERM (CURRENT) USE OF ANTICOAGULANTS: ICD-10-CM

## 2021-05-05 DIAGNOSIS — Z95.810 AUTOMATIC IMPLANTABLE CARDIOVERTER-DEFIBRILLATOR IN SITU: Primary | ICD-10-CM

## 2021-05-05 DIAGNOSIS — Z51.81 ENCOUNTER FOR THERAPEUTIC DRUG MONITORING: ICD-10-CM

## 2021-05-05 LAB — POC INR: 3.3 (ref 0.8–1.2)

## 2021-05-05 PROCEDURE — 36416 COLLJ CAPILLARY BLOOD SPEC: CPT | Performed by: PHARMACIST

## 2021-05-05 PROCEDURE — 85610 PROTHROMBIN TIME: CPT | Performed by: PHARMACIST

## 2021-05-05 PROCEDURE — 99211 OFF/OP EST MAY X REQ PHY/QHP: CPT | Performed by: PHARMACIST

## 2021-05-05 PROCEDURE — 93282 PRGRMG EVAL IMPLANTABLE DFB: CPT | Performed by: INTERNAL MEDICINE

## 2021-05-05 RX ORDER — SACUBITRIL AND VALSARTAN 24; 26 MG/1; MG/1
1 TABLET, FILM COATED ORAL 2 TIMES DAILY
Qty: 180 TABLET | Refills: 3 | Status: SHIPPED | OUTPATIENT
Start: 2021-05-05 | End: 2021-08-03

## 2021-05-05 NOTE — PROGRESS NOTES
DR Meraz [facility] [place] [facility]   [employer]TR[employer] SINGLE ICD CHECK IN OFFICE  HAS CARELINK AT HOME  BATTERY 8 YRS REMAINING    PRESENTS IN   UNDERLYING DEPENDENT TODAY   RV IMPEDENCE 342  RV DEFIB 37  SVC 45  NO RV WAVES MEASURED TODAY  VVIR 70  RV THRESHOLD 1.25 @ 0.4  VENT AMPLITUDE ADAPTIVE   PT HAD 34 NS HIGH VENT RATE EPISODES SINCE 4/29/2020  LONGEST 2 SECONDS   V PACED 94%  OPTIVOL WNL

## 2021-06-02 ENCOUNTER — HOSPITAL ENCOUNTER (OUTPATIENT)
Dept: PHARMACY | Age: 86
Setting detail: THERAPIES SERIES
Discharge: HOME | End: 2021-06-02
Payer: MEDICARE

## 2021-06-02 DIAGNOSIS — I48.19 PERSISTENT ATRIAL FIBRILLATION (HCC): Primary | ICD-10-CM

## 2021-06-02 DIAGNOSIS — Z51.81 ENCOUNTER FOR THERAPEUTIC DRUG MONITORING: ICD-10-CM

## 2021-06-02 DIAGNOSIS — Z79.01 LONG TERM (CURRENT) USE OF ANTICOAGULANTS: ICD-10-CM

## 2021-06-02 LAB — POC INR: 2.1 (ref 0.8–1.2)

## 2021-06-02 PROCEDURE — 99211 OFF/OP EST MAY X REQ PHY/QHP: CPT | Performed by: PHARMACIST

## 2021-06-02 PROCEDURE — 85610 PROTHROMBIN TIME: CPT | Performed by: PHARMACIST

## 2021-06-02 PROCEDURE — 36416 COLLJ CAPILLARY BLOOD SPEC: CPT | Performed by: PHARMACIST

## 2021-06-02 NOTE — PROGRESS NOTES
Medication Management [address]  [phone] (phone)  [phone] (fax)    Mr. Nichole Turner is a 80 y.o.  male with history of Afib who presents today for anticoagulation monitoring and adjustment. Patient verifies current dosing regimen and tablet strength. No missed or extra doses. Patient denies s/s bleeding/bruising/swelling/SOB/chest pain +SOB with exertion  No blood in urine or stool. No dietary changes. No changes in medication/OTC agents/Herbals. No change in alcohol use or tobacco use. No change in activity level. Patient denies headaches/dizziness/lightheadedness/falls. No vomiting/diarrhea or acute illness. No Procedures scheduled in the future at this time. Assessment:   Lab Results   Component Value Date    INR 2.10 (H) 06/02/2021    INR 3.30 (H) 05/05/2021    INR 3.30 (H) 04/07/2021     INR therapeutic   Recent Labs     06/02/21  0903   INR 2.10*         Plan:  Continue Coumadin 7.5mg W, 5mg MTThFSS. Recheck INR in 4 week(s). Patient reminded to call the Anticoagulation Clinic with any signs or symptoms of bleeding or with any medication changes. Patient given instructions utilizing the teach back method. After visit summary printed and reviewed with patient. Discharged ambulatory in no apparent distress.         For Pharmacy Admin Tracking Only     Total # of Interventions Recommended: 0   Total # of Interventions Accepted: 0   Time Spent (min): 20

## 2021-06-08 ENCOUNTER — PROCEDURE VISIT (OUTPATIENT)
Dept: CARDIOLOGY CLINIC | Age: 86
End: 2021-06-08
Payer: MEDICARE

## 2021-06-08 DIAGNOSIS — I50.22 CHRONIC SYSTOLIC CHF (CONGESTIVE HEART FAILURE) (HCC): Primary | ICD-10-CM

## 2021-06-08 PROCEDURE — 93297 REM INTERROG DEV EVAL ICPMS: CPT | Performed by: INTERNAL MEDICINE

## 2021-06-08 PROCEDURE — G2066 INTER DEVC REMOTE 30D: HCPCS | Performed by: INTERNAL MEDICINE

## 2021-06-30 ENCOUNTER — HOSPITAL ENCOUNTER (OUTPATIENT)
Dept: PHARMACY | Age: 86
Setting detail: THERAPIES SERIES
Discharge: HOME | End: 2021-06-30
Payer: MEDICARE

## 2021-06-30 DIAGNOSIS — Z79.01 LONG TERM (CURRENT) USE OF ANTICOAGULANTS: ICD-10-CM

## 2021-06-30 DIAGNOSIS — Z51.81 ENCOUNTER FOR THERAPEUTIC DRUG MONITORING: ICD-10-CM

## 2021-06-30 DIAGNOSIS — I48.19 PERSISTENT ATRIAL FIBRILLATION (HCC): Primary | ICD-10-CM

## 2021-06-30 LAB — POC INR: 1.9 (ref 0.8–1.2)

## 2021-06-30 PROCEDURE — 99211 OFF/OP EST MAY X REQ PHY/QHP: CPT

## 2021-06-30 PROCEDURE — 85610 PROTHROMBIN TIME: CPT

## 2021-06-30 PROCEDURE — 36416 COLLJ CAPILLARY BLOOD SPEC: CPT

## 2021-06-30 NOTE — PROGRESS NOTES
Medication Management [address]  [phone] (phone)  [phone] (fax)    Mr. Yeny Lugo is a 80 y.o.  male with history of Afib who presents today for anticoagulation monitoring and adjustment. Patient verifies current dosing regimen and tablet strength. No missed or extra doses. Patient denies s/s bleeding/bruising/swelling/SOB/chest pain  No blood in urine or stool. No dietary changes. No changes in medication/OTC agents/Herbals. No change in alcohol use or tobacco use. No change in activity level. Patient denies headaches/dizziness/lightheadedness/falls. No vomiting/diarrhea or acute illness. No Procedures scheduled in the future at this time. Assessment:   Lab Results   Component Value Date    INR 1.90 (H) 06/30/2021    INR 2.10 (H) 06/02/2021    INR 3.30 (H) 05/05/2021     INR subtherapeutic   Recent Labs     06/30/21  0914   INR 1.90*     Plan:  Continue Coumadin 7.5 mg on Wednesdays, 5 mg all other days. Recheck INR in 4 week(s). Patient reminded to call the Anticoagulation Clinic with any signs or symptoms of bleeding or with any medication changes. Patient given instructions utilizing the teach back method. After visit summary printed and reviewed with patient. Discharged ambulatory in no apparent distress.     For Pharmacy Admin Tracking Only   Time Spent (min): [address], PharmD, BCPS  6/30/2021  10:19 AM

## 2021-07-13 ENCOUNTER — PROCEDURE VISIT (OUTPATIENT)
Dept: CARDIOLOGY CLINIC | Age: 86
End: 2021-07-13
Payer: MEDICARE

## 2021-07-13 ENCOUNTER — TELEPHONE (OUTPATIENT)
Dept: CARDIOLOGY CLINIC | Age: 86
End: 2021-07-13

## 2021-07-13 DIAGNOSIS — I50.22 CHRONIC SYSTOLIC CHF (CONGESTIVE HEART FAILURE) (HCC): Primary | ICD-10-CM

## 2021-07-13 PROCEDURE — 93298 REM INTERROG DEV EVAL SCRMS: CPT | Performed by: NUCLEAR MEDICINE

## 2021-07-13 PROCEDURE — 93297 REM INTERROG DEV EVAL ICPMS: CPT | Performed by: NUCLEAR MEDICINE

## 2021-07-13 PROCEDURE — G2066 INTER DEVC REMOTE 30D: HCPCS | Performed by: NUCLEAR MEDICINE

## 2021-07-13 NOTE — TELEPHONE ENCOUNTER
[facility] Medtronic Single ICD Optivol  Pt of Sy [place] -- sent for review -- called office and LMOM     Battery 6.3 years    Optivol WNL    Episodes:   2 runs NS VT longest 1 second max rate 167

## 2021-07-28 ENCOUNTER — HOSPITAL ENCOUNTER (OUTPATIENT)
Dept: PHARMACY | Age: 86
Setting detail: THERAPIES SERIES
Discharge: HOME | End: 2021-07-28
Payer: MEDICARE

## 2021-07-28 DIAGNOSIS — Z51.81 ENCOUNTER FOR THERAPEUTIC DRUG MONITORING: ICD-10-CM

## 2021-07-28 DIAGNOSIS — Z79.01 LONG TERM (CURRENT) USE OF ANTICOAGULANTS: ICD-10-CM

## 2021-07-28 DIAGNOSIS — I48.19 PERSISTENT ATRIAL FIBRILLATION (HCC): Primary | ICD-10-CM

## 2021-07-28 LAB — POC INR: 2 (ref 0.8–1.2)

## 2021-07-28 PROCEDURE — 36416 COLLJ CAPILLARY BLOOD SPEC: CPT

## 2021-07-28 PROCEDURE — 85610 PROTHROMBIN TIME: CPT

## 2021-07-28 PROCEDURE — 99211 OFF/OP EST MAY X REQ PHY/QHP: CPT

## 2021-07-28 NOTE — PROGRESS NOTES
Medication Management [address]   [phone] (phone)  [phone] (fax)    Mr. Sangeetha Dee is a 80 y.o.  male with history of Afib who presents today for anticoagulation monitoring and adjustment. Patient verifies current dosing regimen and tablet strength. No missed or extra doses. Patient denies s/s bleeding/bruising/swelling/SOB/chest pain  No blood in urine or stool. No dietary changes. No changes in medication/OTC agents/Herbals. No change in alcohol use or tobacco use. No change in activity level. Patient denies headaches/dizziness/lightheadedness/falls. No vomiting/diarrhea or acute illness. - reports to diarrhea every once in awhile  No Procedures scheduled in the future at this time. Assessment:   Lab Results   Component Value Date    INR 2.00 (H) 07/28/2021    INR 1.90 (H) 06/30/2021    INR 2.10 (H) 06/02/2021     INR therapeutic   Recent Labs     07/28/21  0907   INR 2.00*         Plan:  Continue Coumadin 7.5 mg on Wednesdays and 5 mg all other days. Recheck INR in 4 week(s). Patient reminded to call the Anticoagulation Clinic with any signs or symptoms of bleeding or with any medication changes. Patient given instructions utilizing the teach back method. After visit summary printed and reviewed with patient. Discharged ambulatory in no apparent distress.     For Pharmacy Admin Tracking Only   Time Spent (min): [address], PharmD, BCPS  7/28/2021  9:18 AM 

## 2021-08-08 DIAGNOSIS — I48.21 PERMANENT ATRIAL FIBRILLATION (HCC): ICD-10-CM

## 2021-08-09 RX ORDER — SACUBITRIL AND VALSARTAN 24; 26 MG/1; MG/1
TABLET, FILM COATED ORAL
Qty: 90 TABLET | Refills: 0 | Status: SHIPPED | OUTPATIENT
Start: 2021-08-09 | End: 2022-01-01 | Stop reason: SDUPTHER

## 2021-08-19 ENCOUNTER — OFFICE VISIT (OUTPATIENT)
Dept: INTERNAL MEDICINE CLINIC | Age: 86
End: 2021-08-19
Payer: MEDICARE

## 2021-08-19 VITALS
DIASTOLIC BLOOD PRESSURE: 74 MMHG | BODY MASS INDEX: 27.43 KG/M2 | HEART RATE: 115 BPM | HEIGHT: 68 IN | TEMPERATURE: 97.4 F | WEIGHT: 181 LBS | SYSTOLIC BLOOD PRESSURE: 132 MMHG

## 2021-08-19 DIAGNOSIS — F41.9 ANXIETY: ICD-10-CM

## 2021-08-19 DIAGNOSIS — Z95.2 S/P AVR (AORTIC VALVE REPLACEMENT): ICD-10-CM

## 2021-08-19 DIAGNOSIS — Z95.810 ICD (IMPLANTABLE CARDIOVERTER-DEFIBRILLATOR) IN PLACE: ICD-10-CM

## 2021-08-19 DIAGNOSIS — I48.21 PERMANENT ATRIAL FIBRILLATION (HCC): ICD-10-CM

## 2021-08-19 DIAGNOSIS — Z87.39 H/O: GOUT: ICD-10-CM

## 2021-08-19 DIAGNOSIS — E78.5 HYPERLIPIDEMIA, UNSPECIFIED HYPERLIPIDEMIA TYPE: ICD-10-CM

## 2021-08-19 DIAGNOSIS — G31.84 MILD COGNITIVE IMPAIRMENT: ICD-10-CM

## 2021-08-19 DIAGNOSIS — Z95.1 S/P CABG (CORONARY ARTERY BYPASS GRAFT): ICD-10-CM

## 2021-08-19 DIAGNOSIS — I50.22 CHRONIC SYSTOLIC CONGESTIVE HEART FAILURE (HCC): Primary | ICD-10-CM

## 2021-08-19 DIAGNOSIS — I10 ESSENTIAL HYPERTENSION: ICD-10-CM

## 2021-08-19 DIAGNOSIS — R41.3 MEMORY LOSS: ICD-10-CM

## 2021-08-19 PROCEDURE — 4040F PNEUMOC VAC/ADMIN/RCVD: CPT | Performed by: INTERNAL MEDICINE

## 2021-08-19 PROCEDURE — 99214 OFFICE O/P EST MOD 30 MIN: CPT | Performed by: INTERNAL MEDICINE

## 2021-08-19 PROCEDURE — G8417 CALC BMI ABV UP PARAM F/U: HCPCS | Performed by: INTERNAL MEDICINE

## 2021-08-19 PROCEDURE — 1123F ACP DISCUSS/DSCN MKR DOCD: CPT | Performed by: INTERNAL MEDICINE

## 2021-08-19 PROCEDURE — 1036F TOBACCO NON-USER: CPT | Performed by: INTERNAL MEDICINE

## 2021-08-19 PROCEDURE — G8428 CUR MEDS NOT DOCUMENT: HCPCS | Performed by: INTERNAL MEDICINE

## 2021-08-19 RX ORDER — ALLOPURINOL 100 MG/1
TABLET ORAL
Qty: 90 TABLET | Refills: 3 | Status: SHIPPED | OUTPATIENT
Start: 2021-08-19 | End: 2022-01-01 | Stop reason: SDUPTHER

## 2021-08-19 RX ORDER — CARVEDILOL 6.25 MG/1
6.25 TABLET ORAL 2 TIMES DAILY
Qty: 180 TABLET | Refills: 3 | Status: SHIPPED | OUTPATIENT
Start: 2021-08-19 | End: 2022-01-01 | Stop reason: SDUPTHER

## 2021-08-19 RX ORDER — ATORVASTATIN CALCIUM 20 MG/1
TABLET, FILM COATED ORAL
Qty: 90 TABLET | Refills: 2 | Status: SHIPPED | OUTPATIENT
Start: 2021-08-19 | End: 2022-01-01 | Stop reason: SDUPTHER

## 2021-08-19 RX ORDER — WARFARIN SODIUM 5 MG/1
TABLET ORAL
Qty: 135 TABLET | Refills: 3 | Status: ON HOLD | OUTPATIENT
Start: 2021-08-19 | End: 2021-01-01 | Stop reason: SDUPTHER

## 2021-08-19 RX ORDER — FUROSEMIDE 40 MG/1
TABLET ORAL
Qty: 90 TABLET | Refills: 3 | Status: SHIPPED | OUTPATIENT
Start: 2021-08-19 | End: 2022-01-01 | Stop reason: SDUPTHER

## 2021-08-19 RX ORDER — DIGOXIN 250 MCG
250 TABLET ORAL DAILY
Qty: 90 TABLET | Refills: 3 | Status: SHIPPED | OUTPATIENT
Start: 2021-08-19 | End: 2022-01-01 | Stop reason: SDUPTHER

## 2021-08-19 RX ORDER — DONEPEZIL HYDROCHLORIDE 5 MG/1
TABLET, FILM COATED ORAL
Qty: 90 TABLET | Refills: 5 | Status: SHIPPED | OUTPATIENT
Start: 2021-08-19 | End: 2022-01-01

## 2021-08-19 NOTE — PROGRESS NOTES
Chief Complaint   Patient presents with    Congestive Heart Failure     4 MONTH FU     Atrial Fibrillation    Hypertension    Hyperlipidemia       Patient presents for  evaluation of dizziness. I last saw the patient 4 weeks   ago. Patient is followed on a regular basis by Dr. Melani Ambrose  Patient has not had any admissions to the hospital or change in medications since the last visit here. Pertinent past history reviewed and summarized below    He has an ICD with EF 25%   He has permanent atrial fibrillation, on warfarin. He had CABG, MR repair 2008  I had the lower Entresto dose as he was hypotensive and dizzy  His son is with him today  He said they are considering assisted living    He was admitted in October 2017 with chest pain. Dr. Christina Small did a cardiac catheterization which did show some CAD, not really amenable to intervention. Echo showed EF up to 50%. He is managed medically. He has done well  e is doing well. Denies weight gain. No orthopnea or PND.   He says he no longer sees Dr. Christina Small  He was seeing Dr. Harry Etienne  He had battery replacement for his ICD on 4/22 with Dr. Harry Etienne    He is here with his son  Patient Active Problem List   Diagnosis    CAD (coronary artery disease)    Aortic stenosis    SSS (sick sinus syndrome) ([address]    Hyperlipidemia    Kaposi's sarcoma ([address]    S/P CABG (coronary artery bypass graft)    Medtronic single ICD    GERD (gastroesophageal reflux disease)    Hyperuricemia    HTN (hypertension)    S/P MVR (mitral valve repair), Physio ring, 2008    Persistent atrial fibrillation ([address]    Pacemaker    Dysphagia    Hoarseness    Laryngeal disorder    S/P AVR (aortic valve replacement)    Anticoagulated on Coumadin    Mild cognitive impairment    Chronic systolic CHF (congestive heart failure) ([address]    ICD (implantable cardioverter-defibrillator) battery depletion    Long term (current) use of anticoagulants    Encounter for therapeutic drug monitoring Current Outpatient Medications   Medication Sig Dispense Refill    LORazepam (ATIVAN) 1 MG tablet Take 1 tablet by mouth daily for 30 days. 30 tablet 2    warfarin (COUMADIN) 5 MG tablet Take as directed by [address] [place] [address] [facility]. 135 tablets = 90 days 135 tablet 3    furosemide (LASIX) 40 MG tablet TAKE 1 TABLET BY MOUTH DAILY 90 tablet 3    donepezil (ARICEPT) 5 MG tablet TAKE 1 TABLET BY MOUTH EVERY NIGHT 90 tablet 5    atorvastatin (LIPITOR) 20 MG tablet TAKE 1 TABLET BY MOUTH ONE TIME A DAY AT NIGHT 90 tablet 2    allopurinol (ZYLOPRIM) 100 MG tablet TAKE 1 TABLET BY MOUTH EVERY MORNING 90 tablet 3    digoxin (DIGOX) 250 MCG tablet Take 1 tablet by mouth daily 90 tablet 3    carvedilol (COREG) 6.25 MG tablet Take 1 tablet by mouth 2 times daily 180 tablet 3    ENTRESTO 24-26 MG per tablet TAKE 1 TABLET BY MOUTH TWO TIMES A DAY  90 tablet 0    Melatonin 2.5 MG CHEW Take 1 tablet by mouth nightly      loperamide (IMODIUM) 2 MG capsule Take 2 mg by mouth See Admin Instructions Indications: Diarrhea      Multiple Vitamins-Minerals (THERAPEUTIC MULTIVITAMIN-MINERALS) tablet Take 1 tablet by mouth 2 times daily Indications: Nutritional Support Noon and Supper      acetaminophen (TYLENOL) 325 MG tablet Take 650 mg by mouth every 6 hours as needed for Pain or Fever Indications: Pain Don't take more than 3,000 mg per day      Calcium Carbonate-Vitamin D (CALCIUM + D PO) Take 1 tablet by mouth 2 times daily Indications: Treatment to Prevent Vitamin Deficiency Noon and Supper      aspirin 81 MG EC tablet Take 81 mg by mouth nightly Indications: Anticoagulant Therapy With food. No current facility-administered medications for this visit. No Known Allergies    Review of Systems - General ROS: negative  Psychological ROS: negative  Hematological and Lymphatic ROS: No history of blood clots or bleeding disorder.    Respiratory ROS:noo shortness of breath, or wheezing  Cardiovascular ROS: he does have dyspnea on exertion, usually about one block  Gastrointestinal ROS: no abdominal pain, change in bowel habits, or black or bloody stools  Genito-Urinary ROS: no dysuria, hesitancy, nocturia present  Musculoskeletal ROS:  negative  Neurological ROS: no TIA or stroke symptoms, patient says he does have some memory issues  Dermatological ROS: negative    Blood pressure 132/74, pulse 115, temperature 97.4 °F (36.3 °C), height 5' 8\" (1.727 m), weight 181 lb (82.1 kg). Body mass index is 27.52 kg/m². Physical Examination: General appearance - alert, well appearing, and in no distress  Mental status - alert, oriented to person, place, and time  Neck - Neck is supple, no JVD or carotid bruits. No thyromegaly or adenopathy. Chest - clear to auscultation, no wheezes, rales or rhonchi, symmetric air entry  Heart - normal rate, regular rhythm, normal S1, S2, loud 3/6 systolic murmur over the entire precordium, no rubs, clicks or gallops  Abdomen - soft, nontender, nondistended, no masses or organomegaly  Neurological - alert, oriented, normal speech, no focal findings or movement disorder noted  Extremities - peripheral pulses normal, no pedal edema, no clubbing or cyanosis  Skin - normal coloration and turgor, no rashes, no suspicious skin lesions noted    No orders of the defined types were placed in this encounter. Outpatient Encounter Medications as of 8/19/2021   Medication Sig Dispense Refill    LORazepam (ATIVAN) 1 MG tablet Take 1 tablet by mouth daily for 30 days. 30 tablet 2    warfarin (COUMADIN) 5 MG tablet Take as directed by [facility].  135 tablets = 90 days 135 tablet 3    furosemide (LASIX) 40 MG tablet TAKE 1 TABLET BY MOUTH DAILY 90 tablet 3    donepezil (ARICEPT) 5 MG tablet TAKE 1 TABLET BY MOUTH EVERY NIGHT 90 tablet 5    atorvastatin (LIPITOR) 20 MG tablet TAKE 1 TABLET BY MOUTH ONE TIME A DAY AT NIGHT 90 tablet 2    allopurinol (ZYLOPRIM) 100 MG tablet TAKE 1 TABLET BY MOUTH EVERY MORNING 90 tablet 3    digoxin (DIGOX) 250 MCG tablet Take 1 tablet by mouth daily 90 tablet 3    carvedilol (COREG) 6.25 MG tablet Take 1 tablet by mouth 2 times daily 180 tablet 3    ENTRESTO 24-26 MG per tablet TAKE 1 TABLET BY MOUTH TWO TIMES A DAY  90 tablet 0    [DISCONTINUED] warfarin (COUMADIN) 5 MG tablet Take as directed by [facility]. 135 tablets = 90 days 135 tablet 3    [DISCONTINUED] furosemide (LASIX) 40 MG tablet TAKE 1 TABLET BY MOUTH DAILY 90 tablet 3    [DISCONTINUED] donepezil (ARICEPT) 5 MG tablet TAKE 1 TABLET BY MOUTH EVERY NIGHT 90 tablet 5    [DISCONTINUED] digoxin (DIGOX) 250 MCG tablet Take 1 tablet by mouth daily 90 tablet 3    [DISCONTINUED] atorvastatin (LIPITOR) 20 MG tablet TAKE 1 TABLET BY MOUTH ONE TIME A DAY AT NIGHT 90 tablet 2    [DISCONTINUED] allopurinol (ZYLOPRIM) 100 MG tablet TAKE 1 TABLET BY MOUTH EVERY MORNING 90 tablet 3    [DISCONTINUED] carvedilol (COREG) 6.25 MG tablet Take 1 tablet by mouth 2 times daily 180 tablet 3    Melatonin 2.5 MG CHEW Take 1 tablet by mouth nightly      loperamide (IMODIUM) 2 MG capsule Take 2 mg by mouth See Admin Instructions Indications: Diarrhea      Multiple Vitamins-Minerals (THERAPEUTIC MULTIVITAMIN-MINERALS) tablet Take 1 tablet by mouth 2 times daily Indications: Nutritional Support Noon and Supper      acetaminophen (TYLENOL) 325 MG tablet Take 650 mg by mouth every 6 hours as needed for Pain or Fever Indications: Pain Don't take more than 3,000 mg per day      Calcium Carbonate-Vitamin D (CALCIUM + D PO) Take 1 tablet by mouth 2 times daily Indications: Treatment to Prevent Vitamin Deficiency Noon and Supper      aspirin 81 MG EC tablet Take 81 mg by mouth nightly Indications: Anticoagulant Therapy With food. No facility-administered encounter medications on file as of 8/19/2021. DIAGNOSES   Diagnosis Orders   1.  Chronic systolic congestive heart failure (HCC)  furosemide (LASIX) 40 MG tablet    digoxin (DIGOX) 250 MCG tablet    carvedilol (COREG) 6.25 MG tablet   2. Permanent atrial fibrillation (HCC)  warfarin (COUMADIN) 5 MG tablet   3. Mild cognitive impairment  donepezil (ARICEPT) 5 MG tablet   4. Hyperlipidemia, unspecified hyperlipidemia type  atorvastatin (LIPITOR) 20 MG tablet   5. H/O: gout  allopurinol (ZYLOPRIM) 100 MG tablet   6. Anxiety  LORazepam (ATIVAN) 1 MG tablet   7. Essential hypertension     8. S/P CABG (coronary artery bypass graft)     9. S/P AVR (aortic valve replacement)     10. ICD (implantable cardioverter-defibrillator) in place     11.  Memory loss       I think he does have some baseline dementia  He does not know the year  He cannot spell world backwards      I will refill l Ativan 1 mg at bedtime it helps him sleep   He does have permanent atrial fibrillation on Coumadin  He is having no bleeding      Follow-up 4 months

## 2021-08-22 RX ORDER — LORAZEPAM 1 MG/1
1 TABLET ORAL DAILY
Qty: 30 TABLET | Refills: 2 | Status: SHIPPED | OUTPATIENT
Start: 2021-08-22 | End: 2021-09-21

## 2021-08-24 ENCOUNTER — PROCEDURE VISIT (OUTPATIENT)
Dept: CARDIOLOGY CLINIC | Age: 86
End: 2021-08-24
Payer: MEDICARE

## 2021-08-24 DIAGNOSIS — Z95.810 AUTOMATIC IMPLANTABLE CARDIOVERTER-DEFIBRILLATOR IN SITU: Primary | ICD-10-CM

## 2021-08-24 PROCEDURE — 93294 REM INTERROG EVL PM/LDLS PM: CPT | Performed by: INTERNAL MEDICINE

## 2021-08-24 PROCEDURE — 93296 REM INTERROG EVL PM/IDS: CPT | Performed by: INTERNAL MEDICINE

## 2021-08-24 NOTE — PROGRESS NOTES
[facility]   Pt of [place]    Battery 6.3 years    Presenting rhythm     RV impedance 361    RV shock 40  SVC shock 49    RV wave 8.9    V threshold 1.375 @ 0.40  V amplitude 3.0 @ 0.40    Programmed mode VVIR     V paced 95.6%     AFib burden 0%     Episodes   4 runs NS VT -- 1- 3 seconds - rates [id number]    Optivol WNL

## 2021-08-25 ENCOUNTER — HOSPITAL ENCOUNTER (OUTPATIENT)
Dept: PHARMACY | Age: 86
Setting detail: THERAPIES SERIES
Discharge: HOME | End: 2021-08-25
Payer: MEDICARE

## 2021-08-25 DIAGNOSIS — I48.19 PERSISTENT ATRIAL FIBRILLATION (HCC): ICD-10-CM

## 2021-08-25 DIAGNOSIS — Z79.01 LONG TERM (CURRENT) USE OF ANTICOAGULANTS: ICD-10-CM

## 2021-08-25 DIAGNOSIS — Z51.81 ENCOUNTER FOR THERAPEUTIC DRUG MONITORING: Primary | ICD-10-CM

## 2021-08-25 LAB
HCT VFR BLD CALC: 33.3 % (ref 39–49)
HEMOGLOBIN: 10.8 G/DL (ref 13–17)
POC INR: 2.1 (ref 0.8–1.2)

## 2021-08-25 PROCEDURE — 36416 COLLJ CAPILLARY BLOOD SPEC: CPT

## 2021-08-25 PROCEDURE — 85610 PROTHROMBIN TIME: CPT

## 2021-08-25 PROCEDURE — 99211 OFF/OP EST MAY X REQ PHY/QHP: CPT

## 2021-08-25 NOTE — PROGRESS NOTES
Medication Management [address]   [phone] (phone)  [phone] (fax)    Mr. Stephanie Anderson is a 80 y.o.  male with history of Afib who presents today for anticoagulation monitoring and adjustment. Patient verifies current dosing regimen and tablet strength. No missed or extra doses. Patient denies s/s bleeding/bruising/swelling/SOB/chest pain. No blood in urine or stool. No dietary changes. No changes in medication/OTC agents/Herbals. No change in alcohol use or tobacco use. No change in activity level. Patient denies headaches/dizziness/lightheadedness/falls. No vomiting/diarrhea or acute illness. No Procedures scheduled in the future at this time. Assessment:   Lab Results   Component Value Date    INR 2.00 (H) 07/28/2021    INR 1.90 (H) 06/30/2021    INR 2.10 (H) 06/02/2021     INR therapeutic No results for input(s): INR in the last 72 hours. H&H labs given - review at next visit    Plan:  Continue Coumadin 7.5mg W and 5mg [id number]. Recheck INR in 4 week(s). Patient reminded to call the Anticoagulation Clinic with any signs or symptoms of bleeding or with any medication changes. Patient given instructions utilizing the teach back method. After visit summary printed and reviewed with patient and son, Andrés Pereira. Discharged ambulatory in no apparent distress.      Leonie Yarbrough, PharmD    For Pharmacy Admin Tracking Only     Time Spent (min): 20

## 2021-09-22 ENCOUNTER — HOSPITAL ENCOUNTER (OUTPATIENT)
Dept: PHARMACY | Age: 86
Setting detail: THERAPIES SERIES
Discharge: HOME | End: 2021-09-22
Payer: MEDICARE

## 2021-09-22 DIAGNOSIS — I48.19 PERSISTENT ATRIAL FIBRILLATION (HCC): Primary | ICD-10-CM

## 2021-09-22 DIAGNOSIS — Z79.01 LONG TERM (CURRENT) USE OF ANTICOAGULANTS: ICD-10-CM

## 2021-09-22 DIAGNOSIS — Z51.81 ENCOUNTER FOR THERAPEUTIC DRUG MONITORING: ICD-10-CM

## 2021-09-22 LAB — POC INR: 1.9 (ref 0.8–1.2)

## 2021-09-22 PROCEDURE — 85610 PROTHROMBIN TIME: CPT | Performed by: PHARMACIST

## 2021-09-22 PROCEDURE — 36416 COLLJ CAPILLARY BLOOD SPEC: CPT | Performed by: PHARMACIST

## 2021-09-22 PROCEDURE — 99211 OFF/OP EST MAY X REQ PHY/QHP: CPT | Performed by: PHARMACIST

## 2021-09-22 NOTE — PROGRESS NOTES
Medication Management [address]  [phone] (phone)  [phone] (fax)    Mr. Lucinda Lomeli is a 80 y.o.  male with history of Afib who presents today for anticoagulation monitoring and adjustment. Patient verifies current dosing regimen and tablet strength. No missed or extra doses. Patient denies s/s bleeding/bruising/swelling/SOB/chest pain +SOB at times  No blood in urine or stool. No dietary changes. No changes in medication/OTC agents/Herbals. No change in alcohol use or tobacco use. No change in activity level. Patient denies headaches/dizziness/lightheadedness/falls. No vomiting/diarrhea or acute illness. No Procedures scheduled in the future at this time. Assessment:   Lab Results   Component Value Date    INR 1.90 (H) 09/22/2021    INR 2.10 (H) 08/25/2021    INR 2.00 (H) 07/28/2021     INR subtherapeutic   Recent Labs     09/22/21  0912   INR 1.90*        8/25/2021 09:27   Hemoglobin Quant 10.8 (L)   Hematocrit 33.3 (L)     H/H stable    Plan:  7.5mg x1, 5mg x1, 7.5mg x1  then continue Coumadin 7.5mg W, 5mg MTThFSS. Recheck INR in 4 week(s). Patient reminded to call the Anticoagulation Clinic with any signs or symptoms of bleeding or with any medication changes. Patient given instructions utilizing the teach back method. After visit summary printed and reviewed with patient. Discharged ambulatory in no apparent distress.       For Pharmacy Admin Tracking Only     Intervention Detail: Dose Adjustment: 1, reason: Therapy Optimization   Total # of Interventions Recommended: 1   Total # of Interventions Accepted: 1   Time Spent (min): 20

## 2021-09-28 NOTE — PROGRESS NOTES
DR Elenita Dickinson PT / KNOWN AFIB/ COUMADIN  AFIB BURDEN 0%    [employer] OPTIVOL REMOTE  BATTERY 6.3 YRS REMAINING      LOOKS LIKE 2 EPISODES OF NS VT LONGEST 3 SECONDS   OPTIVOL WNL normal...

## 2021-10-20 NOTE — PROGRESS NOTES
Medication Management [address]   [phone] (phone)  [phone] (fax)    Mr. Blair Candelaria is a 80 y.o.  male with history of persistent atrial fib. , per Dr. Morales [facility] referral, who presents today for Warfarin monitoring and adjustment (4 week visit). Son verifies current dosing regimen and tablet strength. Hasn't taken yet today. No missed or extra doses, except for bolus ordered last visit. Patient denies bleeding/swelling/chest pain. Has usual easy bruising/intermittent SOB. No blood in urine or stool. No dietary changes. Denies having green tea, V8 juice, Boost/Ensure/Glucerna. No changes in medication/OTC agents/herbals. No change in alcohol use or tobacco use. No change in activity level. Patient denies headaches/dizziness/lightheadedness/falls. No vomiting/diarrhea or acute illness. No procedures scheduled in the future at this time. Plans to get flu vaccine at pharmacy today. Assessment:   Lab Results   Component Value Date    INR 1.70 (H) 10/20/2021    INR 1.90 (H) 09/22/2021    INR 2.10 (H) 08/25/2021     INR subtherapeutic - goal 2-3. Recent Labs     10/20/21  0901   INR 1.70*       Plan:  POCT INR ordered/performed/result reviewed. 10 mg today, W, then increase PO Coumadin to 7.5 mg MWF, 5 mg TThSS (from 7.5 mg W, 5 mg MTThFSS=13.3% increase). Recheck INR in 1.5-2 week(s). (Report given - orders entered by Shira Land, PharmD.) Patient reminded to call the Anticoagulation Clinic with any signs or symptoms of bleeding or with any medication changes. Patient given instructions utilizing the teach back method. After visit summary printed and reviewed with son. Discharged ambulatory in no apparent distress, wearing mask, with son.     For Pharmacy Admin Tracking Only     Intervention Detail: Dose Adjustment: 1, reason: Therapy Optimization   Total # of Interventions Recommended: 1   Total # of Interventions Accepted: 1   Time Spent (min): 5

## 2021-11-02 NOTE — PROGRESS NOTES
[facility] Med[employer] Single ICD Optivol  Pt of [place]    Battery 7.5 years    Optivol WNL    Episodes  4 runs NS VT rate [id number]

## 2021-11-03 NOTE — PROGRESS NOTES
Medication Management [address]   [phone] (phone)  [phone] (fax)    Mr. Ashish Wetzel is a 80 y.o.  male with history of Afib who presents today for anticoagulation monitoring and adjustment. Patient verifies current dosing regimen and tablet strength. No missed or extra doses. Patient denies s/s bleeding/bruising/swelling/SOB/chest pain  No blood in urine or stool. No dietary changes. No changes in medication/OTC agents/Herbals. No change in alcohol use or tobacco use. No change in activity level. Patient denies headaches/dizziness/lightheadedness/falls. No vomiting or acute illness. Patient gets occasional diarrhea. No Procedures scheduled in the future at this time. Assessment:   Lab Results   Component Value Date    INR 2.40 (H) 11/03/2021    INR 1.70 (H) 10/20/2021    INR 1.90 (H) 09/22/2021     INR therapeutic   Recent Labs     11/03/21  0853   INR 2.40*     Patient presents with first therapeutic INR following a recent dose adjustment. Plan:  Continue Coumadin 7.5 mg MWF and 5 mg TuThSaSu. Recheck INR in 2 week(s). Patient reminded to call the Anticoagulation Clinic with any signs or symptoms of bleeding or with any medication changes. Patient given instructions utilizing the teach back method. After visit summary printed and reviewed with patient. Discharged ambulatory in no apparent distress.     For Pharmacy Admin Tracking Only     Total # of Interventions Recommended: 0   Total # of Interventions Accepted: 0   Time Spent (min): 7686  [facility] Hospital[facility]afael, BCPS  11/3/2021  9:02 AM

## 2021-11-17 NOTE — PROGRESS NOTES
Medication Management [address]  [phone] (phone)  [phone] (fax)    Mr. Diogo Shea is a 80 y.o.  male with history of Afib who presents today for anticoagulation monitoring and adjustment. Patient verifies current dosing regimen and tablet strength. No missed or extra doses. Patient denies s/s bleeding/bruising/swelling/SOB/chest pain  No blood in urine or stool. No dietary changes. No changes in medication/OTC agents/Herbals. No change in alcohol use or tobacco use. No change in activity level. Patient denies headaches/dizziness/lightheadedness/falls. No vomiting or acute illness. Patient has baseline off/on diarrhea that is unchanged and relieved by Imodium. No Procedures scheduled in the future at this time. Assessment:   Lab Results   Component Value Date    INR 2.30 (H) 11/17/2021    INR 2.40 (H) 11/03/2021    INR 1.70 (H) 10/20/2021     INR therapeutic   Recent Labs     11/17/21  0904   INR 2.30*     Patient interview completed and discussed with pharmacist by Mina Brown PharmD Candidate    Patient presents with second consecutive therapeutic INR following a recent dose adjustment. Plan:  Continue Coumadin 7.5 mg MWF and 5 mg TuThSaSu. Recheck INR in 3 week(s). Patient reminded to call the Anticoagulation Clinic with any signs or symptoms of bleeding or with any medication changes. Patient given instructions utilizing the teach back method. After visit summary printed and reviewed with patient. Discharged ambulatory in no apparent distress.     For Pharmacy Admin Tracking Only     Total # of Interventions Recommended: 0   Total # of Interventions Accepted: 0   Time Spent (min): 2537  [facility] Hospital[facility], BCPS  11/17/2021  9:17 AM

## 2021-12-07 NOTE — PROGRESS NOTES
[facility]   [place] pt     7.4 years on device   imped 342  Shock 37  SVC 44  R waves 10.1  95.7% paced   Threshold 1.25 @ 0.4  optivol WNL

## 2021-12-08 NOTE — PROGRESS NOTES
Medication Management [address]  [phone] (phone)  [phone] (fax)    Mr. Gerardo Bustillo is a 80 y.o.  male with history of Afib who presents today for anticoagulation monitoring and adjustment. Patient presents with his son. Patient verifies current dosing regimen and tablet strength. No missed or extra doses. Patient denies s/s bleeding/bruising/swelling/SOB/chest pain  No blood in urine or stool. No dietary changes. No changes in medication/OTC agents/Herbals. No change in alcohol use or tobacco use. No change in activity level. Patient denies headaches/dizziness/lightheadedness/falls. No vomiting/diarrhea or acute illness. No Procedures scheduled in the future at this time. Assessment:   Lab Results   Component Value Date    INR 3.30 (H) 12/08/2021    INR 2.30 (H) 11/17/2021    INR 2.40 (H) 11/03/2021     INR supratherapeutic   Recent Labs     12/08/21  0907   INR 3.30*     Patient was previously stable on 37.5 mg/wk. Over the past week he had 42.5 mg/wk and presents supratherapeutic. Plan:  Coumadin 2.5 mg x 1 then decrease Coumadin 7.5 mg MF, 5 mg TWThSS (40 mg/wk). Recheck INR in 3 week(s). Patient reminded to call the Anticoagulation Clinic with signs or symptoms of bleeding or with any medication changes. Patient given instructions utilizing the teach back method. After visit summary printed and reviewed with patient. Discharged ambulatory in no apparent distress.         For Pharmacy Admin Tracking Only     Intervention Detail: Dose Adjustment: 1, reason: Therapy De-escalation   Total # of Interventions Recommended: 1   Total # of Interventions Accepted: 1   Time Spent (min): 2486 Jamal Minor PharmD, BCPS  12/8/2021  10:50 AM

## 2021-12-19 PROBLEM — R04.2 HEMOPTYSIS: Status: ACTIVE | Noted: 2021-01-01

## 2021-12-19 NOTE — ED NOTES
Pt appears to be resting comfortably on cot. Pt denies any pain. Pt denies any needs or concerns at this time. No distress noted. Respirations even and unlabored. Call light in reach.       Ellis Cho RN  12/19/21 2692

## 2021-12-19 NOTE — ED NOTES
Pt appears to be resting on cot. Pt denies any needs or concerns at this time. No distress noted. Respirations even and unlabored. Call light in reach. Family at bedside.       Jasvir Fleming RN  12/19/21 4115

## 2021-12-19 NOTE — ED NOTES
RN witnesses rectal exam at this time completed by Dr. Ada Vazquez. Patient tolerates well.      Leonid Tripathi RN  12/19/21 0314

## 2021-12-19 NOTE — ED NOTES
ED nurse-to-nurse bedside report    Chief Complaint   Patient presents with    Hematemesis    Dizziness      LOC: alert and orientated to name, place, date  Vital signs   Vitals:    12/19/21 1141 12/19/21 1253 12/19/21 1354 12/19/21 1510   BP:   116/65 113/77   Pulse: 77  79 72   Resp:  18 26 14   Temp:       SpO2: 95% 93% 100% 100%   Weight:       Height:          Pain:    Pain Interventions: pt denies pain at this time  Pain Goal: 0  Oxygen: No    Current needs required RA   Telemetry: Yes  LDAs:   Peripheral IV 12/19/21 Right Antecubital (Active)   Site Assessment Dry; Intact; Clean 12/19/21 0946   Line Status Blood return noted;Specimen collected; Flushed 12/19/21 0946   Dressing Status Clean;Dry; Intact 12/19/21 0946     Continuous Infusions:   Mobility: Requires assistance * 1  Pittman Fall Risk Score:    Fall Risk 4/22/2021 11/30/2020 5/14/2020 1/14/2019 11/20/2017 10/6/2016 10/6/2015   2 or more falls in past year? no no no no no no no   Fall with injury in past year? no no no no no no no     Fall Interventions: call light in reach  Report given to: NICO partida RN  12/19/21 6424

## 2021-12-19 NOTE — ED PROVIDER NOTES
[facility] [address] [address] EMERGENCY DEPT  Faculty Attestation    I performed a history and physical examination of the patient and discussed management with the resident. I reviewed the residents note and agree with the documented findings and plan of care. Any areas of disagreement are noted on the chart. I was personally present for the key portions of any procedures and the inclusive time noted in any critical care statement. I have documented in the chart those procedures where I was not present during the key portions. I have reviewed the emergency nurses triage note. I agree with the chief complaint, past medical history, past surgical history, allergies, medications, social, and family history as documented unless otherwise noted below. This is an 78-year-old male brought to the emergency department by family  Patient with recent lightheadedness and fatigue  Patient with recent hemoptysis  No abdominal pain  No nausea or vomiting  Notes possible blood in the stool  No genitourinary complaints  No headache  No fall or injury  No unilateral or focal sensory/motor deficits  They have no additional stroke concerns  No recent illness or fever  Patient is on Coumadin  Patient and family have no additional complaints  Review of systems otherwise negative    On examination he appears in no acute distress  Heart is regular in rate and rhythm  Lungs clear to auscultation  Normal work of breathing  Abdomen soft and nontender  Trace pretibial pitting edema  Skin is warm and dry  No sensory or motor deficits  Patient is awake and alert    EKG at 9:23 AM shows a paced ventricular rhythm. Widened QRS. Prolonged QTC. PVCs present. No inappropriate discordance or concordance. No apparent acute infarction or arrhythmia.     Patient mildly hypotensive on arrival  Patient has IV fluids running    X-ray chest reviewed  Additional labs and imaging ordered    Patient and family updated  Blood pressure improved to 102/62    Additional labs and imaging reviewed  Blood pressure stable  Covid negative  Patient will be admitted  Stable for floor    Critical care time of 15 minutes      Tiki Rojas DO  Attending Emergency Physician        Rom Bray DO  12/19/21 9546

## 2021-12-19 NOTE — ED NOTES
Pt appears to be resting comfortably on cot. Pt denies any pain. No distress noted. Pt denies any needs or concerns at this time. VSS.  Call light in place     Dex Sabillon RN  12/19/21 5904

## 2021-12-19 NOTE — H&P
History & Physical    Patient:  Mayra Hein  YOB: 1933  Date of Service: 12/19/2021  MRN: [id number]   Acct:  [de-identified]   Primary Care Physician: Marco Jarvis MD    Chief Complaint: Hemoptysis    History of Present Illness:   History obtained from the patient and children at bedside. The patient is a 80 y.o. male who presents with complaints of hemoptysis, worsening cough and fatigue. Patient has a history of dementia and is accompanied by his 3 children who all shared medical power of 's. His daughter reports that patient was having chills, appears slightly more confused and tired. This morning she checked up on him and states he showed her that he was coughing up blood. He has had at least 3 or 4 episodes today of hemoptysis. She states that he does have a history of atrial fibrillation and is on Coumadin. His INR levels are therapeutic and within range today however he did have supratherapeutic levels 2 weeks ago. Patient states that he feels fine and does not understand why he is in the hospital. He denies any chest pain, shortness of breath. In the ED patient had another episode of hemoptysis with bright red sputum on a tissue. He is saturating well on room air. He was noted to be hypotensive and given IV fluids.       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 Medications:   No current facility-administered medications on file prior to encounter. Current Outpatient Medications on File Prior to Encounter   Medication Sig Dispense Refill    LORazepam (ATIVAN) 1 MG tablet Take by mouth nightly.  warfarin (COUMADIN) 5 MG tablet Take as directed by [facility]. 135 tablets = 90 days 135 tablet 3    furosemide (LASIX) 40 MG tablet TAKE 1 TABLET BY MOUTH DAILY 90 tablet 3    donepezil (ARICEPT) 5 MG tablet TAKE 1 TABLET BY MOUTH EVERY NIGHT 90 tablet 5    atorvastatin (LIPITOR) 20 MG tablet TAKE 1 TABLET BY MOUTH ONE TIME A DAY AT NIGHT 90 tablet 2    allopurinol (ZYLOPRIM) 100 MG tablet TAKE 1 TABLET BY MOUTH EVERY MORNING 90 tablet 3    digoxin (DIGOX) 250 MCG tablet Take 1 tablet by mouth daily 90 tablet 3    carvedilol (COREG) 6.25 MG tablet Take 1 tablet by mouth 2 times daily 180 tablet 3    ENTRESTO 24-26 MG per tablet TAKE 1 TABLET BY MOUTH TWO TIMES A DAY  90 tablet 0    Melatonin 2.5 MG CHEW Take 1 tablet by mouth nightly      loperamide (IMODIUM) 2 MG capsule Take 2 mg by mouth as needed Indications: Diarrhea       Multiple Vitamins-Minerals (THERAPEUTIC MULTIVITAMIN-MINERALS) tablet Take 1 tablet by mouth 2 times daily Indications: Nutritional Support Noon and Supper      acetaminophen (TYLENOL) 325 MG tablet Take 650 mg by mouth every 6 hours as needed for Pain or Fever Indications: Pain Don't take more than 3,000 mg per day      Calcium Carbonate-Vitamin D (CALCIUM + D PO) Take 1 tablet by mouth 2 times daily Indications: Treatment to Prevent Vitamin Deficiency Noon and Supper      aspirin 81 MG EC tablet Take 81 mg by mouth nightly Indications: Anticoagulant Therapy With food. Allergies:  Patient has no known allergies. Social History:    reports that he quit smoking about 13 years ago. His smoking use included cigarettes and cigars. He has a 50.00 pack-year smoking history.  He has never used smokeless tobacco. He reports previous alcohol use. He reports that he does not use drugs. Family History:       Problem Relation Age of Onset    Heart Disease Mother     Heart Disease Father        Review of systems:  Constitutional: no fever, no night sweats, positive for chills and fatigue  Head: no headache, no head injury, no migranes. Eye: no blurring of vision, no double vision. Ears: no hearing difficulty, no tinnitus  Mouth/throat: no ulceration, dental caries, dysphagia  Lungs: cough and hemoptysis  CVS: no palpitation, no chest pain, no shortness of breath  GI: no abdominal pain, no nausea , no vomiting, no constipation  JAMMIE: no dysuria, frequency and urgency, no hematuria, no kidney stones  Musculoskeletal: no joint pain, swelling , stiffness  Endocrine: no polyuria, polydypsia, no cold or heat intolerence  Hematology: no anemia, no easy brusing or bleeding, no hx of clotting disorder  Dermatology: no skin rash, no eczema, no prurities,  Psychiatry: no depression, no anxiety,no panic attacks, no suicide ideation  Neurology: no syncope, no seizures, no numbness or tingling of hands, no numbness or tingling of feet, no paresis    10 point review of systems completed, all other than noted above are negative. Vitals:   Vitals:    12/19/21 1510   BP: 113/77   Pulse: 72   Resp: 14   Temp:    SpO2: 100%      BMI: Body mass index is 25.83 kg/m². Exam:  Physical Examination: General appearance - alert, awake appears to be in no acute distress  HEENT: Atraumatic normocephalic,no JVD, trachea is midline  Neck - supple, no significant adenopathy, no JVD, or carotid bruits  Chest - Bilateral air entry, no wheezes, crackles or rhonchi. Non tender to palpation of chest wall  Heart - S1S2 RRR, no murmurs or gallops  Abdomen - Soft, non tender non distended.   Normoactive bowel sounds  Neurological - II-XII grossly intact, no neurological deficits  Musculoskeletal - 5/5 power upper and lower extremities equal bilaterally. Full ROM of all limbs  Extremities - no edema, no cyanosis or clubbing  Skin - normal coloration and turgor, no rashes, no suspicious skin lesions noted      Review of Labs and Diagnostic Testing:  CBC:   Recent Labs     12/19/21  0955   WBC 11.3*   HGB 10.7*        BMP:    Recent Labs     12/19/21  0955      K 3.8      CO2 27   BUN 30*   CREATININE 1.3*   GLUCOSE 138*     Calcium:  Recent Labs     12/19/21  0955   CALCIUM 9.3     INR:   Recent Labs     12/19/21  0955   INR 2.73*     Hepatic:   Recent Labs     12/19/21  0955   ALKPHOS 141*   ALT 10*   AST 17   PROT 7.0   BILITOT 2.5*   LABALBU 4.0     Troponin:   Recent Labs     12/19/21  0955   TROPONINT 0.020*       Radiology:     CTA CHEST W WO CONTRAST    Result Date: 12/19/2021  PROCEDURE: CTA CHEST W WO CONTRAST CLINICAL INFORMATION: Hemoptysis, elevated troponin, further delinate chest x ray findings and evaluate for PE. COMPARISON: Chest x-ray dated 19 of December 2021. CT scan of the chest dated 22nd of March 2010. Anthonette Jose TECHNIQUE: 3 mm axial images were obtained through the chest after the administration of IV contrast.  A non-contrast localizer was obtained. 3D reconstructions were performed on the scanner to include MIP coronal and sagittal images through the chest. Isovue was the intravenous contrast utilized. All CT scans at this facility use dose modulation, iterative reconstruction, and/or weight-based dosing when appropriate to reduce radiation dose to as low as reasonably achievable. FINDINGS:  There is adequate opacification of the pulmonary arterial system. No pulmonary emboli are present. There is atherosclerotic calcification throughout the thoracic aorta. . There is cardiomegaly in this patient status post pacemaker placement, mitral valve and aortic valve replacement. There is pericardial calcification. There is increased attenuation in the coronary arteries.  . There is bilateral diaphragmatic and pleural calcification. There is diffuse COPD, thickening off the interstitial lung markings and areas of abnormal density especially in the left lower lobe, left upper lobe, right perihilar region and right lower lobe these findings are suggestive of inflammatory changes possibly secondary to Covid 19 infection, please correlate clinically. . There is thoracic spondylosis. There is a sclerotic density in approximately the T9 vertebral body. There is mild compression deformity involving the T12 vertebral body. There is possible splenomegaly. There is a small hiatal hernia. 1. No definite evidence pulmonary embolism. 2. Cardiomegaly status post pacemaker placement, mitral valve and aortic valve replacement. Increased attenuation in the coronary arteries. 3. There is pleural, pericardial and diaphragmatic calcification. 4. There is diffuse COPD, thickening of the interstitial lung markings and areas of abnormal density especially in the left lower lobe, right perihilar region and right lower lobe suggestive of inflammatory changes, please correlate clinically. 5. Thoracic spondylosis. Sclerotic density in approximately the T9 vertebral body. 6. Mild compression deformity involving approximately the T12 vertebral body. 7. Possible splenomegaly. 8. Small hiatal hernia. . **This report has been created using voice recognition software. It may contain minor errors which are inherent in voice recognition technology. ** Final report electronically signed by DR Jarocho Carrillo on 12/19/2021 12:24 PM    CT ABDOMEN PELVIS W IV CONTRAST Additional Contrast? None    Result Date: 12/19/2021  PROCEDURE: CT ABDOMEN PELVIS W IV CONTRAST CLINICAL INFORMATION: Abdominal pain, dark stool . COMPARISON: None. TECHNIQUE: Axial 5 mm CT images were obtained through the abdomen and pelvis after the administration of intravenous contrast. Coronal and sagittal reconstructions were obtained.  All CT scans at this facility use dose modulation, iterative reconstruction, and/or weight-based dosing when appropriate to reduce radiation dose to as low as reasonably achievable. FINDINGS:  There is bilateral diaphragmatic and pericardial calcification. There is a tiny calcified granuloma in the right lung base. There is left lower lobe infiltrate. .  There is cardiomegaly in this patient status post aortic valve and mitral valve replacement. There are  punctate calcifications in the liver and spleen. There are no other focal hepatic defects. . There is splenomegaly. There is free fluid surrounding the spleen The adrenals and pancreas are normal. There is a possible tiny gallstone. There is pericholecystic fluid. There are  small bilateral renal cysts. There is no evidence of stones or hydronephrosis. No renal masses are noted. There is ascites present  No abnormalities of the small bowel loops are noted. There is atherosclerotic calcification in the abdominal aorta and iliac and visceral arteries. .  There is a slightly thick-walled bladder. There is an enlarged prostate gland which measures 4.4 x 3.5 cm in size. . There is no pelvic free fluid. There is diverticulosis involving the colon. .  There is no adenopathy. There is lumbar spondylosis. Identified. 1. Splenomegaly. Punctate calcifications in the liver and spleen. Free fluid adjacent to the spleen. 2.. Small bilateral renal cysts. No evidence of renal stones or hydronephrosis. 3. Tiny gallstone. Small amount of pericholecystic fluid. 4. Atherosclerotic calcification in the abdominal aorta, iliac and visceral arteries. 5. Diverticulosis involving the colon. 6. Enlarged prostate gland. Slightly thick-walled bladder. 7. Lumbar spondylosis. 8. Cardiomegaly, status post aortic valve and mitral valve replacement. 9. Diaphragmatic, pleural and pericardial calcification **This report has been created using voice recognition software. It may contain minor errors which are inherent in voice recognition technology. ** Final report electronically signed by DR Mati Rivero on 12/19/2021 12:41 PM    XR CHEST PORTABLE    Result Date: 12/19/2021  PROCEDURE: XR CHEST PORTABLE CLINICAL INFORMATION: hemoptysis. COMPARISON: Chest x-ray dated 10 of October 2016. Etta Greenfield TECHNIQUE: AP upright view of the chest. FINDINGS: There is moderate cardiomegaly in this patient status post pacemaker placement, aortic and mitral valve replacement. . This atherosclerotic calcification aortic arch. .  There is abnormal density in both lung fields especially in the left and right perihilar region suggestive of inflammatory process. There is   calcification along the right hemidiaphragm and along the right pleural surface. There is increased density in the left retrocardiac space which could represent infiltrate and/or effusion. The pulmonary vascularity is slightly increased. There is thoracic spondylosis. 1. Significant interval deterioration since previous study dated 10 of October 2016. 2. Moderate cardiomegaly status post aortic valve replacement, mitral valve replacement and pacemaker placement. 3. Bilateral pulmonary opacification, largely new since previous study. 4. Abnormal density left retrocardiac space which could represent infiltrate and/pleural effusion, new. 5. Calcification along the right hemidiaphragm and along the pleural surfaces bilaterally possibly related to old asbestos exposure. 6. Thoracic spondylosis. Etta Greenfield **This report has been created using voice recognition software. It may contain minor errors which are inherent in voice recognition technology. ** Final report electronically signed by DR Mati Rivero on 12/19/2021 11:05 AM         EKG: paced rhythm      Active Problems:    Hemoptysis  Resolved Problems:    * No resolved hospital problems. *      Assessment and Plan:    1. Pneumonia: Start patient on Rocephin and azithromycin, as needed bronchodilators. Will check sputum cultures and respiratory panel. 2. Hemoptysis:  We will hold Coumadin and monitor INR daily. Monitor H&H. Pulmonology consulted. 3. Atrial fibrillation:paced rhythm, continue with carvedilol and digoxin. INR on hold due to hemoptysis. 4. Systolic heart failure: Last known ejection fraction 40 to 45%. Will hold Lasix given TOMAS, patient is status post pacemaker. Continue with Entresto. 5. Acute kidney injury: We will hold Lasix, avoid nephrotoxic medications and monitor renal function. 6. Elevated troponin: Likely secondary to TOMAS, will trend troponins patient is currently asymptomatic  7. Dementia: Continue donepezil  8. Insomnia: Patient takes Ativan and melatonin at night at home we will continue with this. 9. Abdominal pain: Patient denies any nausea vomiting, hematemesis or bloody stools. CT abdomen pelvis reviewed. 10. Advance care planning: Patient is accompanied by his 3 children who are his medical power of  is all in agreement for no CPR, defibrillation or intubation. Limited x4.       DVT prophylaxis: [] Lovenox                                 [] SCDs                                 [] SQ Heparin                                 [] Encourage ambulation, low risk for DVT, no chemical or mechanical prophylaxis necessary              [x] Already on Anticoagulation                Anticipated Disposition upon discharge: [x] Home                                                                         [] Home with Home Health                                                                         [] [facility]                                                                         [] [address]          Electronically signed by Jennifer Yepez MD on 12/19/2021 at 4:23 PM

## 2021-12-19 NOTE — ED PROVIDER NOTES
[place] ENCOUNTER          Pt Name: Janis Villeda  MRN: [id number]  [place] 1/21/1933  Date of evaluation: 12/19/2021  Treating Resident Physician: Yamila Tovar DO  Supervising Physician: Oscar Perez DO    CHIEF COMPLAINT       Chief Complaint   Patient presents with    Hematemesis    Dizziness     History obtained from the patient. HISTORY OF PRESENT ILLNESS    HPI  Janis Villeda is a 80 y.o. male with a past medical history of coronary artery disease, hyperlipidemia, GERD, hypertension, persistent atrial fibrillation status post pacemaker, aortic valve replacement, CHF on digoxin, and anticoagulated on Coumadin presenting to the emergency department for evaluation of hematemesis and dizziness. The patient lives at home alone and is frequently visited by his family members twice daily for assistance with ADLs. They brought the patient to the emergency department today and they state that he is at his baseline mental status but that yesterday he was complaining of feeling lightheaded and dizzy and \"not his self,\" was having intermittent abdominal pain, and this morning he began \"coughing up dark red blood. \" They describe this as noticing blood on a napkin and in a drinking glass after he is coughing and he denies vomiting blood. He additionally states that he has had diarrhea on and off and does note that his stool has been very dark at times. Due to coughing up blood the patient's family brought the patient to the emergency department for further evaluation. The patient has no other acute complaints at this time. REVIEW OF SYSTEMS   Review of Systems   Constitutional: Positive for activity change and fatigue. Negative for fever. HENT: Positive for rhinorrhea. Negative for ear pain, sinus pressure and sinus pain. The patient reports rhinorrhea in the morning. Eyes: Negative for pain, discharge, redness and itching. Respiratory: Negative for chest tightness and shortness of breath.         + Hemoptysis   Cardiovascular: Negative for chest pain and palpitations. Gastrointestinal: Positive for abdominal pain and blood in stool. Negative for abdominal distention, constipation, diarrhea, nausea and vomiting. Patient reports dark stools possibly consistent with melena. Genitourinary: Negative for dysuria, frequency, hematuria and urgency. Musculoskeletal: Negative for arthralgias. Skin: Negative for color change. Neurological: Negative for dizziness, syncope and light-headedness.          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 [address] 2/11/2013    SSS (sick sinus syndrome) ([facility] [address]     Status post pacemaker     Past Surgical History:   Procedure Laterality Date    COLONOSCOPY  9/21/2010    Dr Roberta Barnhart  2008    [facility]    EYE SURGERY      PACEMAKER INSERTION           MEDICATIONS     Current Facility-Administered Medications:     cefTRIAXone (ROCEPHIN) 1000 mg IVPB in 50 mL D5W minibag, 1,000 mg, IntraVENous, Q24H, Uday Castorena, , Stopped at 12/19/21 1729    [START ON 12/20/2021] allopurinol (ZYLOPRIM) tablet 100 mg, 100 mg, Oral, Daily, Claudette Pineda MD    [Held by provider] aspirin EC tablet 81 mg, 81 mg, Oral, Nightly, Claudette Pineda MD    atorvastatin (LIPITOR) tablet 20 mg, 20 mg, Oral, Nightly, Claudette Pineda MD    carvedilol (COREG) tablet 6.25 mg, 6.25 mg, Oral, BID, Claudette Pineda MD    [START ON 12/20/2021] digoxin (LANOXIN) tablet 250 mcg, 250 mcg, Oral, Daily, Claudette Pineda MD    donepezil (ARICEPT) tablet 5 mg, 5 mg, Oral, Nightly, Alyson Benson MD    sacubitril-valsartan (ENTRESTO) 24-26 MG per tablet 1 tablet, 1 tablet, Oral, BID, Alyson Benson MD    LORazepam (ATIVAN) tablet 1 mg, 1 mg, Oral, Nightly, Alyson Benson MD    multivitamin 1 tablet, 1 tablet, Oral, BID, Alyson Benson MD    sodium chloride flush 0.9 % injection 5-40 mL, 5-40 mL, IntraVENous, 2 times per day, Alyson Benson MD    sodium chloride flush 0.9 % injection 5-40 mL, 5-40 mL, IntraVENous, PRN, Alyson Benson MD    0.9 % sodium chloride infusion, 25 mL, IntraVENous, PRN, Alyson Benson MD    ondansetron (ZOFRAN-ODT) disintegrating tablet 4 mg, 4 mg, Oral, Q8H PRN **OR** ondansetron (ZOFRAN) injection 4 mg, 4 mg, IntraVENous, Q6H PRN, Alyson Benson MD    polyethylene glycol (GLYCOLAX) packet 17 g, 17 g, Oral, Daily PRN, Alyson Benson MD    acetaminophen (TYLENOL) tablet 650 mg, 650 mg, Oral, Q6H PRN **OR** acetaminophen (TYLENOL) suppository 650 mg, 650 mg, Rectal, Q6H PRN, Alyson Benson MD    albuterol (PROVENTIL) nebulizer solution 5 mg, 5 mg, Nebulization, Q4H PRN, Alyson Benson MD    melatonin tablet 3 mg, 3 mg, Oral, Nightly, Alyson Benson MD    Current Outpatient Medications:     LORazepam (ATIVAN) 1 MG tablet, Take by mouth nightly. , Disp: , Rfl:     warfarin (COUMADIN) 5 MG tablet, Take as directed by [facility].  135 tablets = 90 days, Disp: 135 tablet, Rfl: 3    furosemide (LASIX) 40 MG tablet, TAKE 1 TABLET BY MOUTH DAILY, Disp: 90 tablet, Rfl: 3    donepezil (ARICEPT) 5 MG tablet, TAKE 1 TABLET BY MOUTH EVERY NIGHT, Disp: 90 tablet, Rfl: 5    atorvastatin (LIPITOR) 20 MG tablet, TAKE 1 TABLET BY MOUTH ONE TIME A DAY AT NIGHT, Disp: 90 tablet, Rfl: 2    allopurinol (ZYLOPRIM) 100 MG tablet, TAKE 1 TABLET BY MOUTH EVERY MORNING, Disp: 90 tablet, Rfl: 3    digoxin (DIGOX) 250 MCG tablet, Take 1 tablet by mouth daily, Disp: 90 tablet, Rfl: 3    carvedilol (COREG) 6.25 MG tablet, Take 1 tablet by mouth 2 times daily, Disp: 180 tablet, Rfl: 3    ENTRESTO 24-26 MG per tablet, TAKE 1 TABLET BY MOUTH TWO TIMES A DAY , Disp: 90 tablet, Rfl: 0    Melatonin 2.5 MG CHEW, Take 1 tablet by mouth nightly, Disp: , Rfl:     Multiple Vitamins-Minerals (THERAPEUTIC MULTIVITAMIN-MINERALS) tablet, Take 1 tablet by mouth 2 times daily Indications: Nutritional Support Noon and Supper, Disp: , Rfl:     Calcium Carbonate-Vitamin D (CALCIUM + D PO), Take 1 tablet by mouth 2 times daily Indications: Treatment to Prevent Vitamin Deficiency Noon and Supper, Disp: , Rfl:     aspirin 81 MG EC tablet, Take 81 mg by mouth nightly Indications: Anticoagulant Therapy With food. , Disp: , Rfl:     loperamide (IMODIUM) 2 MG capsule, Take 2 mg by mouth as needed Indications: Diarrhea , Disp: , Rfl:     acetaminophen (TYLENOL) 325 MG tablet, Take 650 mg by mouth every 6 hours as needed for Pain or Fever Indications: Pain Don't take more than 3,000 mg per day, Disp: , Rfl:       SOCIAL HISTORY     Social History     Social History Narrative    Not on file     Social History     Tobacco Use    Smoking status: Former Smoker     Packs/day: 1.00     Years: 50.00     Pack years: 50.00     Types: Cigarettes, Cigars     Quit date: 2008     Years since quittin.6    Smokeless tobacco: Never Used   Vaping Use    Vaping Use: Never used   Substance Use Topics    Alcohol use: Not Currently     Alcohol/week: 0.0 standard drinks    Drug use: No         ALLERGIES   No Known Allergies      FAMILY HISTORY     Family History   Problem Relation Age of Onset    Heart Disease Mother     Heart Disease Father          PREVIOUS RECORDS   Previous records reviewed:         PHYSICAL EXAM     ED Triage Vitals [21 0916]   BP Temp Temp src Pulse Resp SpO2 Height Weight   95/62 -- -- 80 18 97 % 5' 10\" (1.778 m) 180 lb (81.6 kg)     Initial vital signs and nursing assessment reviewed and normal. Body mass index is 25.83 kg/m². Pulsoximetry is normal per my interpretation. Additional Vital Signs:  Vitals:    12/19/21 1729   BP: 133/74   Pulse: 83   Resp: 16   Temp: 97.8 °F (36.6 °C)   SpO2: 100%       Physical Exam  Constitutional:       General: He is not in acute distress. Appearance: Normal appearance. He is not ill-appearing. HENT:      Head: Normocephalic and atraumatic. Nose: Nose normal. No congestion or rhinorrhea. Mouth/Throat:      Mouth: Mucous membranes are moist.      Pharynx: No oropharyngeal exudate or posterior oropharyngeal erythema. Comments: There appears to be dark blood over the anterior surface of the uvula. Eyes:      Extraocular Movements: Extraocular movements intact. Pupils: Pupils are equal, round, and reactive to light. Cardiovascular:      Rate and Rhythm: Normal rate and regular rhythm. Pulses: Normal pulses. Heart sounds: Normal heart sounds. Pulmonary:      Effort: Pulmonary effort is normal. No respiratory distress. Breath sounds: Normal breath sounds. Abdominal:      General: Abdomen is flat. There is no distension. Palpations: Abdomen is soft. Tenderness: There is no abdominal tenderness. Genitourinary:     Rectum: Normal.      Comments: No rectal vault tenderness or palpable hemorrhoids. Brown stool noted on OLEKSANDR. Exam performed with ED RN Allan Juares, at bedside as chaperone. Musculoskeletal:         General: No swelling or tenderness. Normal range of motion. Cervical back: Normal range of motion and neck supple. Right lower leg: No edema. Left lower leg: No edema. Comments: Chronic venous stasis changes to the bilateral lower extremities. Skin:     General: Skin is warm and dry. Capillary Refill: Capillary refill takes less than 2 seconds. Neurological:      General: No focal deficit present. Mental Status: He is alert and oriented to person, place, and time. Mental status is at baseline. MEDICAL DECISION MAKING   Initial Assessment:   3 51-year-old male presenting in the emergency department for evaluation of hemoptysis/hematemesis, lightheadedness, and dizziness.   Differential diagnosis includes but is not limited to: Upper GI bleed, ACS, COVID-19 infection, pneumonia, pulmonary embolism, digoxin toxicity  Plan:    IV, IV fluid bolus   CBC, CMP, INR, troponin, digoxin, COVID-19, lactate, TSH, UA        ED RESULTS   Laboratory results:  Labs Reviewed   CBC WITH AUTO DIFFERENTIAL - Abnormal; Notable for the following components:       Result Value    WBC 11.3 (*)     RBC 3.90 (*)     Hemoglobin 10.7 (*)     Hematocrit 33.4 (*)     MCHC 32.0 (*)     RDW-CV 17.2 (*)     RDW-SD 53.7 (*)     Segs Absolute 9.8 (*)     Lymphocytes Absolute 0.8 (*)     All other components within normal limits   COMPREHENSIVE METABOLIC PANEL W/ REFLEX TO MG FOR LOW K - Abnormal; Notable for the following components:    Glucose 138 (*)     CREATININE 1.3 (*)     BUN 30 (*)     Alkaline Phosphatase 141 (*)     Total Bilirubin 2.5 (*)     ALT 10 (*)     All other components within normal limits   TROPONIN - Abnormal; Notable for the following components:    Troponin T 0.020 (*)     All other components within normal limits   PROTIME-INR - Abnormal; Notable for the following components:    INR 2.73 (*)     All other components within normal limits   GLOMERULAR FILTRATION RATE, ESTIMATED - Abnormal; Notable for the following components:    Est, Glom Filt Rate 52 (*)     All other components within normal limits   URINE WITH REFLEXED MICRO - Abnormal; Notable for the following components:    Specific Gravity, Urine > 1.030 (*)     Protein, UA 30 (*)     All other components within normal limits   TROPONIN - Abnormal; Notable for the following components:    Troponin T 0.017 (*)     All other components within normal limits   COVID-19, RAPID   CULTURE, BLOOD 1   CULTURE, BLOOD 2   PNEUMONIA PANEL, MOLECULAR DIGOXIN LEVEL   LACTATE, SEPSIS   TSH WITH REFLEX   ANION GAP   OSMOLALITY   BLOOD OCCULT STOOL SCREEN #1   BASIC METABOLIC PANEL   CBC WITH AUTO DIFFERENTIAL   PROTIME-INR       Radiologic studies results:  CTA CHEST W WO CONTRAST   Final Result       1. No definite evidence pulmonary embolism. 2. Cardiomegaly status post pacemaker placement, mitral valve and aortic valve replacement. Increased attenuation in the coronary arteries. 3. There is pleural, pericardial and diaphragmatic calcification. 4. There is diffuse COPD, thickening of the interstitial lung markings and areas of abnormal density especially in the left lower lobe, right perihilar region and right lower lobe suggestive of inflammatory changes, please correlate clinically. 5. Thoracic spondylosis. Sclerotic density in approximately the T9 vertebral body. 6. Mild compression deformity involving approximately the T12 vertebral body. 7. Possible splenomegaly. 8. Small hiatal hernia. .               **This report has been created using voice recognition software. It may contain minor errors which are inherent in voice recognition technology. **      Final report electronically signed by DR Eloina Mclain on 12/19/2021 12:24 PM      CT ABDOMEN PELVIS W IV CONTRAST Additional Contrast? None   Final Result       1. Splenomegaly. Punctate calcifications in the liver and spleen. Free fluid adjacent to the spleen. 2.. Small bilateral renal cysts. No evidence of renal stones or hydronephrosis. 3. Tiny gallstone. Small amount of pericholecystic fluid. 4. Atherosclerotic calcification in the abdominal aorta, iliac and visceral arteries. 5. Diverticulosis involving the colon. 6. Enlarged prostate gland. Slightly thick-walled bladder. 7. Lumbar spondylosis. 8. Cardiomegaly, status post aortic valve and mitral valve replacement.    9. Diaphragmatic, pleural and pericardial calcification               **This report has been created using voice recognition software. It may contain minor errors which are inherent in voice recognition technology. **      Final report electronically signed by DR Lucia Caro on 12/19/2021 12:41 PM      XR CHEST PORTABLE   Final Result   1. Significant interval deterioration since previous study dated 10 of October 2016. 2. Moderate cardiomegaly status post aortic valve replacement, mitral valve replacement and pacemaker placement. 3. Bilateral pulmonary opacification, largely new since previous study. 4. Abnormal density left retrocardiac space which could represent infiltrate and/pleural effusion, new. 5. Calcification along the right hemidiaphragm and along the pleural surfaces bilaterally possibly related to old asbestos exposure. 6. Thoracic spondylosis. Pinky Angles **This report has been created using voice recognition software. It may contain minor errors which are inherent in voice recognition technology. **      Final report electronically signed by DR Lucia Caro on 12/19/2021 11:05 AM          ED Medications administered this visit:   Medications   cefTRIAXone (ROCEPHIN) 1000 mg IVPB in 50 mL D5W minibag (0 mg IntraVENous Stopped 12/19/21 1729)   allopurinol (ZYLOPRIM) tablet 100 mg (has no administration in time range)   aspirin EC tablet 81 mg ( Oral Automatically Held 12/22/21 2100)   atorvastatin (LIPITOR) tablet 20 mg (has no administration in time range)   carvedilol (COREG) tablet 6.25 mg (has no administration in time range)   digoxin (LANOXIN) tablet 250 mcg (has no administration in time range)   donepezil (ARICEPT) tablet 5 mg (has no administration in time range)   sacubitril-valsartan (ENTRESTO) 24-26 MG per tablet 1 tablet (has no administration in time range)   LORazepam (ATIVAN) tablet 1 mg (has no administration in time range)   multivitamin 1 tablet (has no administration in time range)   sodium chloride flush 0.9 % injection 5-40 mL (has no administration in time range)   sodium chloride flush 0.9 % injection 5-40 mL (has no administration in time range)   0.9 % sodium chloride infusion (has no administration in time range)   ondansetron (ZOFRAN-ODT) disintegrating tablet 4 mg (has no administration in time range)     Or   ondansetron (ZOFRAN) injection 4 mg (has no administration in time range)   polyethylene glycol (GLYCOLAX) packet 17 g (has no administration in time range)   acetaminophen (TYLENOL) tablet 650 mg (has no administration in time range)     Or   acetaminophen (TYLENOL) suppository 650 mg (has no administration in time range)   albuterol (PROVENTIL) nebulizer solution 5 mg (has no administration in time range)   melatonin tablet 3 mg (has no administration in time range)   0.9 % sodium chloride bolus (0 mLs IntraVENous Stopped 12/19/21 1239)   iopamidol (ISOVUE-370) 76 % injection 80 mL (80 mLs IntraVENous Given 12/19/21 1203)   azithromycin (ZITHROMAX) 500 mg in D5W 250ml addavial (0 mg IntraVENous Stopped 12/19/21 1830)         ED COURSE     ED Course as of 12/19/21 1931   Sun Dec 19, 2021   1106 Troponin T(!): 0.020 [DO]   1106 INR(!): 2.73 [DO]   1106 Comprehensive Metabolic Panel w/ Reflex to MG(!):    Glucose 138(!)   Creatinine 1.3(!)   BUN 30(!)   Sodium 142   Potassium 3.8   Chloride 104   CO2 27   Calcium 9.3   AST 17   Alk Phos 141(!)   Total Protein 7.0   Albumin 4.0   Bilirubin 2.5(!)   ALT 10(!)  BUN and creatinine slightly elevated compared to baseline historical values. [DO]   1107 Hemoglobin Quant(!): 10.7  Hemoglobin stable compared to baseline historical values. [DO]   1114 XR CHEST PORTABLE  Given interval change on chest x-ray we will proceed with CTA chest to evaluate for PE and further delineate findings on chest x-ray and also evaluate CT abdomen pelvis with IV contrast to evaluate abdominal pain.  [DO]      ED Course User Index  [DO] Monisha Colón DO       CTA of the chest was remarkable for no definite evidence of pulmonary embolism and showed diffuse COPD, thickening of the interstitial lung markings and areas of abnormal density especially in the left lower lobe, right perihilar region and right lower lobe suggestive of inflammatory changes. Given that the patient is having hemoptysis and has an elevated troponin will start the patient on Rocephin and azithromycin for pneumonia coverage and plan admit the patient to the hospital for further evaluation. This case was discussed with the admitting hospitalist, Earnest Sevilla, who admitted the patient to the hospital.      MEDICATION CHANGES     New Prescriptions    No medications on file         FINAL DISPOSITION     Final diagnoses:   Dizziness   Hemoptysis   Pneumonia of left lung due to infectious organism, unspecified part of lung     Condition: condition: stable  Dispo: Admit to med/surg floor      This transcription was electronically signed. Parts of this transcriptions may have been dictated by use of voice recognition software and electronically transcribed, and parts may have been transcribed with the assistance of an ED scribe. The transcription may contain errors not detected in proofreading. Please refer to my supervising physician's documentation if my documentation differs.     Electronically Signed: Roberto Eid DO, 12/19/21, 7:31 PM          Roberto Eid DO  Resident  12/19/21 [id number]

## 2021-12-20 NOTE — CONSULTS
[facility]    Patient - Kirill Guzman   MRN -  [id number]   Acct # - [de-identified]   - 1933      Date of Admission -  2021  9:11 AM  Date of evaluation -  2021  Room - [facility]5 e Candido Pantoja MD Primary Care Physician - Fco Kaiser MD     Problem List      Active Hospital Problems    Diagnosis Date Noted    Hemoptysis [R04.2] 2021     Reason for Consult    Coughing up blood. HPI   Kirill Guzman is a 80 y.o. male admitted for malaise, confusion, coughing up blood, fatigue. CT chest reveals cardiomegaly pulmonary infiltrates, emphysema  H/O AVR (tissue valve) Afib, PPM, mitral valve repair (ring)  Smoked up to 2 ppd for 45 years quit in . Stable on room air, cognitive impairment, son at the bedside good historian.   No hemoptysis today as per RN  INR 2.7    PMHx   Past Medical History      Diagnosis Date    Aortic stenosis     [place] Aortic Valve replacement    CAD (coronary artery disease)     Status post bypass    GERD (gastroesophageal reflux disease)     Hyperlipidemia     Kaposi's sarcoma ([address]     Non-HIV-related    [employer] single ICD 2011    Persistent atrial fibrillation ([address] 2013    SSS (sick sinus syndrome) ([address]     Status post pacemaker      Past Surgical History        Procedure Laterality Date    COLONOSCOPY  2010    Dr Rahel Tilley [facility]    EYE SURGERY      PACEMAKER INSERTION       Meds    Current Medications    cefTRIAXone (ROCEPHIN) IV  2,000 mg IntraVENous Q24H    azithromycin  500 mg IntraVENous Q24H    allopurinol  100 mg Oral Daily    [Held by provider] aspirin  81 mg Oral Nightly    atorvastatin  20 mg Oral Nightly    carvedilol  6.25 mg Oral BID    digoxin  250 mcg Oral Daily    donepezil  5 mg Oral Nightly    sacubitril-valsartan  1 tablet Oral BID    LORazepam  1 mg Oral Nightly    multivitamin  1 tablet Oral BID    sodium chloride flush  5-40 mL IntraVENous 2 times per day    melatonin  3 mg Oral Nightly     sodium chloride flush, sodium chloride, ondansetron **OR** ondansetron, polyethylene glycol, acetaminophen **OR** acetaminophen, albuterol  IV Drips/Infusions   sodium chloride       Home Medications  Medications Prior to Admission: LORazepam (ATIVAN) 1 MG tablet, Take by mouth nightly. warfarin (COUMADIN) 5 MG tablet, Take as directed by [facility]. 135 tablets = 90 days  furosemide (LASIX) 40 MG tablet, TAKE 1 TABLET BY MOUTH DAILY  donepezil (ARICEPT) 5 MG tablet, TAKE 1 TABLET BY MOUTH EVERY NIGHT  atorvastatin (LIPITOR) 20 MG tablet, TAKE 1 TABLET BY MOUTH ONE TIME A DAY AT NIGHT  allopurinol (ZYLOPRIM) 100 MG tablet, TAKE 1 TABLET BY MOUTH EVERY MORNING  digoxin (DIGOX) 250 MCG tablet, Take 1 tablet by mouth daily  carvedilol (COREG) 6.25 MG tablet, Take 1 tablet by mouth 2 times daily  ENTRESTO 24-26 MG per tablet, TAKE 1 TABLET BY MOUTH TWO TIMES A DAY   Melatonin 2.5 MG CHEW, Take 1 tablet by mouth nightly  Multiple Vitamins-Minerals (THERAPEUTIC MULTIVITAMIN-MINERALS) tablet, Take 1 tablet by mouth 2 times daily Indications: Nutritional Support Noon and Supper  Calcium Carbonate-Vitamin D (CALCIUM + D PO), Take 1 tablet by mouth 2 times daily Indications: Treatment to Prevent Vitamin Deficiency Noon and Supper  aspirin 81 MG EC tablet, Take 81 mg by mouth nightly Indications: Anticoagulant Therapy With food. loperamide (IMODIUM) 2 MG capsule, Take 2 mg by mouth as needed Indications: Diarrhea   acetaminophen (TYLENOL) 325 MG tablet, Take 650 mg by mouth every 6 hours as needed for Pain or Fever Indications: Pain Don't take more than 3,000 mg per day  Diet    ADULT DIET; Regular  Allergies    Patient has no known allergies. Social History     Social History     Socioeconomic History    Marital status:       Spouse name: Not on file    Number of children: 6    Years of education: Not on file    Highest education level: Not on file   Occupational History    Not on file   Tobacco Use    Smoking status: Former Smoker     Packs/day: 1.00     Years: 50.00     Pack years: 50.00     Types: Cigarettes, Cigars     Quit date: 2008     Years since quittin.6    Smokeless tobacco: Never Used   Vaping Use    Vaping Use: Never used   Substance and Sexual Activity    Alcohol use: Not Currently     Alcohol/week: 0.0 standard drinks    Drug use: No    Sexual activity: Not on file   Other Topics Concern    Not on file   Social History Narrative    Not on file     Social Determinants of Health     Financial Resource Strain:     Difficulty of Paying Living Expenses: Not on file   Food Insecurity:     Worried About [address] in the Last Year: Not on file    [address] [employer] in the Last Year: Not on file   Transportation Needs:     Lack of Transportation (Medical): Not on file    Lack of Transportation (Non-Medical):  Not on file   Physical Activity:     Days of Exercise per Week: Not on file    Minutes of Exercise per Session: Not on file   Stress:     Feeling of Stress : Not on file   Social Connections:     Frequency of Communication with Friends and Family: Not on file    Frequency of Social Gatherings with Friends and Family: Not on file    Attends Jainism Services: Not on file    Active Member of [address] [place] [address] or Organizations: Not on file    Attends Club or Organization Meetings: Not on file    Marital Status: Not on file   Intimate Partner Violence:     Fear of Current or Ex-Partner: Not on file    Emotionally Abused: Not on file    Physically Abused: Not on file    Sexually Abused: Not on file   Housing Stability:     Unable to Pay for Housing in the Last Year: Not on file    Number of [place] in the Last Year: Not on file    Unstable Housing in the Last Year: Not on file     Family History          Problem Relation Age of Onset    Heart Disease Mother     Heart Disease Father        ROS - 11 systems   General confusion  HEENT Denies any diplopia, tinnitus or vertigo  Resp coughing up blood  Cardiac Denies any chest pain, palpitations, claudication or edema  GI Denies any melena, hematochezia, hematemesis or pyrosis   Denies any frequency, urgency, hesitancy or incontinence  Heme Denies bruising or bleeding easily  Endocrine Denies any history of diabetes or thyroid disease  Neuro Denies any focal motor or sensory deficits  Psychiatric Denies anxiety, depression, suicidal ideation  Skin Denies rashes, itching, open sores    Vitals     height is 5' 10\" (1.778 m) and weight is 172 lb 3.2 oz (78.1 kg). His axillary temperature is 97.7 °F (36.5 °C). His blood pressure is 110/61 and his pulse is 70. His respiration is 18 and oxygen saturation is 97%. Body mass index is 24.71 kg/m². I/O        Intake/Output Summary (Last 24 hours) at 12/20/2021 1601  Last data filed at 12/20/2021 1133  Gross per 24 hour   Intake 640 ml   Output --   Net 640 ml     I/O last 3 completed shifts: In: 640 [P.O.:640]  Out: -    Patient Vitals for the past 96 hrs (Last 3 readings):   Weight   12/19/21 2145 172 lb 3.2 oz (78.1 kg)   12/19/21 0916 180 lb (81.6 kg)     Exam   General Appearance  Awake, alert, oriented, in no acute distress, on room air  HEENT - Normal, Head is normocephalic, atraumatic. EOMI, PERRLA  Neck - Supple, symmetrical, trachea midline and Soft, trachea midline and straight  Lungs - few rhonchi, good air movement  Cardiovascular - paced beats. Abdomen - Soft, nontender, nondistended, no masses or organomegaly  Neurologic - CN II-XII are grossly intact.  There are no focal motor or sensory deficits  Skin - No bruising or bleeding  Extremities - No cyanosis, clubbing or edema    Labs  - Old records and notes have been reviewed in [facility]   CBC     Lab Results   Component Value Date    WBC 6.7 12/20/2021    RBC 3.54 left retrocardiac space which could represent infiltrate and/pleural effusion, new. 5. Calcification along the right hemidiaphragm and along the pleural surfaces bilaterally possibly related to old asbestos exposure. 6. Thoracic spondylosis. .                   **This report has been created using voice recognition software. It may contain minor errors which are inherent in voice recognition technology. **       Final report electronically signed by DR Andreas Roper on 12/19/2021 11:05 AM             CT Scans  Impression       1. No definite evidence pulmonary embolism. 2. Cardiomegaly status post pacemaker placement, mitral valve and aortic valve replacement. Increased attenuation in the coronary arteries. 3. There is pleural, pericardial and diaphragmatic calcification. 4. There is diffuse COPD, thickening of the interstitial lung markings and areas of abnormal density especially in the left lower lobe, right perihilar region and right lower lobe suggestive of inflammatory changes, please correlate clinically. 5. Thoracic spondylosis. Sclerotic density in approximately the T9 vertebral body. 6. Mild compression deformity involving approximately the T12 vertebral body. 7. Possible splenomegaly. 8. Small hiatal hernia. .                   **This report has been created using voice recognition software. It may contain minor errors which are inherent in voice recognition technology. **       Final report electronically signed by DR Andreas Roper on 12/19/2021 12:24 PM       (See actual reports for details)    Assessment   Coughing up blood likely due to LRTI in a background of [address] [place] [address] Road and increased LVEDP no active bleeding during my visit, RN reports no bleeding today. (had hemoptysis after my visit)  Chronic OAC Afib, MV repair, AVR  No high risk lesions (tumors, cavitary ) seen in CT chest  Personal h/o smoking  Dementia, Limited code x 4   Recommendations   Case discussed with Christopher Adamson and his soon who is one of the caregivers, would prefer to go home. Would resume [address] after a few days  I believe it is safe to go home, I cannot guarantee that Clayton Gamez would not cough up blood  again blood but he has a good  social situation with family close by and transportation back to the hospital in case of worsening. Complete 5 days of antibiotics, may switch to oral. Augmentin/Zithromax  Son aware of guardedcondition and wish to go back home. F/U pulm clinic in 2 mos    \"Thank you for asking us to see this interesting patient\"    Case discussed with nurse and patient/family. Questions and concerns addressed.     Electronically signed by       Eddie Aguirre MD on 12/20/2021 at 4:01 PM

## 2021-12-20 NOTE — DISCHARGE SUMMARY
DISCHARGE SUMMARY      Patient Identification:   Madeleine Lepe   : 1933  MRN: [id number]   Account: [de-identified]      Patient's PCP: Juliette Kocher, MD    Admit Date: 2021     Discharge Date:   2021    Admitting Physician: Reggie Kellogg MD     Discharge Physician: Milly Leo MD     Discharge Diagnoses:    Community acquired pneumonia  Hemoptysis  Chronic normocytic anemia  Persistent atrial fibrillation  Chronic heart failure with reduced ejection fraction  Coronary artery disease  History of mitral ring repair  History bioprosthetic aortic valve replacement  Elevated troponin  Elevated liver enzymes  Dementia    The patient was seen and examined on day of discharge and this discharge summary is in conjunction with any daily progress note from day of discharge. Hospital Course:   Madeleine Lepe is a 80 y.o. male past medical history of persistent atrial fibrillation on Coumadin, systolic heart failure status post ICD placement, CAD status post CABG, history of mitral valve physioring repair, bioprosthetic aortic valve replacement, dementia admitted to [facility] on 2021 for hemoptysis, worsening cough and fatigue. Due to patient's history of dementia, he was accompanied by his 3 children who all share power of . Patient's daughter reported he had been having chills, and was slightly more confused than normal.  Daughter checked up on him yesterday and found that he was coughing up blood and he has had 3-4 episodes on day of admission. Had additional episode of hemoptysis of bright red sputum in the ED. Labs in the ED were significant for white count 11.3, hemoglobin 10.7, hematocrit 33.5,. He had elevated liver enzymes of , total bili total bilirubin 2.5. Troponin was elevated at 0.020. CTA showed no evidence of pulmonary embolism.   Diffuse COPD, thickening of interstitial lung markings, areas of abnormal density in left lower lobe, wall  Heart - S1S2 RRR, no murmurs or gallops  Abdomen - Soft, non tender non distended.  Normoactive bowel sounds  Neurological - II-XII grossly intact, no neurological deficits  Musculoskeletal - 5/5 power upper and lower extremities equal bilaterally.  Full ROM of all limbs  Extremities - no edema, no cyanosis or clubbing  Skin - normal coloration and turgor, no rashes, no suspicious skin lesions noted    Labs: For convenience and continuity at follow-up the following most recent labs are provided:      CBC:    Lab Results   Component Value Date    WBC 6.7 12/20/2021    HGB 9.7 12/20/2021    HCT 30.1 12/20/2021     12/20/2021       Renal:    Lab Results   Component Value Date     12/20/2021    K 3.7 12/20/2021    K 3.8 12/19/2021     12/20/2021    CO2 25 12/20/2021    BUN 29 12/20/2021    CREATININE 1.0 12/20/2021    CALCIUM 9.1 12/20/2021         Significant Diagnostic Studies    Radiology:   CTA CHEST W WO CONTRAST   Final Result       1. No definite evidence pulmonary embolism. 2. Cardiomegaly status post pacemaker placement, mitral valve and aortic valve replacement. Increased attenuation in the coronary arteries. 3. There is pleural, pericardial and diaphragmatic calcification. 4. There is diffuse COPD, thickening of the interstitial lung markings and areas of abnormal density especially in the left lower lobe, right perihilar region and right lower lobe suggestive of inflammatory changes, please correlate clinically. 5. Thoracic spondylosis. Sclerotic density in approximately the T9 vertebral body. 6. Mild compression deformity involving approximately the T12 vertebral body. 7. Possible splenomegaly. 8. Small hiatal hernia. .               **This report has been created using voice recognition software. It may contain minor errors which are inherent in voice recognition technology. **      Final report electronically signed by DR Des Miranda on 12/19/2021 12:24 PM      CT ABDOMEN PELVIS W IV CONTRAST Additional Contrast? None   Final Result       1. Splenomegaly. Punctate calcifications in the liver and spleen. Free fluid adjacent to the spleen. 2.. Small bilateral renal cysts. No evidence of renal stones or hydronephrosis. 3. Tiny gallstone. Small amount of pericholecystic fluid. 4. Atherosclerotic calcification in the abdominal aorta, iliac and visceral arteries. 5. Diverticulosis involving the colon. 6. Enlarged prostate gland. Slightly thick-walled bladder. 7. Lumbar spondylosis. 8. Cardiomegaly, status post aortic valve and mitral valve replacement. 9. Diaphragmatic, pleural and pericardial calcification               **This report has been created using voice recognition software. It may contain minor errors which are inherent in voice recognition technology. **      Final report electronically signed by DR Madeleine Clifford on 12/19/2021 12:41 PM      XR CHEST PORTABLE   Final Result   1. Significant interval deterioration since previous study dated 10 of October 2016. 2. Moderate cardiomegaly status post aortic valve replacement, mitral valve replacement and pacemaker placement. 3. Bilateral pulmonary opacification, largely new since previous study. 4. Abnormal density left retrocardiac space which could represent infiltrate and/pleural effusion, new. 5. Calcification along the right hemidiaphragm and along the pleural surfaces bilaterally possibly related to old asbestos exposure. 6. Thoracic spondylosis. Marcella Rodriguez **This report has been created using voice recognition software. It may contain minor errors which are inherent in voice recognition technology. **      Final report electronically signed by DR Madeleine Clifford on 12/19/2021 11:05 AM             Consults:     IP CONSULT TO PULMONOLOGY  IP CONSULT TO SOCIAL WORK    Disposition:    [x] Home       [] TCU       [] Rehab       [] Psych       [] SNF       [] [place]       [] Other-    Condition at Discharge: Stable    Code Status:  Limited     Patient Instructions:    Discharge lab work: follow up with coumadin clinic in 5-7 days  Activity: activity as tolerated  Diet: ADULT DIET; Regular      Follow-up visits:   Gus López MD  [address] [place] [address]  [phone]    Schedule an appointment as soon as possible for a visit      Hussain Babcock, [address]  [address] [facility],[address]  [address] [place] [address]  [phone]      follow up in 2 months         Discharge Medications:         Current Discharge Medication List      START taking these medications    Details   amoxicillin-clavulanate (AUGMENTIN) 875-125 MG per tablet Take 1 tablet by mouth 2 times daily for 6 days  Qty: 14 tablet, Refills: 0      azithromycin (ZITHROMAX) 250 MG tablet Take 1 tablet by mouth daily for 4 days  Qty: 4 tablet, Refills: 0    Associated Diagnoses: Community acquired pneumonia of left lung, unspecified part of lung           Current Discharge Medication List      CONTINUE these medications which have CHANGED    Details   aspirin 81 MG EC tablet Take 1 tablet by mouth nightly Indications: Anticoagulant Therapy With food. Qty: 30 tablet, Refills: 3      warfarin (COUMADIN) 5 MG tablet Take as directed by [facility]. 135 tablets = 90 days  Qty: 135 tablet, Refills: 3    Associated Diagnoses: Permanent atrial fibrillation ([address]           Current Discharge Medication List      CONTINUE these medications which have NOT CHANGED    Details   LORazepam (ATIVAN) 1 MG tablet Take by mouth nightly.        furosemide (LASIX) 40 MG tablet TAKE 1 TABLET BY MOUTH DAILY  Qty: 90 tablet, Refills: 3    Associated Diagnoses: Chronic systolic congestive heart failure (HCC)      donepezil (ARICEPT) 5 MG tablet TAKE 1 TABLET BY MOUTH EVERY NIGHT  Qty: 90 tablet, Refills: 5    Comments: **Patient requests 90 days supply**  Associated Diagnoses: Mild cognitive impairment      atorvastatin (LIPITOR) 20 MG tablet TAKE 1 TABLET BY MOUTH ONE TIME A DAY AT NIGHT  Qty: 90 tablet, Refills: 2    Associated Diagnoses: Hyperlipidemia, unspecified hyperlipidemia type      allopurinol (ZYLOPRIM) 100 MG tablet TAKE 1 TABLET BY MOUTH EVERY MORNING  Qty: 90 tablet, Refills: 3    Associated Diagnoses: H/O: gout      digoxin (DIGOX) 250 MCG tablet Take 1 tablet by mouth daily  Qty: 90 tablet, Refills: 3    Associated Diagnoses: Chronic systolic congestive heart failure (HCC)      carvedilol (COREG) 6.25 MG tablet Take 1 tablet by mouth 2 times daily  Qty: 180 tablet, Refills: 3    Associated Diagnoses: Chronic systolic congestive heart failure (HCC)      ENTRESTO 24-26 MG per tablet TAKE 1 TABLET BY MOUTH TWO TIMES A DAY   Qty: 90 tablet, Refills: 0    Associated Diagnoses: Permanent atrial fibrillation (HCC)      Melatonin 2.5 MG CHEW Take 1 tablet by mouth nightly      Multiple Vitamins-Minerals (THERAPEUTIC MULTIVITAMIN-MINERALS) tablet Take 1 tablet by mouth 2 times daily Indications: Nutritional Support Noon and Supper      Calcium Carbonate-Vitamin D (CALCIUM + D PO) Take 1 tablet by mouth 2 times daily Indications: Treatment to Prevent Vitamin Deficiency Noon and Supper      loperamide (IMODIUM) 2 MG capsule Take 2 mg by mouth as needed Indications: Diarrhea       acetaminophen (TYLENOL) 325 MG tablet Take 650 mg by mouth every 6 hours as needed for Pain or Fever Indications: Pain Don't take more than 3,000 mg per day           Time Spent on discharge is more than 45 minutes in the examination, evaluation, counseling and review of medications and discharge plan. Signed: Thank you Gerri Olson MD for the opportunity to be involved in this patient's care.     Electronically signed by Hilda Bello MD on 12/20/2021 at 5:36 PM

## 2021-12-20 NOTE — ED NOTES
ED nurse-to-nurse bedside report    Chief Complaint   Patient presents with    Hematemesis    Dizziness      LOC: alert and orientated to name, place, date  Vital signs   Vitals:    12/19/21 1253 12/19/21 1354 12/19/21 1510 12/19/21 1729   BP:  116/65 113/77 133/74   Pulse:  79 72 83   Resp: 18 26 14 16   Temp:    97.8 °F (36.6 °C)   TempSrc:    Oral   SpO2: 93% 100% 100% 100%   Weight:       Height:          Pain: denies   Pain Interventions: n/a   Pain Goal: n/a   Oxygen: No    Current needs required none    Telemetry: Yes  LDAs:   Peripheral IV 12/19/21 Right Antecubital (Active)   Site Assessment Dry; Intact; Clean 12/19/21 0946   Line Status Blood return noted;Specimen collected; Flushed 12/19/21 0946   Dressing Status Clean;Dry; Intact 12/19/21 0946     Continuous Infusions:    sodium chloride       Mobility: Requires assistance * 1  [place] Fall Risk Score:    Fall Risk 4/22/2021 11/30/2020 5/14/2020 1/14/2019 11/20/2017 10/6/2016 10/6/2015   2 or more falls in past year? no no no no no no no   Fall with injury in past year? no no no no no no no     Fall Interventions: call light at bedside, fall precautions discussed   Report given to: 94 Tanmay Anguiano RN  12/19/21 [id number]

## 2021-12-20 NOTE — PROGRESS NOTES
PROGRESS NOTE      Patient:  Manisha Gallagher      Unit/Bed:6K-17/017-A    YOB: 1933    MRN: [id number]       Acct: [de-identified]     PCP: Michael Morales MD    Date of Admission: 12/19/2021      Assessment/Plan:    Anticipated Discharge: pending pulmonology consult     Active Hospital Problems    Diagnosis Date Noted    Hemoptysis [R04.2] 12/19/2021       Pneumonia  - CXR: Bilateral pulmonary opacification. CTA: areas of abnormal density especially in the left lower lobe, right perihilar region and right lower lobe suggestive of inflammatory changes. - WBC normal. Blood cultures x 2 NGTD. Covid (-)  - Continue Rocephin and Zithromax (day 2). - Sputum culture and respiratory panel pending. Hemoptysis  - Etiology unclear. Could be due to pneumonia; however, per CXR, does have a calcification along the right hemidiaphragm and along the pleural surfaces bilaterally possibly related to old asbestos exposure  - Hold Coumadin, monitor INR and CBC daily.   - Pulmonology consulted. Chronic Normocytic Anemia  - Hgb 10.7 on admission (baseline appears 9-11)  - Monitor for increased bleeding  - CBC in am    Persistent Atrial fibrillation  - s/p PPM. EKG shows ventricular paced rhythm  - Continue Carvedilol and Digoxin. - Coumadin on hold due to hemoptysis. Systolic heart failure  - Last known ejection fraction 40 to 45%. Patient is s/p ICD  - Clinically euvolemic.   - Will hold Lasix given TOMAS    Acute kidney injury, resolved  - Hold Lasix   - Avoid nephrotoxic medications and monitor renal function    CAD s/p CABG  History of Mitral valve physio ring repair, bioprosthetic aortic valve replacement  - Hold aspirin given hemoptysis until evaluated by pulmonology    Elevated troponin   - Likely secondary to TOMAS, downtrending from 0.02 --> 0.017  - Denies chest pain    Elevated Liver Enzymes  - Unclear etiology  - Bilirubin 2.5 on admission.  ALP elevated at 141.  - CT abdomen: Punctate calcifications in the liver and spleen  - Repeat CMP in am. Consider RUQ US    Dementia  - Continue Donepezil  - Sitter at bedside    Insomnia   - Continue with home dose Ativan and Melatonin    Abdominal pain, resolved  - Patient denies any nausea vomiting, hematemesis or bloody stools. - CT abdomen pelvis reviewed. - Occult blood negative    Advance care planning  - Patient is accompanied by his 3 children who are his medical power of  is all in agreement for no CPR, defibrillation or intubation. Limited x4.       Chief Complaint: Hemoptysis    Hospital Course:   Per H&P, \"History obtained from the patient and children at bedside.     The patient is a 80 y.o. male who presents with complaints of hemoptysis, worsening cough and fatigue. Patient has a history of dementia and is accompanied by his 3 children who all shared medical power of 's. His daughter reports that patient was having chills, appears slightly more confused and tired. This morning she checked up on him and states he showed her that he was coughing up blood. He has had at least 3 or 4 episodes today of hemoptysis. She states that he does have a history of atrial fibrillation and is on Coumadin. His INR levels are therapeutic and within range today however he did have supratherapeutic levels 2 weeks ago. Patient states that he feels fine and does not understand why he is in the hospital. He denies any chest pain, shortness of breath.     In the ED patient had another episode of hemoptysis with bright red sputum on a tissue. He is saturating well on room air. He was noted to be hypotensive and given IV fluids. \"    Subjective:  Patient seen and examined. No acute events overnight. 2 children and sitter at bedside. Patient is sitting up in chair and has no complaints today. He denies any dizziness or lightheadedness this am. He states he had one episode of hemoptysis overnight.  He denies fevers, chills, chest pain, shortness of breath, nausea/vomiting, abdominal pain, leg pain or swelling. Medications:  Reviewed    Infusion Medications    sodium chloride       Scheduled Medications    cefTRIAXone (ROCEPHIN) IV  2,000 mg IntraVENous Q24H    azithromycin  500 mg IntraVENous Q24H    allopurinol  100 mg Oral Daily    [Held by provider] aspirin  81 mg Oral Nightly    atorvastatin  20 mg Oral Nightly    carvedilol  6.25 mg Oral BID    digoxin  250 mcg Oral Daily    donepezil  5 mg Oral Nightly    sacubitril-valsartan  1 tablet Oral BID    LORazepam  1 mg Oral Nightly    multivitamin  1 tablet Oral BID    sodium chloride flush  5-40 mL IntraVENous 2 times per day    melatonin  3 mg Oral Nightly     PRN Meds: sodium chloride flush, sodium chloride, ondansetron **OR** ondansetron, polyethylene glycol, acetaminophen **OR** acetaminophen, albuterol      Intake/Output Summary (Last 24 hours) at 12/20/2021 1004  Last data filed at 12/20/2021 0348  Gross per 24 hour   Intake 400 ml   Output --   Net 400 ml       Diet:  ADULT DIET; Regular    Exam:  BP 99/76   Pulse 71   Temp 97.7 °F (36.5 °C) (Oral)   Resp 18   Ht 5' 10\" (1.778 m)   Wt 172 lb 3.2 oz (78.1 kg)   SpO2 92%   BMI 24.71 kg/m²     Physical Exam  Physical Examination: General appearance - alert, awake appears to be in no acute distress  HEENT: Atraumatic normocephalic,no JVD, trachea is midline  Neck - supple, no significant adenopathy, no JVD, or carotid bruits  Chest - Bilateral air entry, no wheezes, crackles or rhonchi. Non tender to palpation of chest wall  Heart - S1S2 RRR, no murmurs or gallops  Abdomen - Soft, non tender non distended. Normoactive bowel sounds  Neurological - II-XII grossly intact, no neurological deficits  Musculoskeletal - 5/5 power upper and lower extremities equal bilaterally.   Full ROM of all limbs  Extremities - no edema, no cyanosis or clubbing  Skin - normal coloration and turgor, no rashes, no suspicious skin lesions noted     Labs: spleen. 2.. Small bilateral renal cysts. No evidence of renal stones or hydronephrosis. 3. Tiny gallstone. Small amount of pericholecystic fluid. 4. Atherosclerotic calcification in the abdominal aorta, iliac and visceral arteries. 5. Diverticulosis involving the colon. 6. Enlarged prostate gland. Slightly thick-walled bladder. 7. Lumbar spondylosis. 8. Cardiomegaly, status post aortic valve and mitral valve replacement. 9. Diaphragmatic, pleural and pericardial calcification               **This report has been created using voice recognition software. It may contain minor errors which are inherent in voice recognition technology. **      Final report electronically signed by DR Valentino Richardson on 12/19/2021 12:41 PM      XR CHEST PORTABLE   Final Result   1. Significant interval deterioration since previous study dated 10 of October 2016. 2. Moderate cardiomegaly status post aortic valve replacement, mitral valve replacement and pacemaker placement. 3. Bilateral pulmonary opacification, largely new since previous study. 4. Abnormal density left retrocardiac space which could represent infiltrate and/pleural effusion, new. 5. Calcification along the right hemidiaphragm and along the pleural surfaces bilaterally possibly related to old asbestos exposure. 6. Thoracic spondylosis. Foster Copping **This report has been created using voice recognition software. It may contain minor errors which are inherent in voice recognition technology. **      Final report electronically signed by DR Valentino Richardson on 12/19/2021 11:05 AM          Diet: ADULT DIET;  Regular    DVT prophylaxis: [] Lovenox                                 [] SCDs                                 [] SQ Heparin                                 [] Encourage ambulation           [x] Already on Anticoagulation     Disposition:    [x] Home       [] TCU       [] Rehab       [] Psych       [] SNF       [] [facility]       [] Other-    Code Status: Limited    PT/OT Eval Status: not indicated      Electronically signed by Shira Alexander MD on 12/20/2021 at 10:04 AM    This note was electronically signed. Parts of this note may have been dictated by use of voice recognition software and electronically transcribed. The note may contain errors not detected in proofreading. Please refer to my supervising physician's documentation if my documentation differs. Topical Sulfur Applications Counseling: Topical Sulfur Counseling: Patient counseled that this medication may cause skin irritation or allergic reactions.  In the event of skin irritation, the patient was advised to reduce the amount of the drug applied or use it less frequently.   The patient verbalized understanding of the proper use and possible adverse effects of topical sulfur application.  All of the patient's questions and concerns were addressed.

## 2021-12-20 NOTE — PROGRESS NOTES
Discharge teaching and instructions for diagnosis/procedure of hemotypsis completed with patient using teachback method. AVS reviewed. Printed prescriptions given to patient. Patient voiced understanding regarding prescriptions, follow up appointments, and care of self at home. Discharged in a wheelchair to  home with support per family.   Electronically signed by Micah Cervantes RN on 12/20/2021 at 6:04 PM

## 2021-12-20 NOTE — CARE COORDINATION
12/20/21, 8:57 AM EST  DISCHARGE PLANNING EVALUATION:    Diogo Shea       Admitted: 12/19/2021/ [facility] day: 1   Location: [facility]17/017-A Reason for admit: Dizziness [R42]  Hemoptysis [R04.2]  Pneumonia of left lung due to infectious organism, unspecified part of lung [J18.9]   PMH:  has a past medical history of Aortic stenosis, CAD (coronary artery disease), GERD (gastroesophageal reflux disease), Hyperlipidemia, Kaposi's sarcoma ([facility] [address] [employer] single ICD, Persistent atrial fibrillation ([facility] [address] and SSS (sick sinus syndrome) ([facility] [address] Procedure: none  Barriers to Discharge:  Trops elev, bili 2.5, Hgb 9.7. IV Rocephin. Pulm consult pending. PT/OT. PCP: Rosanna Tripathi MD  Readmission Risk Score: 14.6 ( )%    Patient Goals/Plan/Treatment Preferences: Yuriy Myrick is from home alone with assistance from family. SW has been consulted for possible ECF. Transportation/Food Security/Housekeeping Addressed:  No issues identified.

## 2021-12-21 NOTE — CARE COORDINATION
12/21/21, 7:18 AM EST    Patient goals/plan/ treatment preferences discussed by  and . Patient goals/plan/ treatment preferences reviewed with patient/ family. Patient/ family verbalize understanding of discharge plan and are in agreement with goal/plan/treatment preferences. Understanding was demonstrated using the teach back method. AVS provided by RN at time of discharge, which includes all necessary medical information pertaining to the patients current course of illness, treatment, post-discharge goals of care, and treatment preferences. IMM Letter  IMM Letter given to Patient/Family/Significant other/Guardian/POA/by[de-identified] staff  IMM Letter date given[de-identified] 12/19/21  IMM Letter time given[de-identified] 1783     Pt discharged 12/20/21 home with family, prior to SW assessment being completed.

## 2021-12-22 NOTE — PROGRESS NOTES
Post-Discharge Transitional Care Management Services      Name:  Quynh Verde   YOB: 1933    Date of Visit:  2021    No Known Allergies  Outpatient Medications Marked as Taking for the 21 encounter (Office Visit) with Joseph Angel MD   Medication Sig Dispense Refill    [] amoxicillin-clavulanate (AUGMENTIN) 875-125 MG per tablet Take 1 tablet by mouth 2 times daily for 6 days 14 tablet 0    [] azithromycin (ZITHROMAX) 250 MG tablet Take 1 tablet by mouth daily for 4 days 4 tablet 0    aspirin 81 MG EC tablet Take 1 tablet by mouth nightly Indications: Anticoagulant Therapy With food. 30 tablet 3    LORazepam (ATIVAN) 1 MG tablet Take by mouth nightly.        furosemide (LASIX) 40 MG tablet TAKE 1 TABLET BY MOUTH DAILY 90 tablet 3    donepezil (ARICEPT) 5 MG tablet TAKE 1 TABLET BY MOUTH EVERY NIGHT 90 tablet 5    atorvastatin (LIPITOR) 20 MG tablet TAKE 1 TABLET BY MOUTH ONE TIME A DAY AT NIGHT 90 tablet 2    allopurinol (ZYLOPRIM) 100 MG tablet TAKE 1 TABLET BY MOUTH EVERY MORNING 90 tablet 3    digoxin (DIGOX) 250 MCG tablet Take 1 tablet by mouth daily 90 tablet 3    carvedilol (COREG) 6.25 MG tablet Take 1 tablet by mouth 2 times daily 180 tablet 3    ENTRESTO 24-26 MG per tablet TAKE 1 TABLET BY MOUTH TWO TIMES A DAY  90 tablet 0    Melatonin 2.5 MG CHEW Take 1 tablet by mouth nightly      loperamide (IMODIUM) 2 MG capsule Take 2 mg by mouth as needed Indications: Diarrhea       Multiple Vitamins-Minerals (THERAPEUTIC MULTIVITAMIN-MINERALS) tablet Take 1 tablet by mouth 2 times daily Indications: Nutritional Support Noon and Supper      acetaminophen (TYLENOL) 325 MG tablet Take 650 mg by mouth every 6 hours as needed for Pain or Fever Indications: Pain Don't take more than 3,000 mg per day      Calcium Carbonate-Vitamin D (CALCIUM + D PO) Take 1 tablet by mouth 2 times daily Indications: Treatment to Prevent Vitamin Deficiency Noon and Supper Vitals:    12/22/21 1041   BP: 117/62   Site: Right Lower Arm   Position: Sitting   Cuff Size: Medium Adult   Pulse: 104   Temp: 97.6 °F (36.4 °C)   Weight: 174 lb (78.9 kg)   Height: 5' 10\" (1.778 m)     Body mass index is 24.97 kg/m². Wt Readings from Last 3 Encounters:   12/22/21 174 lb (78.9 kg)   12/19/21 172 lb 3.2 oz (78.1 kg)   08/19/21 181 lb (82.1 kg)     BP Readings from Last 3 Encounters:   12/22/21 117/62   12/20/21 110/61   08/19/21 132/74        Patient was admitted to [address] [place] [address] from 12/18 to 12/20 for community-acquired pneumonia. Patient presented with hemoptysis, worsening fatigue, cough  Inpatient course: Discharge summary reviewed- see chart. Patient was treated with a 5-day course of Zithromax 7-day course of Augmentin which she will complete as an outpatient  Given his hemoptysis he was told to hold his Coumadin and aspirin  Pulmonary was consulted and deemed him safe for discharge home  Current status: He says he is doing well feeling back to normal  Denies shortness of breath fevers chills hemoptysis    Review of Systems:  A comprehensive review of systems was negative except for what was noted in the HPI.     Physical Exam:  General Appearance: alert and oriented to person, place and time, well-developed and well-nourished, in no acute distress  Head: normocephalic and atraumatic  Neck: neck supple and non tender without mass, no thyromegaly or thyroid nodules, no cervical lymphadenopathy   Pulmonary/Chest: rhonchi present-bilaterally  Cardiovascular: normal rate, normal S1 and S2, intact distal pulses, no carotid bruits and S3 gallop present  Abdomen: soft, non-tender, non-distended, normal bowel sounds, no masses or organomegaly  Extremities: no cyanosis and no clubbing  Musculoskeletal: normal range of motion, no joint swelling, deformity or tenderness  Neurologic: gait and coordination normal and speech normal    I do not see that a post discharge communication was done with the patient  Assessment/Plan:  Bren Hargrove was seen today for follow-up from hospital.    Diagnoses and all orders for this visit:    Community acquired pneumonia, unspecified laterality    Permanent atrial fibrillation ([facility] [address]    Kaposi's sarcoma ([facility] [address]    Chronic systolic congestive heart failure (HCC)    Hyperlipidemia, unspecified hyperlipidemia type    S/P CABG (coronary artery bypass graft)    S/P AVR (aortic valve replacement)    Essential hypertension    ICD (implantable cardioverter-defibrillator) in place          Diagnostic test results reviewed: inpatient labs, EKG, chest x-ray and CT angiogram-Chest  I reviewed progress notes, consult notes, discharge summary  Patient risk of morbidity and mortality: moderate    Medical Decision Making: moderate complexity   I reviewed patient's medications and possible interactions and side effects. I refilled those that were needed.   Resume anticoagulation  Follow-up 3 months

## 2021-12-27 NOTE — PROGRESS NOTES
Medication Management [address]   [phone] (phone)  [phone] (fax)    Mr. Keenan Mayorga is a 80 y.o.  male with history of Afib who presents today for anticoagulation monitoring and adjustment. Patient verifies current dosing regimen and tablet strength. Patient was in the hospital 12/19/21 through 12/20/21 for pneumonia. Patient also had some hemoptysis. Patient's Coumadin was held 12/20/21 through 12/22/21, and Coumadin was restarted at normal dose on 12/23/21. Updated Anticoag Track. Patient has only had 4 days of Coumadin so far since restarting therapy. No missed or extra doses. Patient denies s/s bleeding/swelling/SOB/chest pain. Patient reports he has some bruising from his IV in the hospital, but this is improving. No blood in urine or stool. No dietary changes. No changes in OTC agents/Herbals. Patient took azithromycin x 4 days after discharge and Augmentin x 7 days after discharge. Today is his last day of Augmentin. No change in alcohol use or tobacco use. Patient's son reports he has been a little less active since hospitalization. Patient denies headaches/dizziness/lightheadedness/falls. No vomiting or acute illness. Patient reports he has diarrhea every once in a while at baseline, but this has not increased recently. No Procedures scheduled in the future at this time. Assessment:   Lab Results   Component Value Date    INR 1.80 (H) 12/27/2021    INR 2.90 (H) 12/20/2021    INR 2.73 (H) 12/19/2021     INR subtherapeutic   Recent Labs     12/27/21  1437   INR 1.80*       Plan:  Continue Coumadin 7.5 mg MF and 5 mg all other days. Recheck INR in 1.5 week(s) on 1/6. Patient reminded to call the Anticoagulation Clinic with any signs or symptoms of bleeding or with any medication changes. Patient given instructions utilizing the teach back method. After visit summary printed and reviewed with patient.       Discharged

## 2022-01-01 ENCOUNTER — APPOINTMENT (OUTPATIENT)
Dept: GENERAL RADIOLOGY | Age: 87
DRG: 871 | End: 2022-01-01
Payer: MEDICARE

## 2022-01-01 ENCOUNTER — HOSPITAL ENCOUNTER (OUTPATIENT)
Dept: PHARMACY | Age: 87
Setting detail: THERAPIES SERIES
Discharge: HOME | End: 2022-07-19
Payer: MEDICARE

## 2022-01-01 ENCOUNTER — PROCEDURE VISIT (OUTPATIENT)
Dept: CARDIOLOGY CLINIC | Age: 87
End: 2022-01-01
Payer: MEDICARE

## 2022-01-01 ENCOUNTER — APPOINTMENT (OUTPATIENT)
Dept: GENERAL RADIOLOGY | Age: 87
End: 2022-01-01
Payer: MEDICARE

## 2022-01-01 ENCOUNTER — NURSE ONLY (OUTPATIENT)
Dept: LAB | Age: 87
End: 2022-01-01

## 2022-01-01 ENCOUNTER — HOSPITAL ENCOUNTER (OUTPATIENT)
Dept: PHARMACY | Age: 87
Setting detail: THERAPIES SERIES
Discharge: HOME | End: 2022-04-12
Payer: MEDICARE

## 2022-01-01 ENCOUNTER — HOSPITAL ENCOUNTER (OUTPATIENT)
Dept: PHARMACY | Age: 87
Setting detail: THERAPIES SERIES
Discharge: HOME | End: 2022-09-06
Payer: MEDICARE

## 2022-01-01 ENCOUNTER — HOSPITAL ENCOUNTER (EMERGENCY)
Age: 87
Discharge: HOME | End: 2022-09-12
Payer: MEDICARE

## 2022-01-01 ENCOUNTER — TELEPHONE (OUTPATIENT)
Dept: INTERNAL MEDICINE CLINIC | Age: 87
End: 2022-01-01

## 2022-01-01 ENCOUNTER — HOSPITAL ENCOUNTER (OUTPATIENT)
Dept: PHARMACY | Age: 87
Setting detail: THERAPIES SERIES
Discharge: HOME | End: 2022-03-17
Payer: MEDICARE

## 2022-01-01 ENCOUNTER — HOSPITAL ENCOUNTER (OUTPATIENT)
Dept: PHARMACY | Age: 87
Setting detail: THERAPIES SERIES
Discharge: HOME | End: 2022-06-14
Payer: MEDICARE

## 2022-01-01 ENCOUNTER — NURSE ONLY (OUTPATIENT)
Dept: CARDIOLOGY CLINIC | Age: 87
End: 2022-01-01
Payer: MEDICARE

## 2022-01-01 ENCOUNTER — OFFICE VISIT (OUTPATIENT)
Dept: INTERNAL MEDICINE CLINIC | Age: 87
End: 2022-01-01
Payer: MEDICARE

## 2022-01-01 ENCOUNTER — APPOINTMENT (OUTPATIENT)
Dept: CT IMAGING | Age: 87
DRG: 871 | End: 2022-01-01
Payer: MEDICARE

## 2022-01-01 ENCOUNTER — HOSPITAL ENCOUNTER (INPATIENT)
Age: 87
LOS: 6 days | Discharge: HOSPICE | DRG: 871 | End: 2022-09-23
Attending: FAMILY MEDICINE | Admitting: INTERNAL MEDICINE
Payer: MEDICARE

## 2022-01-01 ENCOUNTER — HOSPITAL ENCOUNTER (OUTPATIENT)
Dept: PHARMACY | Age: 87
Setting detail: THERAPIES SERIES
Discharge: HOME | End: 2022-08-09
Payer: MEDICARE

## 2022-01-01 ENCOUNTER — TELEPHONE (OUTPATIENT)
Dept: PHARMACY | Age: 87
End: 2022-01-01

## 2022-01-01 ENCOUNTER — HOSPITAL ENCOUNTER (OUTPATIENT)
Dept: PHARMACY | Age: 87
Setting detail: THERAPIES SERIES
Discharge: HOME | End: 2022-02-24
Payer: MEDICARE

## 2022-01-01 ENCOUNTER — HOSPITAL ENCOUNTER (OUTPATIENT)
Dept: PHARMACY | Age: 87
Setting detail: THERAPIES SERIES
Discharge: HOME | End: 2022-01-06
Payer: MEDICARE

## 2022-01-01 ENCOUNTER — APPOINTMENT (OUTPATIENT)
Dept: PHARMACY | Age: 87
End: 2022-01-01
Payer: MEDICARE

## 2022-01-01 ENCOUNTER — HOSPITAL ENCOUNTER (OUTPATIENT)
Dept: PHARMACY | Age: 87
Setting detail: THERAPIES SERIES
Discharge: HOME | End: 2022-01-27
Payer: MEDICARE

## 2022-01-01 ENCOUNTER — HOSPITAL ENCOUNTER (OUTPATIENT)
Dept: PHARMACY | Age: 87
Setting detail: THERAPIES SERIES
Discharge: HOME | End: 2022-05-10
Payer: MEDICARE

## 2022-01-01 VITALS
BODY MASS INDEX: 26.34 KG/M2 | HEART RATE: 92 BPM | DIASTOLIC BLOOD PRESSURE: 67 MMHG | HEIGHT: 70 IN | TEMPERATURE: 98.6 F | DIASTOLIC BLOOD PRESSURE: 66 MMHG | OXYGEN SATURATION: 98 % | TEMPERATURE: 97.9 F | SYSTOLIC BLOOD PRESSURE: 136 MMHG | BODY MASS INDEX: 23.59 KG/M2 | WEIGHT: 164.8 LBS | HEART RATE: 68 BPM | RESPIRATION RATE: 16 BRPM | RESPIRATION RATE: 20 BRPM | WEIGHT: 167.8 LBS | SYSTOLIC BLOOD PRESSURE: 140 MMHG | HEIGHT: 67 IN

## 2022-01-01 VITALS
OXYGEN SATURATION: 96 % | SYSTOLIC BLOOD PRESSURE: 101 MMHG | DIASTOLIC BLOOD PRESSURE: 53 MMHG | WEIGHT: 170 LBS | BODY MASS INDEX: 26.68 KG/M2 | RESPIRATION RATE: 16 BRPM | TEMPERATURE: 97.7 F | HEIGHT: 67 IN | HEART RATE: 73 BPM

## 2022-01-01 VITALS
DIASTOLIC BLOOD PRESSURE: 62 MMHG | WEIGHT: 173 LBS | HEIGHT: 70 IN | HEART RATE: 77 BPM | BODY MASS INDEX: 24.77 KG/M2 | TEMPERATURE: 97.8 F | SYSTOLIC BLOOD PRESSURE: 128 MMHG

## 2022-01-01 DIAGNOSIS — C46.9 KAPOSI'S SARCOMA (HCC): ICD-10-CM

## 2022-01-01 DIAGNOSIS — R09.02 HYPOXIA: ICD-10-CM

## 2022-01-01 DIAGNOSIS — I50.22 CHRONIC SYSTOLIC CONGESTIVE HEART FAILURE (HCC): Primary | ICD-10-CM

## 2022-01-01 DIAGNOSIS — I50.22 CHRONIC SYSTOLIC CHF (CONGESTIVE HEART FAILURE) (HCC): Primary | ICD-10-CM

## 2022-01-01 DIAGNOSIS — Z51.81 ENCOUNTER FOR THERAPEUTIC DRUG MONITORING: ICD-10-CM

## 2022-01-01 DIAGNOSIS — Z95.2 S/P AVR (AORTIC VALVE REPLACEMENT): ICD-10-CM

## 2022-01-01 DIAGNOSIS — G31.84 MILD COGNITIVE IMPAIRMENT: ICD-10-CM

## 2022-01-01 DIAGNOSIS — I48.19 PERSISTENT ATRIAL FIBRILLATION (HCC): Primary | ICD-10-CM

## 2022-01-01 DIAGNOSIS — R63.4 WEIGHT LOSS: ICD-10-CM

## 2022-01-01 DIAGNOSIS — Z79.01 ANTICOAGULATED ON COUMADIN: ICD-10-CM

## 2022-01-01 DIAGNOSIS — Z95.810 AUTOMATIC IMPLANTABLE CARDIOVERTER-DEFIBRILLATOR IN SITU: Primary | ICD-10-CM

## 2022-01-01 DIAGNOSIS — I48.21 PERMANENT ATRIAL FIBRILLATION (HCC): ICD-10-CM

## 2022-01-01 DIAGNOSIS — F41.9 ANXIETY: ICD-10-CM

## 2022-01-01 DIAGNOSIS — Z95.1 S/P CABG (CORONARY ARTERY BYPASS GRAFT): ICD-10-CM

## 2022-01-01 DIAGNOSIS — Z95.810 ICD (IMPLANTABLE CARDIOVERTER-DEFIBRILLATOR) IN PLACE: ICD-10-CM

## 2022-01-01 DIAGNOSIS — R79.1 SUPRATHERAPEUTIC INR: ICD-10-CM

## 2022-01-01 DIAGNOSIS — E78.5 HYPERLIPIDEMIA, UNSPECIFIED HYPERLIPIDEMIA TYPE: ICD-10-CM

## 2022-01-01 DIAGNOSIS — J18.9 PNEUMONIA DUE TO INFECTIOUS ORGANISM, UNSPECIFIED LATERALITY, UNSPECIFIED PART OF LUNG: ICD-10-CM

## 2022-01-01 DIAGNOSIS — I10 ESSENTIAL HYPERTENSION: ICD-10-CM

## 2022-01-01 DIAGNOSIS — U07.1 COVID-19: Primary | ICD-10-CM

## 2022-01-01 DIAGNOSIS — S30.3XXA: Primary | ICD-10-CM

## 2022-01-01 DIAGNOSIS — Z87.39 H/O: GOUT: ICD-10-CM

## 2022-01-01 DIAGNOSIS — J18.9 COMMUNITY ACQUIRED PNEUMONIA, UNSPECIFIED LATERALITY: ICD-10-CM

## 2022-01-01 LAB
ACINETOBACTER CALCOACETICUS-BAUMANNII COMPLEX: NOT DETECTED
ALBUMIN SERPL-MCNC: 3.2 G/DL (ref 3.5–5.1)
ALBUMIN SERPL-MCNC: 3.3 G/DL (ref 3.5–5.1)
ALBUMIN SERPL-MCNC: 4.2 G/DL (ref 3.5–5.1)
ALP BLD-CCNC: 148 U/L (ref 38–126)
ALP BLD-CCNC: 149 U/L (ref 38–126)
ALP BLD-CCNC: 174 U/L (ref 38–126)
ALT SERPL-CCNC: 15 U/L (ref 11–66)
ALT SERPL-CCNC: 31 U/L (ref 11–66)
ALT SERPL-CCNC: 78 U/L (ref 11–66)
ANION GAP SERPL CALCULATED.3IONS-SCNC: 11 MEQ/L (ref 8–16)
ANION GAP SERPL CALCULATED.3IONS-SCNC: 11 MEQ/L (ref 8–16)
ANION GAP SERPL CALCULATED.3IONS-SCNC: 12 MEQ/L (ref 8–16)
ANION GAP SERPL CALCULATED.3IONS-SCNC: 12 MEQ/L (ref 8–16)
ANION GAP SERPL CALCULATED.3IONS-SCNC: 13 MEQ/L (ref 8–16)
ANION GAP SERPL CALCULATED.3IONS-SCNC: 7 MEQ/L (ref 8–16)
ANION GAP SERPL CALCULATED.3IONS-SCNC: 8 MEQ/L (ref 8–16)
ANION GAP SERPL CALCULATED.3IONS-SCNC: 9 MEQ/L (ref 8–16)
ANION GAP SERPL CALCULATED.3IONS-SCNC: 9 MEQ/L (ref 8–16)
ANISOCYTOSIS: PRESENT
ANTIBIOTIC RESISTANCE MARKER: VANCOMYCIN: VANA,B: NORMAL
APTT: 30.7 SECONDS (ref 22–38)
AST SERPL-CCNC: 127 U/L (ref 5–40)
AST SERPL-CCNC: 23 U/L (ref 5–40)
AST SERPL-CCNC: 54 U/L (ref 5–40)
BACTERIA: ABNORMAL /HPF
BACTEROIDES FRAGILIS: NOT DETECTED
BASE EXCESS MIXED: 1.2 MMOL/L (ref -2–3)
BASOPHILIA: ABNORMAL
BASOPHILS # BLD: 0.1 %
BASOPHILS # BLD: 0.1 %
BASOPHILS # BLD: 0.2 %
BASOPHILS # BLD: 0.3 %
BASOPHILS # BLD: 0.3 %
BASOPHILS ABSOLUTE: 0 THOU/MM3 (ref 0–0.1)
BASOPHILS ABSOLUTE: 0.1 THOU/MM3 (ref 0–0.1)
BILIRUB SERPL-MCNC: 1 MG/DL (ref 0.3–1.2)
BILIRUB SERPL-MCNC: 1.6 MG/DL (ref 0.3–1.2)
BILIRUB SERPL-MCNC: 2.3 MG/DL (ref 0.3–1.2)
BILIRUBIN DIRECT: 0.7 MG/DL (ref 0–0.3)
BILIRUBIN URINE: NEGATIVE
BLOOD CULTURE, ROUTINE: ABNORMAL
BLOOD CULTURE, ROUTINE: ABNORMAL
BLOOD CULTURE, ROUTINE: NORMAL
BLOOD, URINE: NEGATIVE
BOTTLE TYPE: NORMAL
BUN BLDV-MCNC: 17 MG/DL (ref 7–22)
BUN BLDV-MCNC: 17 MG/DL (ref 7–22)
BUN BLDV-MCNC: 23 MG/DL (ref 7–22)
BUN BLDV-MCNC: 24 MG/DL (ref 7–22)
BUN BLDV-MCNC: 26 MG/DL (ref 7–22)
BUN BLDV-MCNC: 26 MG/DL (ref 7–22)
BUN BLDV-MCNC: 31 MG/DL (ref 7–22)
BUN BLDV-MCNC: 44 MG/DL (ref 7–22)
BUN BLDV-MCNC: 50 MG/DL (ref 7–22)
C-REACTIVE PROTEIN: 14.21 MG/DL (ref 0–1)
C-REACTIVE PROTEIN: 8.79 MG/DL (ref 0–1)
C-REACTIVE PROTEIN: 9.12 MG/DL (ref 0–1)
CALCIUM IONIZED: 1.08 MMOL/L (ref 1.12–1.32)
CALCIUM IONIZED: 1.16 MMOL/L (ref 1.12–1.32)
CALCIUM SERPL-MCNC: 8.3 MG/DL (ref 8.5–10.5)
CALCIUM SERPL-MCNC: 8.3 MG/DL (ref 8.5–10.5)
CALCIUM SERPL-MCNC: 8.4 MG/DL (ref 8.5–10.5)
CALCIUM SERPL-MCNC: 8.6 MG/DL (ref 8.5–10.5)
CALCIUM SERPL-MCNC: 8.9 MG/DL (ref 8.5–10.5)
CALCIUM SERPL-MCNC: 9 MG/DL (ref 8.5–10.5)
CALCIUM SERPL-MCNC: 9.6 MG/DL (ref 8.5–10.5)
CANDIDA ALBICANS: NOT DETECTED
CANDIDA AURIS: NOT DETECTED
CANDIDA GLABRATA: NOT DETECTED
CANDIDA KRUSEI: NOT DETECTED
CANDIDA PARAPSILOSIS: NOT DETECTED
CANDIDA TROPICALIS: NOT DETECTED
CASTS 2: ABNORMAL /LPF
CASTS UA: ABNORMAL /LPF
CHARACTER, URINE: ABNORMAL
CHLORIDE BLD-SCNC: 100 MEQ/L (ref 98–111)
CHLORIDE BLD-SCNC: 101 MEQ/L (ref 98–111)
CHLORIDE BLD-SCNC: 102 MEQ/L (ref 98–111)
CHLORIDE BLD-SCNC: 102 MEQ/L (ref 98–111)
CHLORIDE BLD-SCNC: 103 MEQ/L (ref 98–111)
CHLORIDE BLD-SCNC: 104 MEQ/L (ref 98–111)
CHLORIDE BLD-SCNC: 105 MEQ/L (ref 98–111)
CHLORIDE BLD-SCNC: 105 MEQ/L (ref 98–111)
CHLORIDE BLD-SCNC: 106 MEQ/L (ref 98–111)
CO2: 25 MEQ/L (ref 23–33)
CO2: 26 MEQ/L (ref 23–33)
CO2: 28 MEQ/L (ref 23–33)
CO2: 29 MEQ/L (ref 23–33)
CO2: 29 MEQ/L (ref 23–33)
CO2: 30 MEQ/L (ref 23–33)
CO2: 30 MEQ/L (ref 23–33)
COLLECTED BY:: ABNORMAL
COLOR: ABNORMAL
CORTISOL COLLECTION INFO: NORMAL
CORTISOL: 27.74 UG/DL
CREAT SERPL-MCNC: 0.8 MG/DL (ref 0.4–1.2)
CREAT SERPL-MCNC: 0.9 MG/DL (ref 0.4–1.2)
CREAT SERPL-MCNC: 1 MG/DL (ref 0.4–1.2)
CRENATED RBC'S: ABNORMAL
CRYPTOCOCCUS NEOFORMANS/GATTII: NOT DETECTED
CRYSTALS, UA: ABNORMAL
CTX-M: NORMAL
DEVICE: ABNORMAL
DIGOXIN LEVEL: 1.8 NG/ML (ref 0.5–2)
EKG ATRIAL RATE: 80 BPM
EKG Q-T INTERVAL: 516 MS
EKG QRS DURATION: 190 MS
EKG QTC CALCULATION (BAZETT): 580 MS
EKG R AXIS: -79 DEGREES
EKG T AXIS: 102 DEGREES
EKG VENTRICULAR RATE: 76 BPM
ENTEROBACTER CLOACAE COMPLEX: NOT DETECTED
ENTEROBACTERALES: NOT DETECTED
ENTEROCOCCUS FAECALIS: NOT DETECTED
ENTEROCOCCUS FAECIUM: NOT DETECTED
EOSINOPHIL # BLD: 0 %
EOSINOPHIL # BLD: 0 %
EOSINOPHIL # BLD: 0.1 %
EOSINOPHIL # BLD: 0.2 %
EOSINOPHIL # BLD: 0.6 %
EOSINOPHILS ABSOLUTE: 0 THOU/MM3 (ref 0–0.4)
EPITHELIAL CELLS, UA: ABNORMAL /HPF
ERYTHROCYTE [DISTWIDTH] IN BLOOD BY AUTOMATED COUNT: 16.5 % (ref 11.5–14.5)
ERYTHROCYTE [DISTWIDTH] IN BLOOD BY AUTOMATED COUNT: 17.1 % (ref 11.5–14.5)
ERYTHROCYTE [DISTWIDTH] IN BLOOD BY AUTOMATED COUNT: 17.2 % (ref 11.5–14.5)
ERYTHROCYTE [DISTWIDTH] IN BLOOD BY AUTOMATED COUNT: 17.2 % (ref 11.5–14.5)
ERYTHROCYTE [DISTWIDTH] IN BLOOD BY AUTOMATED COUNT: 17.4 % (ref 11.5–14.5)
ERYTHROCYTE [DISTWIDTH] IN BLOOD BY AUTOMATED COUNT: 51.9 FL (ref 35–45)
ERYTHROCYTE [DISTWIDTH] IN BLOOD BY AUTOMATED COUNT: 52 FL (ref 35–45)
ERYTHROCYTE [DISTWIDTH] IN BLOOD BY AUTOMATED COUNT: 52.3 FL (ref 35–45)
ERYTHROCYTE [DISTWIDTH] IN BLOOD BY AUTOMATED COUNT: 52.9 FL (ref 35–45)
ERYTHROCYTE [DISTWIDTH] IN BLOOD BY AUTOMATED COUNT: 53.6 FL (ref 35–45)
ESCHERICHIA COLI: NOT DETECTED
FERRITIN: 492 NG/ML (ref 22–322)
FIBRINOGEN: 445 MG/100ML (ref 155–475)
FIO2, MIXED VENOUS: 60
FLU A ANTIGEN: NEGATIVE
FLU B ANTIGEN: NEGATIVE
GFR SERPL CREATININE-BSD FRML MDRD: 70 ML/MIN/1.73M2
GFR SERPL CREATININE-BSD FRML MDRD: 79 ML/MIN/1.73M2
GFR SERPL CREATININE-BSD FRML MDRD: > 90 ML/MIN/1.73M2
GLUCOSE BLD-MCNC: 103 MG/DL (ref 70–108)
GLUCOSE BLD-MCNC: 105 MG/DL (ref 70–108)
GLUCOSE BLD-MCNC: 109 MG/DL (ref 70–108)
GLUCOSE BLD-MCNC: 110 MG/DL (ref 70–108)
GLUCOSE BLD-MCNC: 114 MG/DL (ref 70–108)
GLUCOSE BLD-MCNC: 118 MG/DL (ref 70–108)
GLUCOSE BLD-MCNC: 136 MG/DL (ref 70–108)
GLUCOSE BLD-MCNC: 163 MG/DL (ref 70–108)
GLUCOSE BLD-MCNC: 91 MG/DL (ref 70–108)
GLUCOSE BLD-MCNC: 94 MG/DL (ref 70–108)
GLUCOSE URINE: NEGATIVE MG/DL
HAEMOPHILUS INFLUENZAE: NOT DETECTED
HCO3, MIXED: 28 MMOL/L (ref 23–28)
HCT VFR BLD CALC: 28.8 % (ref 42–52)
HCT VFR BLD CALC: 30 % (ref 42–52)
HCT VFR BLD CALC: 30.4 % (ref 42–52)
HCT VFR BLD CALC: 30.5 % (ref 42–52)
HCT VFR BLD CALC: 32.1 % (ref 42–52)
HCT VFR BLD CALC: 33.4 % (ref 42–52)
HEMOGLOBIN: 10 GM/DL (ref 14–18)
HEMOGLOBIN: 10.4 GM/DL (ref 14–18)
HEMOGLOBIN: 10.8 GM/DL (ref 14–18)
HEMOGLOBIN: 11.2 GM/DL (ref 14–18)
HEMOGLOBIN: 9.5 GM/DL (ref 14–18)
HEMOGLOBIN: 9.7 GM/DL (ref 14–18)
HEMOGLOBIN: 9.9 GM/DL (ref 14–18)
IMMATURE GRANS (ABS): 0.01 THOU/MM3 (ref 0–0.07)
IMMATURE GRANS (ABS): 0.16 THOU/MM3 (ref 0–0.07)
IMMATURE GRANS (ABS): 0.18 THOU/MM3 (ref 0–0.07)
IMMATURE GRANS (ABS): 0.19 THOU/MM3 (ref 0–0.07)
IMMATURE GRANS (ABS): 0.28 THOU/MM3 (ref 0–0.07)
IMMATURE GRANULOCYTES: 0.2 %
IMMATURE GRANULOCYTES: 1.1 %
IMMATURE GRANULOCYTES: 1.7 %
IMP: NORMAL
INR BLD: 2.96 (ref 0.85–1.13)
INR BLD: 3.09 (ref 0.85–1.13)
INR BLD: 3.34 (ref 0.85–1.13)
INR BLD: 4.23 (ref 0.85–1.13)
INR BLD: 5.72 (ref 0.85–1.13)
KETONES, URINE: NEGATIVE
KLEBSIELLA AEROGENES: NOT DETECTED
KLEBSIELLA OXYTOCA: NOT DETECTED
KLEBSIELLA PNEUMONIAE GROUP: NOT DETECTED
KPC (CARBAPENEM RESISTANCE GENE): NORMAL
LACTIC ACID, SEPSIS: 1.5 MMOL/L (ref 0.5–1.9)
LACTIC ACID, SEPSIS: 2 MMOL/L (ref 0.5–1.9)
LD: 314 U/L (ref 100–190)
LEUKOCYTE ESTERASE, URINE: NEGATIVE
LIPASE: 21.8 U/L (ref 5.6–51.3)
LISTERIA MONOCYTOGENES: NOT DETECTED
LV EF: 35 %
LVEF MODALITY: NORMAL
LYMPHOCYTES # BLD: 10.7 %
LYMPHOCYTES # BLD: 3.4 %
LYMPHOCYTES # BLD: 4.2 %
LYMPHOCYTES # BLD: 5.3 %
LYMPHOCYTES # BLD: 7.4 %
LYMPHOCYTES ABSOLUTE: 0.6 THOU/MM3 (ref 1–4.8)
LYMPHOCYTES ABSOLUTE: 0.7 THOU/MM3 (ref 1–4.8)
LYMPHOCYTES ABSOLUTE: 0.8 THOU/MM3 (ref 1–4.8)
LYMPHOCYTES ABSOLUTE: 0.9 THOU/MM3 (ref 1–4.8)
LYMPHOCYTES ABSOLUTE: 0.9 THOU/MM3 (ref 1–4.8)
MCH RBC QN AUTO: 27.7 PG (ref 26–33)
MCH RBC QN AUTO: 27.8 PG (ref 26–33)
MCH RBC QN AUTO: 27.9 PG (ref 26–33)
MCH RBC QN AUTO: 28.2 PG (ref 26–33)
MCH RBC QN AUTO: 28.3 PG (ref 26–33)
MCHC RBC AUTO-ENTMCNC: 31.9 GM/DL (ref 32.2–35.5)
MCHC RBC AUTO-ENTMCNC: 32.3 GM/DL (ref 32.2–35.5)
MCHC RBC AUTO-ENTMCNC: 32.4 GM/DL (ref 32.2–35.5)
MCHC RBC AUTO-ENTMCNC: 33 GM/DL (ref 32.2–35.5)
MCHC RBC AUTO-ENTMCNC: 33 GM/DL (ref 32.2–35.5)
MCR-1: NORMAL
MCV RBC AUTO: 84.5 FL (ref 80–94)
MCV RBC AUTO: 85.7 FL (ref 80–94)
MCV RBC AUTO: 85.9 FL (ref 80–94)
MCV RBC AUTO: 86.9 FL (ref 80–94)
MCV RBC AUTO: 87 FL (ref 80–94)
MECA/C AND MREJ (MRSA): NORMAL
MECA/C: NORMAL
MISCELLANEOUS 2: ABNORMAL
MONOCYTES # BLD: 2.1 %
MONOCYTES # BLD: 2.7 %
MONOCYTES # BLD: 3.4 %
MONOCYTES # BLD: 7.3 %
MONOCYTES # BLD: 7.4 %
MONOCYTES ABSOLUTE: 0.4 THOU/MM3 (ref 0.4–1.3)
MONOCYTES ABSOLUTE: 0.5 THOU/MM3 (ref 0.4–1.3)
MONOCYTES ABSOLUTE: 0.5 THOU/MM3 (ref 0.4–1.3)
MONOCYTES ABSOLUTE: 0.6 THOU/MM3 (ref 0.4–1.3)
MONOCYTES ABSOLUTE: 0.8 THOU/MM3 (ref 0.4–1.3)
MRSA SCREEN RT-PCR: NEGATIVE
MRSA SCREEN: NORMAL
NDM: NORMAL
NDM: NOT DETECTED
NITRITE, URINE: NEGATIVE
NUCLEATED RED BLOOD CELLS: 0 /100 WBC
O2 SAT, MIXED: 76 %
ORGANISM: ABNORMAL
OSMOLALITY CALCULATION: 283.2 MOSMOL/KG (ref 275–300)
OXA-48-LIKE: NORMAL
PCO2, MIXED VENOUS: 50 MMHG (ref 41–51)
PH UA: 5 (ref 5–9)
PH, MIXED: 7.35 (ref 7.31–7.41)
PLATELET # BLD: 154 THOU/MM3 (ref 130–400)
PLATELET # BLD: 194 THOU/MM3 (ref 130–400)
PLATELET # BLD: 197 THOU/MM3 (ref 130–400)
PLATELET # BLD: 233 THOU/MM3 (ref 130–400)
PLATELET # BLD: 302 THOU/MM3 (ref 130–400)
PMV BLD AUTO: 10 FL (ref 9.4–12.4)
PMV BLD AUTO: 10.3 FL (ref 9.4–12.4)
PMV BLD AUTO: 10.6 FL (ref 9.4–12.4)
PMV BLD AUTO: 10.7 FL (ref 9.4–12.4)
PMV BLD AUTO: 10.8 FL (ref 9.4–12.4)
PO2 MIXED: 44 MMHG (ref 25–40)
POC INR: 1.7 (ref 0.8–1.2)
POC INR: 2 (ref 0.8–1.2)
POC INR: 2.1 (ref 0.8–1.2)
POC INR: 2.2 (ref 0.8–1.2)
POC INR: 2.4 (ref 0.8–1.2)
POC INR: 2.5 (ref 0.8–1.2)
POC INR: 2.5 (ref 0.8–1.2)
POC INR: 3.1 (ref 0.8–1.2)
POIKILOCYTES: ABNORMAL
POTASSIUM REFLEX MAGNESIUM: 4.4 MEQ/L (ref 3.5–5.2)
POTASSIUM SERPL-SCNC: 3.8 MEQ/L (ref 3.5–5.2)
POTASSIUM SERPL-SCNC: 3.9 MEQ/L (ref 3.5–5.2)
POTASSIUM SERPL-SCNC: 3.9 MEQ/L (ref 3.5–5.2)
POTASSIUM SERPL-SCNC: 4.1 MEQ/L (ref 3.5–5.2)
POTASSIUM SERPL-SCNC: 4.4 MEQ/L (ref 3.5–5.2)
PRO-BNP: 6799 PG/ML (ref 0–1800)
PROCALCITONIN: 0.42 NG/ML (ref 0.01–0.09)
PROCALCITONIN: 2.83 NG/ML (ref 0.01–0.09)
PROCALCITONIN: 4.72 NG/ML (ref 0.01–0.09)
PROTEIN UA: 100
PROTEUS SPP: NOT DETECTED
PSEUDOMONAS AERUGINOSA: NOT DETECTED
RBC # BLD: 3.41 MILL/MM3 (ref 4.7–6.1)
RBC # BLD: 3.5 MILL/MM3 (ref 4.7–6.1)
RBC # BLD: 3.5 MILL/MM3 (ref 4.7–6.1)
RBC # BLD: 3.69 MILL/MM3 (ref 4.7–6.1)
RBC # BLD: 3.89 MILL/MM3 (ref 4.7–6.1)
RBC URINE: ABNORMAL /HPF
RENAL EPITHELIAL, UA: ABNORMAL
SALMONELLA SPECIES: NOT DETECTED
SARS-COV-2, NAAT: DETECTED
SCAN OF BLOOD SMEAR: NORMAL
SEG NEUTROPHILS: 80.8 %
SEG NEUTROPHILS: 83.1 %
SEG NEUTROPHILS: 90.1 %
SEG NEUTROPHILS: 91.9 %
SEG NEUTROPHILS: 93.1 %
SEGMENTED NEUTROPHILS ABSOLUTE COUNT: 13 THOU/MM3 (ref 1.8–7.7)
SEGMENTED NEUTROPHILS ABSOLUTE COUNT: 15.2 THOU/MM3 (ref 1.8–7.7)
SEGMENTED NEUTROPHILS ABSOLUTE COUNT: 24.3 THOU/MM3 (ref 1.8–7.7)
SEGMENTED NEUTROPHILS ABSOLUTE COUNT: 5.3 THOU/MM3 (ref 1.8–7.7)
SEGMENTED NEUTROPHILS ABSOLUTE COUNT: 8.9 THOU/MM3 (ref 1.8–7.7)
SERRATIA MARCESCENS: NOT DETECTED
SITE: ABNORMAL
SODIUM BLD-SCNC: 138 MEQ/L (ref 135–145)
SODIUM BLD-SCNC: 139 MEQ/L (ref 135–145)
SODIUM BLD-SCNC: 139 MEQ/L (ref 135–145)
SODIUM BLD-SCNC: 140 MEQ/L (ref 135–145)
SODIUM BLD-SCNC: 141 MEQ/L (ref 135–145)
SODIUM BLD-SCNC: 147 MEQ/L (ref 135–145)
SOURCE OF BLOOD CULTURE: NORMAL
SPECIFIC GRAVITY, URINE: 1.01 (ref 1–1.03)
STAPHYLOCOCCUS AUREUS: NOT DETECTED
STAPHYLOCOCCUS EPIDERMIDIS: NOT DETECTED
STAPHYLOCOCCUS LUGDUNENSIS: NOT DETECTED
STAPHYLOCOCCUS SPP: NOT DETECTED
STENOTROPHOMONAS MALTOPHILIA: NOT DETECTED
STREPTOCOCCUS AGALACTIAE (GROUP B): NOT DETECTED
STREPTOCOCCUS PNEUMONIAE: NOT DETECTED
STREPTOCOCCUS PYOGENES (GROUP A): NOT DETECTED
STREPTOCOCCUS SPP: NOT DETECTED
TOTAL PROTEIN: 5.3 G/DL (ref 6.1–8)
TOTAL PROTEIN: 6.3 G/DL (ref 6.1–8)
TOTAL PROTEIN: 6.9 G/DL (ref 6.1–8)
TOXIC GRANULATION: PRESENT
TROPONIN T: 0.02 NG/ML
TROPONIN T: 0.02 NG/ML
URINE CULTURE REFLEX: NORMAL
URINE CULTURE, ROUTINE: NORMAL
UROBILINOGEN, URINE: 1 EU/DL (ref 0–1)
VANCOMYCIN RESISTANT ENTEROCOCCUS: NEGATIVE
VIM: NORMAL
VITAMIN D 25-HYDROXY: 51 NG/ML (ref 30–100)
WBC # BLD: 10.7 THOU/MM3 (ref 4.8–10.8)
WBC # BLD: 14.2 THOU/MM3 (ref 4.8–10.8)
WBC # BLD: 16.9 THOU/MM3 (ref 4.8–10.8)
WBC # BLD: 26.1 THOU/MM3 (ref 4.8–10.8)
WBC # BLD: 6.5 THOU/MM3 (ref 4.8–10.8)
WBC UA: ABNORMAL /HPF
YEAST: ABNORMAL

## 2022-01-01 PROCEDURE — 2700000000 HC OXYGEN THERAPY PER DAY

## 2022-01-01 PROCEDURE — 2500000003 HC RX 250 WO HCPCS: Performed by: INTERNAL MEDICINE

## 2022-01-01 PROCEDURE — 99211 OFF/OP EST MAY X REQ PHY/QHP: CPT

## 2022-01-01 PROCEDURE — 2500000003 HC RX 250 WO HCPCS

## 2022-01-01 PROCEDURE — 82803 BLOOD GASES ANY COMBINATION: CPT

## 2022-01-01 PROCEDURE — 94761 N-INVAS EAR/PLS OXIMETRY MLT: CPT

## 2022-01-01 PROCEDURE — 84145 PROCALCITONIN (PCT): CPT

## 2022-01-01 PROCEDURE — 86140 C-REACTIVE PROTEIN: CPT

## 2022-01-01 PROCEDURE — 2500000003 HC RX 250 WO HCPCS: Performed by: NURSE PRACTITIONER

## 2022-01-01 PROCEDURE — 80048 BASIC METABOLIC PNL TOTAL CA: CPT

## 2022-01-01 PROCEDURE — 73502 X-RAY EXAM HIP UNI 2-3 VIEWS: CPT

## 2022-01-01 PROCEDURE — 83615 LACTATE (LD) (LDH) ENZYME: CPT

## 2022-01-01 PROCEDURE — 36416 COLLJ CAPILLARY BLOOD SPEC: CPT

## 2022-01-01 PROCEDURE — 93282 PRGRMG EVAL IMPLANTABLE DFB: CPT | Performed by: INTERNAL MEDICINE

## 2022-01-01 PROCEDURE — 71045 X-RAY EXAM CHEST 1 VIEW: CPT

## 2022-01-01 PROCEDURE — 81001 URINALYSIS AUTO W/SCOPE: CPT

## 2022-01-01 PROCEDURE — 2580000003 HC RX 258

## 2022-01-01 PROCEDURE — 85384 FIBRINOGEN ACTIVITY: CPT

## 2022-01-01 PROCEDURE — 2000000000 HC ICU R&B

## 2022-01-01 PROCEDURE — 6370000000 HC RX 637 (ALT 250 FOR IP)

## 2022-01-01 PROCEDURE — 99233 SBSQ HOSP IP/OBS HIGH 50: CPT | Performed by: STUDENT IN AN ORGANIZED HEALTH CARE EDUCATION/TRAINING PROGRAM

## 2022-01-01 PROCEDURE — 1200000000 HC SEMI PRIVATE

## 2022-01-01 PROCEDURE — 99211 OFF/OP EST MAY X REQ PHY/QHP: CPT | Performed by: PHARMACIST

## 2022-01-01 PROCEDURE — 6360000002 HC RX W HCPCS: Performed by: STUDENT IN AN ORGANIZED HEALTH CARE EDUCATION/TRAINING PROGRAM

## 2022-01-01 PROCEDURE — 2500000003 HC RX 250 WO HCPCS: Performed by: STUDENT IN AN ORGANIZED HEALTH CARE EDUCATION/TRAINING PROGRAM

## 2022-01-01 PROCEDURE — 83605 ASSAY OF LACTIC ACID: CPT

## 2022-01-01 PROCEDURE — G8484 FLU IMMUNIZE NO ADMIN: HCPCS | Performed by: INTERNAL MEDICINE

## 2022-01-01 PROCEDURE — 99232 SBSQ HOSP IP/OBS MODERATE 35: CPT | Performed by: INTERNAL MEDICINE

## 2022-01-01 PROCEDURE — 96366 THER/PROPH/DIAG IV INF ADDON: CPT

## 2022-01-01 PROCEDURE — 2060000000 HC ICU INTERMEDIATE R&B

## 2022-01-01 PROCEDURE — G2066 INTER DEVC REMOTE 30D: HCPCS | Performed by: INTERNAL MEDICINE

## 2022-01-01 PROCEDURE — 80162 ASSAY OF DIGOXIN TOTAL: CPT

## 2022-01-01 PROCEDURE — 93297 REM INTERROG DEV EVAL ICPMS: CPT | Performed by: INTERNAL MEDICINE

## 2022-01-01 PROCEDURE — G8420 CALC BMI NORM PARAMETERS: HCPCS | Performed by: INTERNAL MEDICINE

## 2022-01-01 PROCEDURE — 2580000003 HC RX 258: Performed by: NURSE PRACTITIONER

## 2022-01-01 PROCEDURE — 93306 TTE W/DOPPLER COMPLETE: CPT

## 2022-01-01 PROCEDURE — 83690 ASSAY OF LIPASE: CPT

## 2022-01-01 PROCEDURE — 85610 PROTHROMBIN TIME: CPT | Performed by: PHARMACIST

## 2022-01-01 PROCEDURE — 85610 PROTHROMBIN TIME: CPT

## 2022-01-01 PROCEDURE — 70450 CT HEAD/BRAIN W/O DYE: CPT

## 2022-01-01 PROCEDURE — 6360000002 HC RX W HCPCS: Performed by: NURSE PRACTITIONER

## 2022-01-01 PROCEDURE — 99291 CRITICAL CARE FIRST HOUR: CPT | Performed by: INTERNAL MEDICINE

## 2022-01-01 PROCEDURE — 97163 PT EVAL HIGH COMPLEX 45 MIN: CPT

## 2022-01-01 PROCEDURE — 93005 ELECTROCARDIOGRAM TRACING: CPT | Performed by: STUDENT IN AN ORGANIZED HEALTH CARE EDUCATION/TRAINING PROGRAM

## 2022-01-01 PROCEDURE — 80053 COMPREHEN METABOLIC PANEL: CPT

## 2022-01-01 PROCEDURE — 36416 COLLJ CAPILLARY BLOOD SPEC: CPT | Performed by: PHARMACIST

## 2022-01-01 PROCEDURE — 93296 REM INTERROG EVL PM/IDS: CPT | Performed by: INTERNAL MEDICINE

## 2022-01-01 PROCEDURE — 85025 COMPLETE CBC W/AUTO DIFF WBC: CPT

## 2022-01-01 PROCEDURE — 82306 VITAMIN D 25 HYDROXY: CPT

## 2022-01-01 PROCEDURE — 1123F ACP DISCUSS/DSCN MKR DOCD: CPT | Performed by: INTERNAL MEDICINE

## 2022-01-01 PROCEDURE — 97530 THERAPEUTIC ACTIVITIES: CPT

## 2022-01-01 PROCEDURE — 99239 HOSP IP/OBS DSCHRG MGMT >30: CPT | Performed by: STUDENT IN AN ORGANIZED HEALTH CARE EDUCATION/TRAINING PROGRAM

## 2022-01-01 PROCEDURE — 80076 HEPATIC FUNCTION PANEL: CPT

## 2022-01-01 PROCEDURE — 36415 COLL VENOUS BLD VENIPUNCTURE: CPT

## 2022-01-01 PROCEDURE — 93295 DEV INTERROG REMOTE 1/2/MLT: CPT | Performed by: INTERNAL MEDICINE

## 2022-01-01 PROCEDURE — 99233 SBSQ HOSP IP/OBS HIGH 50: CPT | Performed by: INTERNAL MEDICINE

## 2022-01-01 PROCEDURE — 96365 THER/PROPH/DIAG IV INF INIT: CPT

## 2022-01-01 PROCEDURE — 87500 VANOMYCIN DNA AMP PROBE: CPT

## 2022-01-01 PROCEDURE — 99214 OFFICE O/P EST MOD 30 MIN: CPT | Performed by: INTERNAL MEDICINE

## 2022-01-01 PROCEDURE — G2066 INTER DEVC REMOTE 30D: HCPCS | Performed by: NUCLEAR MEDICINE

## 2022-01-01 PROCEDURE — 82533 TOTAL CORTISOL: CPT

## 2022-01-01 PROCEDURE — 87641 MR-STAPH DNA AMP PROBE: CPT

## 2022-01-01 PROCEDURE — 99284 EMERGENCY DEPT VISIT MOD MDM: CPT

## 2022-01-01 PROCEDURE — APPNB180 APP NON BILLABLE TIME > 60 MINS: Performed by: NURSE PRACTITIONER

## 2022-01-01 PROCEDURE — 93010 ELECTROCARDIOGRAM REPORT: CPT | Performed by: INTERNAL MEDICINE

## 2022-01-01 PROCEDURE — 6370000000 HC RX 637 (ALT 250 FOR IP): Performed by: STUDENT IN AN ORGANIZED HEALTH CARE EDUCATION/TRAINING PROGRAM

## 2022-01-01 PROCEDURE — 82330 ASSAY OF CALCIUM: CPT

## 2022-01-01 PROCEDURE — 2580000003 HC RX 258: Performed by: STUDENT IN AN ORGANIZED HEALTH CARE EDUCATION/TRAINING PROGRAM

## 2022-01-01 PROCEDURE — 4040F PNEUMOC VAC/ADMIN/RCVD: CPT | Performed by: INTERNAL MEDICINE

## 2022-01-01 PROCEDURE — 82728 ASSAY OF FERRITIN: CPT

## 2022-01-01 PROCEDURE — 6370000000 HC RX 637 (ALT 250 FOR IP): Performed by: NURSE PRACTITIONER

## 2022-01-01 PROCEDURE — 87147 CULTURE TYPE IMMUNOLOGIC: CPT

## 2022-01-01 PROCEDURE — 99285 EMERGENCY DEPT VISIT HI MDM: CPT

## 2022-01-01 PROCEDURE — 85018 HEMOGLOBIN: CPT

## 2022-01-01 PROCEDURE — 87804 INFLUENZA ASSAY W/OPTIC: CPT

## 2022-01-01 PROCEDURE — 6360000002 HC RX W HCPCS: Performed by: PHYSICIAN ASSISTANT

## 2022-01-01 PROCEDURE — 92610 EVALUATE SWALLOWING FUNCTION: CPT

## 2022-01-01 PROCEDURE — G8427 DOCREV CUR MEDS BY ELIG CLIN: HCPCS | Performed by: INTERNAL MEDICINE

## 2022-01-01 PROCEDURE — 87086 URINE CULTURE/COLONY COUNT: CPT

## 2022-01-01 PROCEDURE — 74230 X-RAY XM SWLNG FUNCJ C+: CPT

## 2022-01-01 PROCEDURE — 87801 DETECT AGNT MULT DNA AMPLI: CPT

## 2022-01-01 PROCEDURE — 94660 CPAP INITIATION&MGMT: CPT

## 2022-01-01 PROCEDURE — 6360000002 HC RX W HCPCS

## 2022-01-01 PROCEDURE — 87635 SARS-COV-2 COVID-19 AMP PRB: CPT

## 2022-01-01 PROCEDURE — 83880 ASSAY OF NATRIURETIC PEPTIDE: CPT

## 2022-01-01 PROCEDURE — 87040 BLOOD CULTURE FOR BACTERIA: CPT

## 2022-01-01 PROCEDURE — 93297 REM INTERROG DEV EVAL ICPMS: CPT | Performed by: NUCLEAR MEDICINE

## 2022-01-01 PROCEDURE — 02HV33Z INSERTION OF INFUSION DEVICE INTO SUPERIOR VENA CAVA, PERCUTANEOUS APPROACH: ICD-10-PCS | Performed by: STUDENT IN AN ORGANIZED HEALTH CARE EDUCATION/TRAINING PROGRAM

## 2022-01-01 PROCEDURE — 87081 CULTURE SCREEN ONLY: CPT

## 2022-01-01 PROCEDURE — 92611 MOTION FLUOROSCOPY/SWALLOW: CPT

## 2022-01-01 PROCEDURE — 85014 HEMATOCRIT: CPT

## 2022-01-01 PROCEDURE — 84484 ASSAY OF TROPONIN QUANT: CPT

## 2022-01-01 PROCEDURE — 82948 REAGENT STRIP/BLOOD GLUCOSE: CPT

## 2022-01-01 PROCEDURE — 93290 INTERROG DEV EVAL ICPMS IP: CPT | Performed by: INTERNAL MEDICINE

## 2022-01-01 PROCEDURE — 85730 THROMBOPLASTIN TIME PARTIAL: CPT

## 2022-01-01 PROCEDURE — 1036F TOBACCO NON-USER: CPT | Performed by: INTERNAL MEDICINE

## 2022-01-01 RX ORDER — CARVEDILOL 6.25 MG/1
6.25 TABLET ORAL 2 TIMES DAILY
Qty: 180 TABLET | Refills: 3 | Status: ON HOLD | OUTPATIENT
Start: 2022-01-01 | End: 2022-01-01 | Stop reason: HOSPADM

## 2022-01-01 RX ORDER — DONEPEZIL HYDROCHLORIDE 5 MG/1
TABLET, FILM COATED ORAL
Qty: 90 TABLET | Refills: 3 | Status: ON HOLD | OUTPATIENT
Start: 2022-01-01 | End: 2022-01-01 | Stop reason: HOSPADM

## 2022-01-01 RX ORDER — DONEPEZIL HYDROCHLORIDE 10 MG/1
5 TABLET, FILM COATED ORAL NIGHTLY
Status: DISCONTINUED | OUTPATIENT
Start: 2022-01-01 | End: 2022-01-01

## 2022-01-01 RX ORDER — LORAZEPAM 2 MG/ML
2 INJECTION INTRAMUSCULAR EVERY 4 HOURS PRN
Status: DISCONTINUED | OUTPATIENT
Start: 2022-01-01 | End: 2022-01-01 | Stop reason: HOSPADM

## 2022-01-01 RX ORDER — ACETAMINOPHEN 325 MG/1
650 TABLET ORAL EVERY 6 HOURS PRN
Status: DISCONTINUED | OUTPATIENT
Start: 2022-01-01 | End: 2022-01-01 | Stop reason: HOSPADM

## 2022-01-01 RX ORDER — DEXAMETHASONE SODIUM PHOSPHATE 4 MG/ML
10 INJECTION, SOLUTION INTRA-ARTICULAR; INTRALESIONAL; INTRAMUSCULAR; INTRAVENOUS; SOFT TISSUE EVERY 24 HOURS
Status: DISCONTINUED | OUTPATIENT
Start: 2022-01-01 | End: 2022-01-01

## 2022-01-01 RX ORDER — DONEPEZIL HYDROCHLORIDE 5 MG/1
TABLET, FILM COATED ORAL
Qty: 90 TABLET | Refills: 0 | Status: SHIPPED | OUTPATIENT
Start: 2022-01-01 | End: 2022-01-01

## 2022-01-01 RX ORDER — FUROSEMIDE 40 MG/1
TABLET ORAL
Qty: 90 TABLET | Refills: 3 | Status: ON HOLD | OUTPATIENT
Start: 2022-01-01 | End: 2022-01-01 | Stop reason: HOSPADM

## 2022-01-01 RX ORDER — CARVEDILOL 6.25 MG/1
6.25 TABLET ORAL 2 TIMES DAILY
Status: DISCONTINUED | OUTPATIENT
Start: 2022-01-01 | End: 2022-01-01 | Stop reason: HOSPADM

## 2022-01-01 RX ORDER — ATORVASTATIN CALCIUM 20 MG/1
20 TABLET, FILM COATED ORAL NIGHTLY
Status: DISCONTINUED | OUTPATIENT
Start: 2022-01-01 | End: 2022-01-01

## 2022-01-01 RX ORDER — ALLOPURINOL 100 MG/1
100 TABLET ORAL DAILY
Status: DISCONTINUED | OUTPATIENT
Start: 2022-01-01 | End: 2022-01-01

## 2022-01-01 RX ORDER — ASPIRIN 81 MG/1
81 TABLET ORAL DAILY
Status: DISCONTINUED | OUTPATIENT
Start: 2022-01-01 | End: 2022-01-01

## 2022-01-01 RX ORDER — METHYLPREDNISOLONE SODIUM SUCCINATE 40 MG/ML
40 INJECTION, POWDER, LYOPHILIZED, FOR SOLUTION INTRAMUSCULAR; INTRAVENOUS EVERY 12 HOURS
Status: DISCONTINUED | OUTPATIENT
Start: 2022-01-01 | End: 2022-01-01

## 2022-01-01 RX ORDER — ALLOPURINOL 100 MG/1
TABLET ORAL
Qty: 90 TABLET | Refills: 3 | Status: ON HOLD | OUTPATIENT
Start: 2022-01-01 | End: 2022-01-01 | Stop reason: HOSPADM

## 2022-01-01 RX ORDER — FUROSEMIDE 40 MG/1
40 TABLET ORAL DAILY
Status: DISCONTINUED | OUTPATIENT
Start: 2022-01-01 | End: 2022-01-01

## 2022-01-01 RX ORDER — LORAZEPAM 2 MG/ML
1 INJECTION INTRAMUSCULAR EVERY 6 HOURS PRN
Status: DISCONTINUED | OUTPATIENT
Start: 2022-01-01 | End: 2022-01-01

## 2022-01-01 RX ORDER — GUAIFENESIN/DEXTROMETHORPHAN 100-10MG/5
5 SYRUP ORAL EVERY 4 HOURS PRN
Status: DISCONTINUED | OUTPATIENT
Start: 2022-01-01 | End: 2022-01-01 | Stop reason: HOSPADM

## 2022-01-01 RX ORDER — PHYTONADIONE 5 MG/1
5 TABLET ORAL ONCE
Status: COMPLETED | OUTPATIENT
Start: 2022-01-01 | End: 2022-01-01

## 2022-01-01 RX ORDER — ALBUTEROL SULFATE 2.5 MG/3ML
2.5 SOLUTION RESPIRATORY (INHALATION)
Status: DISCONTINUED | OUTPATIENT
Start: 2022-01-01 | End: 2022-01-01 | Stop reason: HOSPADM

## 2022-01-01 RX ORDER — SACUBITRIL AND VALSARTAN 24; 26 MG/1; MG/1
TABLET, FILM COATED ORAL
Qty: 90 TABLET | Refills: 0 | Status: SHIPPED | OUTPATIENT
Start: 2022-01-01 | End: 2022-01-01 | Stop reason: SDUPTHER

## 2022-01-01 RX ORDER — LORAZEPAM 1 MG/1
1 TABLET ORAL NIGHTLY
Qty: 30 TABLET | Refills: 2 | Status: SHIPPED | OUTPATIENT
Start: 2022-01-01 | End: 2022-01-01

## 2022-01-01 RX ORDER — ONDANSETRON 2 MG/ML
4 INJECTION INTRAMUSCULAR; INTRAVENOUS EVERY 6 HOURS PRN
Status: DISCONTINUED | OUTPATIENT
Start: 2022-01-01 | End: 2022-01-01 | Stop reason: SDUPTHER

## 2022-01-01 RX ORDER — SACUBITRIL AND VALSARTAN 24; 26 MG/1; MG/1
TABLET, FILM COATED ORAL
Qty: 90 TABLET | Refills: 0 | Status: SHIPPED | OUTPATIENT
Start: 2022-01-01 | End: 2022-01-01

## 2022-01-01 RX ORDER — ONDANSETRON 2 MG/ML
4 INJECTION INTRAMUSCULAR; INTRAVENOUS EVERY 6 HOURS PRN
Status: DISCONTINUED | OUTPATIENT
Start: 2022-01-01 | End: 2022-01-01 | Stop reason: HOSPADM

## 2022-01-01 RX ORDER — DIGOXIN 250 MCG
250 TABLET ORAL DAILY
Qty: 90 TABLET | Refills: 3 | Status: ON HOLD | OUTPATIENT
Start: 2022-01-01 | End: 2022-01-01 | Stop reason: HOSPADM

## 2022-01-01 RX ORDER — MORPHINE SULFATE 2 MG/ML
2 INJECTION, SOLUTION INTRAMUSCULAR; INTRAVENOUS EVERY 4 HOURS PRN
Status: DISCONTINUED | OUTPATIENT
Start: 2022-01-01 | End: 2022-01-01 | Stop reason: HOSPADM

## 2022-01-01 RX ORDER — LORAZEPAM 1 MG/1
TABLET ORAL
Qty: 30 TABLET | Refills: 0 | Status: SHIPPED | OUTPATIENT
Start: 2022-01-01 | End: 2022-01-01

## 2022-01-01 RX ORDER — ONDANSETRON 4 MG/1
4 TABLET, ORALLY DISINTEGRATING ORAL EVERY 8 HOURS PRN
Status: DISCONTINUED | OUTPATIENT
Start: 2022-01-01 | End: 2022-01-01 | Stop reason: HOSPADM

## 2022-01-01 RX ORDER — SACUBITRIL AND VALSARTAN 24; 26 MG/1; MG/1
TABLET, FILM COATED ORAL
Qty: 90 TABLET | Refills: 3 | Status: ON HOLD | OUTPATIENT
Start: 2022-01-01 | End: 2022-01-01 | Stop reason: HOSPADM

## 2022-01-01 RX ORDER — DONEPEZIL HYDROCHLORIDE 5 MG/1
TABLET, FILM COATED ORAL
Qty: 90 TABLET | Refills: 3 | Status: SHIPPED | OUTPATIENT
Start: 2022-01-01 | End: 2022-01-01 | Stop reason: SDUPTHER

## 2022-01-01 RX ORDER — LANOLIN ALCOHOL/MO/W.PET/CERES
2.5 CREAM (GRAM) TOPICAL NIGHTLY
Status: DISCONTINUED | OUTPATIENT
Start: 2022-01-01 | End: 2022-01-01

## 2022-01-01 RX ORDER — LORAZEPAM 1 MG/1
TABLET ORAL
Qty: 30 TABLET | Refills: 0 | OUTPATIENT
Start: 2022-01-01

## 2022-01-01 RX ORDER — ONDANSETRON 4 MG/1
4 TABLET, ORALLY DISINTEGRATING ORAL EVERY 8 HOURS PRN
Status: DISCONTINUED | OUTPATIENT
Start: 2022-01-01 | End: 2022-01-01 | Stop reason: SDUPTHER

## 2022-01-01 RX ORDER — DONEPEZIL HYDROCHLORIDE 5 MG/1
TABLET, FILM COATED ORAL
Qty: 90 TABLET | Refills: 0 | OUTPATIENT
Start: 2022-01-01

## 2022-01-01 RX ORDER — LORAZEPAM 2 MG/ML
1 INJECTION INTRAMUSCULAR EVERY 4 HOURS PRN
Status: DISCONTINUED | OUTPATIENT
Start: 2022-01-01 | End: 2022-01-01

## 2022-01-01 RX ORDER — LORAZEPAM 1 MG/1
TABLET ORAL
Qty: 30 TABLET | Refills: 0 | Status: SHIPPED | OUTPATIENT
Start: 2022-01-01 | End: 2022-01-01 | Stop reason: SDUPTHER

## 2022-01-01 RX ORDER — ATORVASTATIN CALCIUM 20 MG/1
TABLET, FILM COATED ORAL
Qty: 90 TABLET | Refills: 3 | Status: ON HOLD | OUTPATIENT
Start: 2022-01-01 | End: 2022-01-01 | Stop reason: HOSPADM

## 2022-01-01 RX ORDER — MULTIVITAMIN WITH IRON
1 TABLET ORAL 2 TIMES DAILY
Status: DISCONTINUED | OUTPATIENT
Start: 2022-01-01 | End: 2022-01-01

## 2022-01-01 RX ORDER — ACETAMINOPHEN 500 MG
1000 TABLET ORAL ONCE
Status: COMPLETED | OUTPATIENT
Start: 2022-01-01 | End: 2022-01-01

## 2022-01-01 RX ORDER — BUMETANIDE 0.25 MG/ML
0.5 INJECTION, SOLUTION INTRAMUSCULAR; INTRAVENOUS ONCE
Status: COMPLETED | OUTPATIENT
Start: 2022-01-01 | End: 2022-01-01

## 2022-01-01 RX ORDER — DIGOXIN 250 MCG
250 TABLET ORAL DAILY
Status: DISCONTINUED | OUTPATIENT
Start: 2022-01-01 | End: 2022-01-01

## 2022-01-01 RX ORDER — DEXAMETHASONE SODIUM PHOSPHATE 4 MG/ML
6 INJECTION, SOLUTION INTRA-ARTICULAR; INTRALESIONAL; INTRAMUSCULAR; INTRAVENOUS; SOFT TISSUE EVERY 24 HOURS
Status: DISCONTINUED | OUTPATIENT
Start: 2022-01-01 | End: 2022-01-01 | Stop reason: HOSPADM

## 2022-01-01 RX ORDER — ALBUTEROL SULFATE 2.5 MG/3ML
2.5 SOLUTION RESPIRATORY (INHALATION)
Status: DISCONTINUED | OUTPATIENT
Start: 2022-01-01 | End: 2022-01-01

## 2022-01-01 RX ORDER — LORAZEPAM 1 MG/1
TABLET ORAL
Status: ON HOLD | COMMUNITY
Start: 2022-01-01 | End: 2022-01-01 | Stop reason: HOSPADM

## 2022-01-01 RX ORDER — POLYETHYLENE GLYCOL 3350 17 G/17G
17 POWDER, FOR SOLUTION ORAL DAILY PRN
Status: DISCONTINUED | OUTPATIENT
Start: 2022-01-01 | End: 2022-01-01

## 2022-01-01 RX ORDER — SODIUM CHLORIDE, SODIUM LACTATE, POTASSIUM CHLORIDE, AND CALCIUM CHLORIDE .6; .31; .03; .02 G/100ML; G/100ML; G/100ML; G/100ML
500 INJECTION, SOLUTION INTRAVENOUS ONCE
Status: COMPLETED | OUTPATIENT
Start: 2022-01-01 | End: 2022-01-01

## 2022-01-01 RX ORDER — NOREPINEPHRINE BIT/0.9 % NACL 16MG/250ML
2-100 INFUSION BOTTLE (ML) INTRAVENOUS CONTINUOUS
Status: DISCONTINUED | OUTPATIENT
Start: 2022-01-01 | End: 2022-01-01

## 2022-01-01 RX ORDER — OXYCODONE HCL 20 MG/ML
5 CONCENTRATE, ORAL ORAL EVERY 4 HOURS PRN
Status: DISCONTINUED | OUTPATIENT
Start: 2022-01-01 | End: 2022-01-01

## 2022-01-01 RX ORDER — LORAZEPAM 1 MG/1
1 TABLET ORAL NIGHTLY
Status: DISCONTINUED | OUTPATIENT
Start: 2022-01-01 | End: 2022-01-01

## 2022-01-01 RX ADMIN — DONEPEZIL HYDROCHLORIDE 5 MG: 10 TABLET, FILM COATED ORAL at 22:31

## 2022-01-01 RX ADMIN — ASPIRIN 81 MG: 81 TABLET, COATED ORAL at 09:51

## 2022-01-01 RX ADMIN — Medication 1 TABLET: at 20:20

## 2022-01-01 RX ADMIN — Medication 1 TABLET: at 13:01

## 2022-01-01 RX ADMIN — DOXYCYCLINE 100 MG: 100 INJECTION, POWDER, LYOPHILIZED, FOR SOLUTION INTRAVENOUS at 09:03

## 2022-01-01 RX ADMIN — Medication 1 TABLET: at 19:56

## 2022-01-01 RX ADMIN — MORPHINE SULFATE 2 MG: 2 INJECTION, SOLUTION INTRAMUSCULAR; INTRAVENOUS at 12:38

## 2022-01-01 RX ADMIN — SODIUM CHLORIDE, POTASSIUM CHLORIDE, SODIUM LACTATE AND CALCIUM CHLORIDE 500 ML: 600; 310; 30; 20 INJECTION, SOLUTION INTRAVENOUS at 14:51

## 2022-01-01 RX ADMIN — Medication 1 TABLET: at 20:21

## 2022-01-01 RX ADMIN — ASPIRIN 81 MG: 81 TABLET, COATED ORAL at 09:05

## 2022-01-01 RX ADMIN — LORAZEPAM 2 MG: 2 INJECTION INTRAMUSCULAR; INTRAVENOUS at 06:54

## 2022-01-01 RX ADMIN — MORPHINE SULFATE 2 MG: 2 INJECTION, SOLUTION INTRAMUSCULAR; INTRAVENOUS at 00:11

## 2022-01-01 RX ADMIN — CEFEPIME 1000 MG: 1 INJECTION, POWDER, FOR SOLUTION INTRAMUSCULAR; INTRAVENOUS at 01:11

## 2022-01-01 RX ADMIN — DOXYCYCLINE 100 MG: 100 INJECTION, POWDER, LYOPHILIZED, FOR SOLUTION INTRAVENOUS at 14:48

## 2022-01-01 RX ADMIN — CEFEPIME 2000 MG: 2 INJECTION, POWDER, FOR SOLUTION INTRAVENOUS at 17:08

## 2022-01-01 RX ADMIN — ATORVASTATIN CALCIUM 20 MG: 20 TABLET, FILM COATED ORAL at 20:22

## 2022-01-01 RX ADMIN — SACUBITRIL AND VALSARTAN 1 TABLET: 24; 26 TABLET, FILM COATED ORAL at 22:31

## 2022-01-01 RX ADMIN — Medication 5 MCG/MIN: at 15:21

## 2022-01-01 RX ADMIN — FUROSEMIDE 40 MG: 40 TABLET ORAL at 07:48

## 2022-01-01 RX ADMIN — ATORVASTATIN CALCIUM 20 MG: 20 TABLET, FILM COATED ORAL at 22:31

## 2022-01-01 RX ADMIN — CEFEPIME 1000 MG: 1 INJECTION, POWDER, FOR SOLUTION INTRAMUSCULAR; INTRAVENOUS at 09:01

## 2022-01-01 RX ADMIN — ASPIRIN 81 MG: 81 TABLET, COATED ORAL at 08:05

## 2022-01-01 RX ADMIN — DONEPEZIL HYDROCHLORIDE 5 MG: 10 TABLET, FILM COATED ORAL at 20:20

## 2022-01-01 RX ADMIN — Medication 15 MCG/MIN: at 07:46

## 2022-01-01 RX ADMIN — DIGOXIN 250 MCG: 250 TABLET ORAL at 08:33

## 2022-01-01 RX ADMIN — MORPHINE SULFATE 2 MG: 2 INJECTION, SOLUTION INTRAMUSCULAR; INTRAVENOUS at 12:22

## 2022-01-01 RX ADMIN — DOXYCYCLINE 100 MG: 100 INJECTION, POWDER, LYOPHILIZED, FOR SOLUTION INTRAVENOUS at 20:53

## 2022-01-01 RX ADMIN — DIGOXIN 250 MCG: 250 TABLET ORAL at 07:47

## 2022-01-01 RX ADMIN — Medication 3 MG: at 20:20

## 2022-01-01 RX ADMIN — ACETAMINOPHEN 650 MG: 325 TABLET ORAL at 21:31

## 2022-01-01 RX ADMIN — ALLOPURINOL 100 MG: 100 TABLET ORAL at 07:47

## 2022-01-01 RX ADMIN — LORAZEPAM 2 MG: 2 INJECTION INTRAMUSCULAR; INTRAVENOUS at 14:28

## 2022-01-01 RX ADMIN — DEXAMETHASONE SODIUM PHOSPHATE 10 MG: 4 INJECTION, SOLUTION INTRA-ARTICULAR; INTRALESIONAL; INTRAMUSCULAR; INTRAVENOUS; SOFT TISSUE at 17:30

## 2022-01-01 RX ADMIN — METHYLPREDNISOLONE SODIUM SUCCINATE 40 MG: 40 INJECTION, POWDER, FOR SOLUTION INTRAMUSCULAR; INTRAVENOUS at 19:56

## 2022-01-01 RX ADMIN — VASOPRESSIN 0.04 UNITS/MIN: 20 INJECTION PARENTERAL at 23:32

## 2022-01-01 RX ADMIN — Medication 1 TABLET: at 09:51

## 2022-01-01 RX ADMIN — CEFEPIME 1000 MG: 1 INJECTION, POWDER, FOR SOLUTION INTRAMUSCULAR; INTRAVENOUS at 17:14

## 2022-01-01 RX ADMIN — VANCOMYCIN HYDROCHLORIDE 2000 MG: 1 INJECTION, POWDER, LYOPHILIZED, FOR SOLUTION INTRAVENOUS at 17:34

## 2022-01-01 RX ADMIN — DOXYCYCLINE 100 MG: 100 INJECTION, POWDER, LYOPHILIZED, FOR SOLUTION INTRAVENOUS at 10:02

## 2022-01-01 RX ADMIN — DOXYCYCLINE 100 MG: 100 INJECTION, POWDER, LYOPHILIZED, FOR SOLUTION INTRAVENOUS at 22:25

## 2022-01-01 RX ADMIN — CEFEPIME 1000 MG: 1 INJECTION, POWDER, FOR SOLUTION INTRAMUSCULAR; INTRAVENOUS at 00:42

## 2022-01-01 RX ADMIN — ACETAMINOPHEN 1000 MG: 500 TABLET ORAL at 14:51

## 2022-01-01 RX ADMIN — VASOPRESSIN 0.04 UNITS/MIN: 20 INJECTION PARENTERAL at 15:50

## 2022-01-01 RX ADMIN — DONEPEZIL HYDROCHLORIDE 5 MG: 10 TABLET, FILM COATED ORAL at 19:57

## 2022-01-01 RX ADMIN — LORAZEPAM 1 MG: 2 INJECTION INTRAMUSCULAR; INTRAVENOUS at 21:58

## 2022-01-01 RX ADMIN — CEFEPIME 1000 MG: 1 INJECTION, POWDER, FOR SOLUTION INTRAMUSCULAR; INTRAVENOUS at 15:53

## 2022-01-01 RX ADMIN — MORPHINE SULFATE 2 MG: 2 INJECTION, SOLUTION INTRAMUSCULAR; INTRAVENOUS at 19:34

## 2022-01-01 RX ADMIN — LORAZEPAM 2 MG: 2 INJECTION INTRAMUSCULAR; INTRAVENOUS at 19:34

## 2022-01-01 RX ADMIN — Medication 1 TABLET: at 22:31

## 2022-01-01 RX ADMIN — LORAZEPAM 1 MG: 2 INJECTION INTRAMUSCULAR; INTRAVENOUS at 11:00

## 2022-01-01 RX ADMIN — Medication 1 TABLET: at 07:47

## 2022-01-01 RX ADMIN — PHYTONADIONE 5 MG: 5 TABLET ORAL at 17:18

## 2022-01-01 RX ADMIN — CEFEPIME 1000 MG: 1 INJECTION, POWDER, FOR SOLUTION INTRAMUSCULAR; INTRAVENOUS at 09:58

## 2022-01-01 RX ADMIN — VASOPRESSIN 0.04 UNITS/MIN: 20 INJECTION PARENTERAL at 00:12

## 2022-01-01 RX ADMIN — LORAZEPAM 2 MG: 2 INJECTION INTRAMUSCULAR; INTRAVENOUS at 00:11

## 2022-01-01 RX ADMIN — DIGOXIN 250 MCG: 250 TABLET ORAL at 09:51

## 2022-01-01 RX ADMIN — Medication 3 MG: at 19:56

## 2022-01-01 RX ADMIN — CEFEPIME 1000 MG: 1 INJECTION, POWDER, FOR SOLUTION INTRAMUSCULAR; INTRAVENOUS at 12:28

## 2022-01-01 RX ADMIN — VASOPRESSIN 0.04 UNITS/MIN: 20 INJECTION PARENTERAL at 08:50

## 2022-01-01 RX ADMIN — METHYLPREDNISOLONE SODIUM SUCCINATE 40 MG: 40 INJECTION, POWDER, FOR SOLUTION INTRAMUSCULAR; INTRAVENOUS at 09:05

## 2022-01-01 RX ADMIN — METHYLPREDNISOLONE SODIUM SUCCINATE 40 MG: 40 INJECTION, POWDER, FOR SOLUTION INTRAMUSCULAR; INTRAVENOUS at 08:05

## 2022-01-01 RX ADMIN — LORAZEPAM 2 MG: 2 INJECTION INTRAMUSCULAR; INTRAVENOUS at 12:22

## 2022-01-01 RX ADMIN — CEFEPIME 1000 MG: 1 INJECTION, POWDER, FOR SOLUTION INTRAMUSCULAR; INTRAVENOUS at 08:49

## 2022-01-01 RX ADMIN — ALLOPURINOL 100 MG: 100 TABLET ORAL at 08:34

## 2022-01-01 RX ADMIN — BARIUM SULFATE 30 ML: 0.81 POWDER, FOR SUSPENSION ORAL at 11:53

## 2022-01-01 RX ADMIN — LORAZEPAM 1 MG: 1 TABLET ORAL at 21:32

## 2022-01-01 RX ADMIN — METHYLPREDNISOLONE SODIUM SUCCINATE 40 MG: 40 INJECTION, POWDER, FOR SOLUTION INTRAMUSCULAR; INTRAVENOUS at 22:38

## 2022-01-01 RX ADMIN — LORAZEPAM 1 MG: 1 TABLET ORAL at 19:56

## 2022-01-01 RX ADMIN — LORAZEPAM 1 MG: 1 TABLET ORAL at 20:20

## 2022-01-01 RX ADMIN — ATORVASTATIN CALCIUM 20 MG: 20 TABLET, FILM COATED ORAL at 19:56

## 2022-01-01 RX ADMIN — CEFEPIME 1000 MG: 1 INJECTION, POWDER, FOR SOLUTION INTRAMUSCULAR; INTRAVENOUS at 01:38

## 2022-01-01 RX ADMIN — CEFEPIME 1000 MG: 1 INJECTION, POWDER, FOR SOLUTION INTRAMUSCULAR; INTRAVENOUS at 17:35

## 2022-01-01 RX ADMIN — Medication 1 TABLET: at 08:33

## 2022-01-01 RX ADMIN — BUMETANIDE 0.5 MG: 0.25 INJECTION, SOLUTION INTRAMUSCULAR; INTRAVENOUS at 22:38

## 2022-01-01 RX ADMIN — CEFEPIME 1000 MG: 1 INJECTION, POWDER, FOR SOLUTION INTRAMUSCULAR; INTRAVENOUS at 09:10

## 2022-01-01 RX ADMIN — VASOPRESSIN 0.04 UNITS/MIN: 20 INJECTION PARENTERAL at 07:44

## 2022-01-01 RX ADMIN — MORPHINE SULFATE 2 MG: 2 INJECTION, SOLUTION INTRAMUSCULAR; INTRAVENOUS at 06:57

## 2022-01-01 RX ADMIN — FUROSEMIDE 40 MG: 40 TABLET ORAL at 08:33

## 2022-01-01 RX ADMIN — DOXYCYCLINE 100 MG: 100 INJECTION, POWDER, LYOPHILIZED, FOR SOLUTION INTRAVENOUS at 13:19

## 2022-01-01 RX ADMIN — Medication 3 MG: at 21:32

## 2022-01-01 RX ADMIN — DOXYCYCLINE 100 MG: 100 INJECTION, POWDER, LYOPHILIZED, FOR SOLUTION INTRAVENOUS at 22:29

## 2022-01-01 RX ADMIN — FUROSEMIDE 40 MG: 40 TABLET ORAL at 09:51

## 2022-01-01 RX ADMIN — MORPHINE SULFATE 2 MG: 2 INJECTION, SOLUTION INTRAMUSCULAR; INTRAVENOUS at 03:56

## 2022-01-01 RX ADMIN — CEFEPIME 1000 MG: 1 INJECTION, POWDER, FOR SOLUTION INTRAMUSCULAR; INTRAVENOUS at 17:18

## 2022-01-01 RX ADMIN — CEFEPIME 1000 MG: 1 INJECTION, POWDER, FOR SOLUTION INTRAMUSCULAR; INTRAVENOUS at 02:19

## 2022-01-01 ASSESSMENT — PAIN SCALES - GENERAL
PAINLEVEL_OUTOF10: 0
PAINLEVEL_OUTOF10: 0
PAINLEVEL_OUTOF10: 5
PAINLEVEL_OUTOF10: 1
PAINLEVEL_OUTOF10: 3
PAINLEVEL_OUTOF10: 6
PAINLEVEL_OUTOF10: 1
PAINLEVEL_OUTOF10: 0
PAINLEVEL_OUTOF10: 2
PAINLEVEL_OUTOF10: 7
PAINLEVEL_OUTOF10: 7
PAINLEVEL_OUTOF10: 0

## 2022-01-01 ASSESSMENT — PAIN DESCRIPTION - ORIENTATION
ORIENTATION: MID
ORIENTATION: RIGHT
ORIENTATION: MID

## 2022-01-01 ASSESSMENT — PAIN DESCRIPTION - DESCRIPTORS
DESCRIPTORS: DISCOMFORT
DESCRIPTORS: DISCOMFORT
DESCRIPTORS: SHARP
DESCRIPTORS: DISCOMFORT
DESCRIPTORS: DISCOMFORT

## 2022-01-01 ASSESSMENT — PAIN DESCRIPTION - FREQUENCY
FREQUENCY: CONTINUOUS

## 2022-01-01 ASSESSMENT — ENCOUNTER SYMPTOMS
SHORTNESS OF BREATH: 0
SHORTNESS OF BREATH: 1
ABDOMINAL PAIN: 0
ABDOMINAL DISTENTION: 0
COLOR CHANGE: 1
CHEST TIGHTNESS: 0
RHINORRHEA: 0

## 2022-01-01 ASSESSMENT — PAIN SCALES - WONG BAKER
WONGBAKER_NUMERICALRESPONSE: 4
WONGBAKER_NUMERICALRESPONSE: 8

## 2022-01-01 ASSESSMENT — PAIN DESCRIPTION - ONSET
ONSET: ON-GOING

## 2022-01-01 ASSESSMENT — PAIN DESCRIPTION - PAIN TYPE
TYPE: ACUTE PAIN

## 2022-01-01 ASSESSMENT — PAIN - FUNCTIONAL ASSESSMENT
PAIN_FUNCTIONAL_ASSESSMENT: NONE - DENIES PAIN
PAIN_FUNCTIONAL_ASSESSMENT: NONE - DENIES PAIN
PAIN_FUNCTIONAL_ASSESSMENT: 0-10
PAIN_FUNCTIONAL_ASSESSMENT: NONE - DENIES PAIN
PAIN_FUNCTIONAL_ASSESSMENT: NONE - DENIES PAIN

## 2022-01-01 ASSESSMENT — PAIN DESCRIPTION - LOCATION
LOCATION: GROIN
LOCATION: HIP

## 2022-01-06 NOTE — PROGRESS NOTES
Medication Management [address]  [phone] (phone)  [phone] (fax)    Mr. Radha Sharp is a 80 y.o.  male with history of Afib who presents today for anticoagulation monitoring and adjustment. Patient verifies current dosing regimen and tablet strength. No missed or extra doses. Patient denies s/s bleeding/bruising/swelling/SOB/chest pain  No blood in urine or stool. No dietary changes. No changes in medication/OTC agents/Herbals. No change in alcohol use or tobacco use. No change in activity level. Patient denies headaches/dizziness/lightheadedness/falls. No vomiting/diarrhea or acute illness. No Procedures scheduled in the future at this time. Assessment:   Lab Results   Component Value Date    INR 2.40 (H) 01/06/2022    INR 1.80 (H) 12/27/2021    INR 2.90 (H) 12/20/2021     INR therapeutic   Recent Labs     01/06/22  0934   INR 2.40*           Plan:  Continue Coumadin 7.5mg MF and 5mg TWThSaS. Recheck INR in 3 week(s). Patient reminded to call the Anticoagulation Clinic with any signs or symptoms of bleeding or with any medication changes. Patient given instructions utilizing the teach back method. After visit summary printed and reviewed with patient. Discharged ambulatory in no apparent distress.     For Pharmacy Admin Tracking Only     Time Spent (min): 20

## 2022-01-27 NOTE — PROGRESS NOTES
Medication Management [address]  [phone] (phone)  [phone] (fax)    Mr. Niharika Ansari is a 80 y.o.  male with history of persistent atrial fib. , per Dr. Joel Yeboah referral, who presents today for Warfarin monitoring and adjustment (3 week visit). Patient verifies tablet strength. Son verified dose. No missed or extra doses. Patient denies bleeding/swelling/chest pain. Has usual slight intermittent SOB/easy bruising. No blood in urine or stool. No dietary changes. No changes in medication/OTC agents/herbals. No change in alcohol use or tobacco use. No change in activity level. Patient denies headaches/dizziness/lightheadedness/falls. No vomiting/diarrhea or acute illness. No procedures scheduled in the future at this time. Assessment:   Lab Results   Component Value Date    INR 2.50 (H) 01/27/2022    INR 2.40 (H) 01/06/2022    INR 1.80 (H) 12/27/2021     INR therapeutic - goal 2-3. Recent Labs     01/27/22  0905   INR 2.50*       Plan:  POCT INR ordered/performed/result reviewed. Continue PO Coumadin 7.5 mg MF, 5 mg TWThSS. Recheck INR in 4 week(s). Patient reminded to call the Anticoagulation Clinic with any signs or symptoms of bleeding or with any medication changes. Patient given instructions utilizing the teach back method. After visit summary printed and reviewed with son. Discharged ambulatory in no apparent distress, wearing mask, with son.

## 2022-02-15 NOTE — PROGRESS NOTES
DR Kermit Salter PT/ KNOWN AFIB/  COUMADIN  AFIB BURDEN 0%    [employer]TR[employer] OPTIVOL REMOTE   BATTERY 7.2 YRS REMAINING  SINGLE ICD  4 HIGH VENT RATE EPISODES   ALL LOOK LIKE NS VT     OPTIVOL WNL

## 2022-02-24 NOTE — PROGRESS NOTES
Medication Management [address]  [phone] (phone)  [phone] (fax)    Mr. Ashlyn Skinner is a 80 y.o.  male with history of persistent atrial fib. , per Dr. Aguilera Seek referral, who presents today for Warfarin monitoring and adjustment (4 week visit). Son verifies current dosing regimen and tablet strength. Hasn't taken yet today. No missed or extra doses. Patient denies bleeding/bruising/swelling/SOB/chest pain. No blood in urine or stool. No dietary changes. Denies having green tea/V8 juice/Boost/Ensure/Glucerna. No changes in medication/OTC agents/herbals. No change in alcohol use or tobacco use. No change in activity level. Patient denies headaches/dizziness/lightheadedness/falls. No vomiting/diarrhea or acute illness. No procedures scheduled in the future at this time. [place]. Assessment:   Lab Results   Component Value Date    INR 1.70 (H) 02/24/2022    INR 2.50 (H) 01/27/2022    INR 2.40 (H) 01/06/2022     INR subtherapeutic - goal 2-3. Recent Labs     02/24/22  0917   INR 1.70*       Plan:  POCT INR ordered/performed/result reviewed. 7.5 mg today, Th (per policy), then continue PO Coumadin 7.5 mg MF, 5 mg TWThSS. Recheck INR in 3 week(s), per policy. Patient reminded to call the Anticoagulation Clinic with any signs or symptoms of bleeding or with any medication changes. Patient given instructions utilizing the teach back method. After visit summary printed and reviewed with patient/son. Discharged ambulatory in no apparent distress, wearing mask, with son.

## 2022-03-17 NOTE — PROGRESS NOTES
Medication Management [address]   [phone] (phone)  [phone] (fax)    Mr. Julia James is a 80 y.o.  male with history of persistent atrial fib. , per Dr. Feliciano Kessler referral, who presents today for Warfarin monitoring and adjustment (3 week visit). Son verifies current dosing regimen and tablet strength. No missed or extra doses, except for bolus ordered last visit. Patient denies bleeding/bruising/swelling/SOB/chest pain. No blood in urine or stool. No dietary changes. No changes in medication/OTC agents/herbals. No change in alcohol use or tobacco use. No change in activity level. Patient denies headaches/dizziness/lightheadedness/falls. No vomiting or acute illness. Takes nothing for usual intermittent diarrhea. No procedures scheduled in the future at this time. Assessment:   Lab Results   Component Value Date    INR 2.00 (H) 03/17/2022    INR 1.70 (H) 02/24/2022    INR 2.50 (H) 01/27/2022     INR therapeutic - goal 2-3. Recent Labs     03/17/22  0910   INR 2.00*       Plan:  POCT INR ordered/performed/result reviewed. Continue PO Coumadin 7.5 mg MF, 5 mg TWThSS. Recheck INR in 4 week(s). Patient reminded to call the Anticoagulation Clinic with any signs or symptoms of bleeding or with any medication changes. Patient given instructions utilizing the teach back method. After visit summary printed and reviewed with patient. Discharged ambulatory in no apparent distress, wearing mask, with son.

## 2022-03-22 NOTE — PROGRESS NOTES
Chief Complaint   Patient presents with    3 Month Follow-Up       Patient presents for  evaluation of dizziness. I last saw the patient 3 weeks   ago after hospitalization for hemoptysis and community-acquired pneumonia. Patient is followed on a regular basis by Dr. Villarreal Given  Patient has not had any admissions to the hospital or change in medications since the last visit here. Pertinent past history reviewed and summarized below  He is here with his son  He has an ICD with EF 25%   He has permanent atrial fibrillation, on warfarin. He had CABG, MR repair 2008  I had the lower Entresto dose as he was hypotensive and dizzy  His son is with him today  He said they are considering assisted living    He was admitted in October 2017 with chest pain. Dr. Maico Fields did a cardiac catheterization which did show some CAD, not really amenable to intervention. Echo showed EF up to 50%. He is managed medically. He has done well  e is doing well. Denies weight gain. No orthopnea or PND.   He says he no longer sees Dr. Maico Fields  He was seeing Dr. Jaspreet Arnold  He had battery replacement for his ICD on 4/22 with Dr. Jaspreet Arnold    He is here with his son  Patient Active Problem List   Diagnosis    CAD (coronary artery disease)    Aortic stenosis    SSS (sick sinus syndrome) ([address]    Hyperlipidemia    Kaposi's sarcoma ([address]    S/P CABG (coronary artery bypass graft)    Medtronic single ICD    GERD (gastroesophageal reflux disease)    Hyperuricemia    HTN (hypertension)    S/P MVR (mitral valve repair), Physio ring, 2008    Persistent atrial fibrillation ([address]    Pacemaker    Dysphagia    Hoarseness    Laryngeal disorder    S/P AVR (aortic valve replacement)    Anticoagulated on Coumadin    Mild cognitive impairment    Chronic systolic CHF (congestive heart failure) ([address]    ICD (implantable cardioverter-defibrillator) battery depletion    Long term (current) use of anticoagulants    Encounter for therapeutic drug monitoring    Hemoptysis    Dizziness       Current Outpatient Medications   Medication Sig Dispense Refill    sacubitril-valsartan (ENTRESTO) 24-26 MG per tablet TAKE 1 TABLET BY MOUTH TWO TIMES A DAY 90 tablet 0    LORazepam (ATIVAN) 1 MG tablet Take 1 tablet by mouth nightly for 30 days. 30 tablet 2    aspirin 81 MG EC tablet Take 1 tablet by mouth nightly Indications: Anticoagulant Therapy With food. 30 tablet 3    warfarin (COUMADIN) 5 MG tablet Take as directed by [facility]. 135 tablets = 90 days 135 tablet 3    donepezil (ARICEPT) 5 MG tablet TAKE 1 TABLET BY MOUTH EVERY NIGHT 90 tablet 5    atorvastatin (LIPITOR) 20 MG tablet TAKE 1 TABLET BY MOUTH ONE TIME A DAY AT NIGHT 90 tablet 2    allopurinol (ZYLOPRIM) 100 MG tablet TAKE 1 TABLET BY MOUTH EVERY MORNING 90 tablet 3    digoxin (DIGOX) 250 MCG tablet Take 1 tablet by mouth daily 90 tablet 3    carvedilol (COREG) 6.25 MG tablet Take 1 tablet by mouth 2 times daily 180 tablet 3    Melatonin 2.5 MG CHEW Take 1 tablet by mouth nightly      loperamide (IMODIUM) 2 MG capsule Take 2 mg by mouth as needed Indications: Diarrhea       Multiple Vitamins-Minerals (THERAPEUTIC MULTIVITAMIN-MINERALS) tablet Take 1 tablet by mouth 2 times daily Indications: Nutritional Support Noon and Supper      acetaminophen (TYLENOL) 325 MG tablet Take 650 mg by mouth every 6 hours as needed for Pain or Fever Indications: Pain Don't take more than 3,000 mg per day      Calcium Carbonate-Vitamin D (CALCIUM + D PO) Take 1 tablet by mouth 2 times daily Indications: Treatment to Prevent Vitamin Deficiency Noon and Supper      furosemide (LASIX) 40 MG tablet TAKE 1 TABLET BY MOUTH DAILY 90 tablet 3     No current facility-administered medications for this visit. No Known Allergies    Review of Systems - General ROS: negative  Psychological ROS: negative  Hematological and Lymphatic ROS: No history of blood clots or bleeding disorder.    Respiratory ROS:noo shortness of breath, or wheezing, occasionally he will cough up a small amount of blood  Cardiovascular ROS: he does have dyspnea on exertion, usually about one block  Gastrointestinal ROS: no abdominal pain, change in bowel habits, or black or bloody stools  Genito-Urinary ROS: no dysuria, hesitancy, nocturia present  Musculoskeletal ROS:  negative  Neurological ROS: no TIA or stroke symptoms, patient says he does have some memory issues  Dermatological ROS: negative    Blood pressure 128/62, pulse 77, temperature 97.8 °F (36.6 °C), height 5' 10\" (1.778 m), weight 173 lb (78.5 kg). Body mass index is 24.82 kg/m². Physical Examination: General appearance - alert, well appearing, and in no distress  Mental status - alert, oriented to person, place, and time  Neck - Neck is supple, no JVD or carotid bruits. No thyromegaly or adenopathy. Chest - clear to auscultation, no wheezes, rales or rhonchi, symmetric air entry  Heart - normal rate, regular rhythm, normal S1, S2, loud 3/6 systolic murmur over the entire precordium, no rubs, clicks or gallops  Abdomen - soft, nontender, nondistended, no masses or organomegaly  Neurological - alert, oriented, normal speech, no focal findings or movement disorder noted  Extremities - peripheral pulses normal, no pedal edema, no clubbing or cyanosis  Skin - normal coloration and turgor, no rashes, no suspicious skin lesions noted    Orders Placed This Encounter   Procedures    Hemoglobin     Standing Status:   Future     Standing Expiration Date:   3/22/2023       Outpatient Encounter Medications as of 3/22/2022   Medication Sig Dispense Refill    sacubitril-valsartan (ENTRESTO) 24-26 MG per tablet TAKE 1 TABLET BY MOUTH TWO TIMES A DAY 90 tablet 0    LORazepam (ATIVAN) 1 MG tablet Take 1 tablet by mouth nightly for 30 days. 30 tablet 2    aspirin 81 MG EC tablet Take 1 tablet by mouth nightly Indications: Anticoagulant Therapy With food.  30 tablet 3    warfarin (COUMADIN) 5 MG tablet Take as directed by [facility]. 135 tablets = 90 days 135 tablet 3    donepezil (ARICEPT) 5 MG tablet TAKE 1 TABLET BY MOUTH EVERY NIGHT 90 tablet 5    atorvastatin (LIPITOR) 20 MG tablet TAKE 1 TABLET BY MOUTH ONE TIME A DAY AT NIGHT 90 tablet 2    allopurinol (ZYLOPRIM) 100 MG tablet TAKE 1 TABLET BY MOUTH EVERY MORNING 90 tablet 3    digoxin (DIGOX) 250 MCG tablet Take 1 tablet by mouth daily 90 tablet 3    carvedilol (COREG) 6.25 MG tablet Take 1 tablet by mouth 2 times daily 180 tablet 3    Melatonin 2.5 MG CHEW Take 1 tablet by mouth nightly      loperamide (IMODIUM) 2 MG capsule Take 2 mg by mouth as needed Indications: Diarrhea       Multiple Vitamins-Minerals (THERAPEUTIC MULTIVITAMIN-MINERALS) tablet Take 1 tablet by mouth 2 times daily Indications: Nutritional Support Noon and Supper      acetaminophen (TYLENOL) 325 MG tablet Take 650 mg by mouth every 6 hours as needed for Pain or Fever Indications: Pain Don't take more than 3,000 mg per day      Calcium Carbonate-Vitamin D (CALCIUM + D PO) Take 1 tablet by mouth 2 times daily Indications: Treatment to Prevent Vitamin Deficiency Noon and Supper      [DISCONTINUED] LORazepam (ATIVAN) 1 MG tablet Take by mouth nightly.  furosemide (LASIX) 40 MG tablet TAKE 1 TABLET BY MOUTH DAILY 90 tablet 3    [DISCONTINUED] ENTRESTO 24-26 MG per tablet TAKE 1 TABLET BY MOUTH TWO TIMES A DAY  90 tablet 0     No facility-administered encounter medications on file as of 3/22/2022. DIAGNOSES   Diagnosis Orders   1. Chronic systolic congestive heart failure ([facility]     2. Permanent atrial fibrillation (HCC)  sacubitril-valsartan (ENTRESTO) 24-26 MG per tablet    Hemoglobin   3. Anxiety  LORazepam (ATIVAN) 1 MG tablet   4. Mild cognitive impairment     5. Kaposi's sarcoma ([facility]     6. Hyperlipidemia, unspecified hyperlipidemia type     7. S/P CABG (coronary artery bypass graft)     8.  ICD (implantable cardioverter-defibrillator) in place     9. Essential hypertension     10. S/P AVR (aortic valve replacement)     11.  Community acquired pneumonia, unspecified laterality       I think he does have some baseline dementia  He does not know the year  He cannot spell world backwards      I will refill l Ativan 1 mg at bedtime it helps him sleep   He does have permanent atrial fibrillation on Coumadin  He is having no bleeding  We will check a hemoglobin  INR on 3/17 was 2.00  I discussed the situation at length with patient and family and answered all their questions    Follow-up 4 months

## 2022-03-22 NOTE — PROGRESS NOTES
DR Kermit Salter PT   MEDTRONIC SINGLE ICD REMOTE   BATTERY 7.1 YRS REMAINING    RV IMPEDENCE 361  RV 40  SVC 49  RV WAVES 6.8    VENT THRESHOLD 1.25 @ 0.4    VENT AMPLITUDE 2.5 @ 0.4    VVIR     V PACED 97.3%  OPTIVOL WNL    5 NS VT EPISODES

## 2022-04-26 NOTE — PROGRESS NOTES
[facility]     6.9 years on device   optivol WNL  Follows with Sy Henderson

## 2022-05-10 NOTE — PROGRESS NOTES
medtronic single icd   [place] pt     6.7 years on device   RV imped 361  Shock 39  SVC 49  95.6% RVP  optivol WNL  Dependent , no R waves   Threshold 1 @ 0.4

## 2022-05-10 NOTE — PROGRESS NOTES
Medication Management [address]  [phone] (phone)  [phone] (fax)    Mr. Librado Sharp is a 80 y.o.  male with history of Afib who presents today for anticoagulation monitoring and adjustment. Patient and son verify current dosing regimen and tablet strength. No missed or extra doses. Patient denies s/s bleeding/bruising/swelling/SOB/chest pain  No blood in urine or stool. No dietary changes. No changes in medication/OTC agents/Herbals. No change in alcohol use or tobacco use. No change in activity level. Patient denies headaches/dizziness/lightheadedness/falls. No vomiting/diarrhea or acute illness. No Procedures scheduled in the future at this time. Assessment:   Lab Results   Component Value Date    INR 2.20 (H) 05/10/2022    INR 2.40 (H) 04/12/2022    INR 2.00 (H) 03/17/2022     INR therapeutic   Recent Labs     05/10/22  1001   INR 2.20*         Plan:  Continue Coumadin 7.5 mg MF and 5 mg all other days. Recheck INR in 5 week(s) on 6/14. Patient reminded to call the Anticoagulation Clinic with any signs or symptoms of bleeding or with any medication changes. Patient given instructions utilizing the teach back method. After visit summary printed and reviewed with patient. Discharged ambulatory in no apparent distress.       For Pharmacy Admin Tracking Only     Total # of Interventions Recommended: 0   Time Spent (min): 5620 Read Rafael Chavze   5/10/2022, 10:15 AM

## 2022-06-14 NOTE — PROGRESS NOTES
Medication Management [address]   [phone] (phone)  [phone] (fax)    Mr. Aspen Aguirre is a 80 y.o.  male with history of Afib who presents today for anticoagulation monitoring and adjustment. Patient verifies current dosing regimen and tablet strength. No missed or extra doses. Patient denies s/s bleeding/bruising/swelling/SOB/chest pain  No blood in urine or stool. No dietary changes. No changes in medication/OTC agents/Herbals. No change in alcohol use or tobacco use. No change in activity level. Patient denies headaches/dizziness/lightheadedness/falls. No vomiting/diarrhea or acute illness. No Procedures scheduled in the future at this time. Assessment:   Lab Results   Component Value Date    INR 2.50 (H) 06/14/2022    INR 2.20 (H) 05/10/2022    INR 2.40 (H) 04/12/2022     INR therapeutic   Recent Labs     06/14/22  0906   INR 2.50*         Plan:  Continue Coumadin 7.5 mg MF and 5 mg all other days. Recheck INR in 5 week(s) on 7/19. Patient reminded to call the Anticoagulation Clinic with any signs or symptoms of bleeding or with any medication changes. Patient given instructions utilizing the teach back method. After visit summary printed and reviewed with patient. Discharged ambulatory in no apparent distress.       For Pharmacy Admin Tracking Only     Total # of Interventions Recommended: 0   Time Spent (min): 5620 Jose Cahvez PharmD   6/14/2022, 9:09 AM

## 2022-07-19 NOTE — PROGRESS NOTES
Medication Management [address]  [phone] (phone)  [phone] (fax)    Mr. Vikas Jennings is a 80 y.o.  male with history of persistent atrial fib. , per Dr. Nuha Grove referral, who presents today for Warfarin monitoring and adjustment (5 week visit). Son verifies current dosing regimen and tablet strength. Hasn't taken yet today. No missed or extra doses. Patient denies bleeding/bruising/swelling/SOB/chest pain. No blood in urine or stool. No dietary changes. No changes in medication/OTC agents/herbals. No change in alcohol use or tobacco use. No change in activity level. Patient denies dizziness/lightheadedness/falls. Sometimes takes Tylenol for usual headaches. No vomiting/diarrhea or acute illness. No procedures scheduled in the future at this time. Assessment:   Lab Results   Component Value Date    INR 3.10 (H) 07/19/2022    INR 2.50 (H) 06/14/2022    INR 2.20 (H) 05/10/2022     INR supratherapeutic - goal 2-3. Recent Labs     07/19/22  0920   INR 3.10*        Plan:  POCT INR ordered/performed/result reviewed. 2.5 mg today, T (per policy), then continue PO Coumadin 7.5 mg MF, 5 mg TWThSS. Recheck INR in 3 week(s), per policy. Patient reminded to call the Anticoagulation Clinic with any signs or symptoms of bleeding or with any medication changes. Patient given instructions utilizing the teach back method. After visit summary printed and reviewed with patient/son. Advised extra caution. Discharged ambulatory in no apparent distress, wearing mask, with son.

## 2022-07-19 NOTE — PROGRESS NOTES
Dr hyatt pt   Medtronic carelink optivol remote   Battery 6.4 yrs remaining    Known afib /coumadin     7 high vent rate episodes/ could possibly be afib with rvr verses ns vt   / longest :04 seconds     Optivol wnl

## 2022-07-23 NOTE — PROGRESS NOTES
depletion    Long term (current) use of anticoagulants    Encounter for therapeutic drug monitoring    Hemoptysis    Dizziness       Current Outpatient Medications   Medication Sig Dispense Refill    LORazepam (ATIVAN) 1 MG tablet TAKE 1 TABLET BY MOUTH EVERY DAY AT NIGHT 30 tablet 0    donepezil (ARICEPT) 5 MG tablet TAKE 1 TABLET BY MOUTH IN THE EVENING 90 tablet 3    ENTRESTO 24-26 MG per tablet TAKE 1 TABLET BY MOUTH TWO TIMES A DAY 90 tablet 0    aspirin 81 MG EC tablet Take 1 tablet by mouth nightly Indications: Anticoagulant Therapy With food. 30 tablet 3    warfarin (COUMADIN) 5 MG tablet Take as directed by [facility]. 135 tablets = 90 days 135 tablet 3    furosemide (LASIX) 40 MG tablet TAKE 1 TABLET BY MOUTH DAILY 90 tablet 3    atorvastatin (LIPITOR) 20 MG tablet TAKE 1 TABLET BY MOUTH ONE TIME A DAY AT NIGHT 90 tablet 2    allopurinol (ZYLOPRIM) 100 MG tablet TAKE 1 TABLET BY MOUTH EVERY MORNING 90 tablet 3    digoxin (DIGOX) 250 MCG tablet Take 1 tablet by mouth daily 90 tablet 3    carvedilol (COREG) 6.25 MG tablet Take 1 tablet by mouth 2 times daily 180 tablet 3    Melatonin 2.5 MG CHEW Take 1 tablet by mouth nightly      loperamide (IMODIUM) 2 MG capsule Take 2 mg by mouth as needed Indications: Diarrhea      Multiple Vitamins-Minerals (THERAPEUTIC MULTIVITAMIN-MINERALS) tablet Take 1 tablet by mouth 2 times daily Indications: Nutritional Support Noon and Supper      acetaminophen (TYLENOL) 325 MG tablet Take 650 mg by mouth every 6 hours as needed for Pain or Fever Indications: Pain Don't take more than 3,000 mg per day      Calcium Carbonate-Vitamin D (CALCIUM + D PO) Take 1 tablet by mouth 2 times daily Indications: Treatment to Prevent Vitamin Deficiency Noon and Supper       No current facility-administered medications for this visit.        No Known Allergies    Review of Systems - General weight loss   ROS: Psychological ROS: negative  Hematological and Lymphatic ROS: No history of blood clots or bleeding disorder. Respiratory ROS:noo shortness of breath, or wheezing, occasionally he will cough up a small amount of blood  Cardiovascular ROS: he does have dyspnea on exertion, usually about one block  Gastrointestinal ROS: no abdominal pain, change in bowel habits, or black or bloody stools  Genito-Urinary ROS: no dysuria, hesitancy, nocturia present  Musculoskeletal ROS:  negative  Neurological ROS: no TIA or stroke symptoms, patient says he does have some memory issues  Dermatological ROS: negative    Blood pressure (!) 140/66, pulse 92, temperature 97.9 °F (36.6 °C), temperature source Tympanic, resp. rate 16, height 5' 10\" (1.778 m), weight 164 lb 12.8 oz (74.8 kg). Body mass index is 23.65 kg/m². Physical Examination: General appearance - alert, well appearing, and in no distress  Mental status - alert, oriented to person, place, and time  Neck - Neck is supple, no JVD or carotid bruits. No thyromegaly or adenopathy. Chest - clear to auscultation, no wheezes, rales or rhonchi, symmetric air entry  Heart - normal rate, regular rhythm, normal S1, S2, loud 3/6 systolic murmur over the entire precordium, no rubs, clicks or gallops  Abdomen - soft, nontender, nondistended, no masses or organomegaly  Neurological - alert, oriented, normal speech, no focal findings or movement disorder noted  Extremities - peripheral pulses normal, no pedal edema, no clubbing or cyanosis  Skin -skin tenting is present    No orders of the defined types were placed in this encounter.       Outpatient Encounter Medications as of 7/21/2022   Medication Sig Dispense Refill    LORazepam (ATIVAN) 1 MG tablet TAKE 1 TABLET BY MOUTH EVERY DAY AT NIGHT 30 tablet 0    donepezil (ARICEPT) 5 MG tablet TAKE 1 TABLET BY MOUTH IN THE EVENING 90 tablet 3    ENTRESTO 24-26 MG per tablet TAKE 1 TABLET BY MOUTH TWO TIMES A DAY 90 tablet 0    aspirin 81 MG EC tablet Take 1 tablet by mouth nightly Indications: Anticoagulant Therapy With food. 30 tablet 3    warfarin (COUMADIN) 5 MG tablet Take as directed by [facility]. 135 tablets = 90 days 135 tablet 3    furosemide (LASIX) 40 MG tablet TAKE 1 TABLET BY MOUTH DAILY 90 tablet 3    atorvastatin (LIPITOR) 20 MG tablet TAKE 1 TABLET BY MOUTH ONE TIME A DAY AT NIGHT 90 tablet 2    allopurinol (ZYLOPRIM) 100 MG tablet TAKE 1 TABLET BY MOUTH EVERY MORNING 90 tablet 3    digoxin (DIGOX) 250 MCG tablet Take 1 tablet by mouth daily 90 tablet 3    carvedilol (COREG) 6.25 MG tablet Take 1 tablet by mouth 2 times daily 180 tablet 3    Melatonin 2.5 MG CHEW Take 1 tablet by mouth nightly      loperamide (IMODIUM) 2 MG capsule Take 2 mg by mouth as needed Indications: Diarrhea      Multiple Vitamins-Minerals (THERAPEUTIC MULTIVITAMIN-MINERALS) tablet Take 1 tablet by mouth 2 times daily Indications: Nutritional Support Noon and Supper      acetaminophen (TYLENOL) 325 MG tablet Take 650 mg by mouth every 6 hours as needed for Pain or Fever Indications: Pain Don't take more than 3,000 mg per day      Calcium Carbonate-Vitamin D (CALCIUM + D PO) Take 1 tablet by mouth 2 times daily Indications: Treatment to Prevent Vitamin Deficiency Noon and Supper      [DISCONTINUED] ENTRESTO 24-26 MG per tablet TAKE 1 TABLET BY MOUTH TWO TIMES A DAY 90 tablet 0     No facility-administered encounter medications on file as of 7/21/2022. DIAGNOSES   Diagnosis Orders   1. Chronic systolic congestive heart failure ([facility]        2. H/O: gout        3. Hyperlipidemia, unspecified hyperlipidemia type        4. Mild cognitive impairment        5. Permanent atrial fibrillation ([facility]        6. Anxiety        7. S/P CABG (coronary artery bypass graft)        8. Kaposi's sarcoma ([facility]        9. S/P AVR (aortic valve replacement)        10. Weight loss        11.  ICD (implantable cardioverter-defibrillator) in place        Weight is down 9 pounds from prior visit  He does have skin tenting  I suspect he is volume depleted  I will have him hold his diuretic and check a stat BMP  I think he does have some baseline dementia  He does not know the year  He cannot spell world backwards      I will refill l Ativan 1 mg at bedtime it helps him sleep   He does have permanent atrial fibrillation on Coumadin  He is having no bleeding  I discussed the situation at length with patient and family and answered all their questions    Follow-up 4 months      Addendum  Creatinine 0.9  BUN 17  We will continue his diuretic  Hemoglobin 13.2

## 2022-08-09 NOTE — PROGRESS NOTES
Medication Management [address]  [phone] (phone)  [phone] (fax)    Mr. Manisha Gallagher is a 80 y.o.  male with history of persistent atrial fib. , per Dr. Litzy Crockett referral, who presents today for Warfarin monitoring and adjustment (3 week visit). Patient verifies tablet strength. Son stated dose. No missed or extra doses. Patient denies bleeding/swelling/SOB/chest pain. Has usual easy bruising. No blood in urine or stool. No dietary changes. No changes in medication/OTC agents/herbals. No change in alcohol use or tobacco use. No change in activity level. Patient denies headaches/dizziness/lightheadedness/falls. No vomiting/diarrhea or acute illness. No procedures scheduled in the future at this time. In Dr. Litzy Crockett July note, mentioned 9# weight loss since March visit; son states there has been no more lost.  Has visiting nurse, Omar Mesa, from [address] [place] [address] Mondays and Wednesdays. Assessment:   Lab Results   Component Value Date    INR 2.10 (H) 08/09/2022    INR 3.10 (H) 07/19/2022    INR 2.50 (H) 06/14/2022     INR therapeutic - goal 2-3. Recent Labs     08/09/22  0909   INR 2.10*        Plan:  POCT INR ordered/performed/result reviewed. Continue PO Coumadin 7.5 mg MF, 5 mg TWThSS. Recheck INR in 4 week(s). Patient reminded to call the Anticoagulation Clinic with any signs or symptoms of bleeding or with any medication changes. Patient given instructions utilizing the teach back method. After visit summary printed and reviewed with patient. Discharged ambulatory in no apparent distress, wearing mask, with son.

## 2022-09-06 NOTE — PROGRESS NOTES
Medication Management [address]   [phone] (phone)  [phone] (fax)    Mr. Svitlana Posadas is a 80 y.o.  male with history of persistent atrial fib. , per Dr. Theo Pope referral,  who presents today for Warfarin monitoring and adjustment (4 week visit). Son verifies current dosing regimen and tablet strength. No missed or extra doses. Patient denies bleeding/swelling/SOB/chest pain. Has usual easy bruising. No blood in urine or stool. No dietary changes. Has gained a little weight back, son states. No changes in medication/OTC agents/herbals. No change in alcohol use or tobacco use. No change in activity level. Patient denies headaches/falls. Has usual dizziness. No vomiting/diarrhea or acute illness. No procedures scheduled in the future at this time. Assessment:   Lab Results   Component Value Date    INR 2.40 (H) 09/06/2022    INR 2.10 (H) 08/09/2022    INR 3.10 (H) 07/19/2022     INR therapeutic - goal 2-3. Recent Labs     09/06/22  0912   INR 2.40*        Plan:  POCT INR ordered/performed/result reviewed. Continue PO Coumadin 7.5 mg MF, 5 mg TWThSS. Recheck INR in 4 week(s). Patient reminded to call the Anticoagulation Clinic with any signs or symptoms of bleeding or with any medication changes. Patient given instructions utilizing the teach back method. After visit summary printed and reviewed with patient/son. Discharged ambulatory in no apparent distress, wearing mask, with son.

## 2022-09-12 NOTE — ED PROVIDER NOTES
[facility] Emergency Department    CHIEF COMPLAINT     No chief complaint on file. Nurses Notes reviewed and I agree except as noted in the HPI. HISTORY OF PRESENT ILLNESS    Hodan Castellano is a 80 y.o. male who presents to the ED for evaluation of right buttocks pain. Patient notes he fell last Wednesday down some stairs. He has developed contusion to the right buttocks. His son notes he went to pick him up today, and he was able to get out of bed. Notes increased swelling to the right hip. Patient denies any pain with movement of the right lower leg. But does note tenderness to the right buttocks. Patient is on Coumadin, son at bedside reports last INR was 2.4 on Tuesday of last week. Patient denies any other symptoms associated with fall. Apparently never hit his head. Denies any neck pain back pain. Denies any numbness or weakness in his extremities. Patient has a past medical history of aortic stenosis with replacement valve. As well as atrial fibrillation. Patient has pace maker in place. Per family the patient has worsening mental status and confusion. Has had multiple falls over the last 6 months and lives alone. HPI was provided by the patient. REVIEW OF SYSTEMS     Review of Systems   Constitutional:  Negative for activity change, chills and fever. HENT:  Negative for congestion and rhinorrhea. Respiratory:  Negative for chest tightness and shortness of breath. Cardiovascular:  Negative for chest pain. Gastrointestinal:  Negative for abdominal distention and abdominal pain. Genitourinary:  Negative for decreased urine volume and difficulty urinating. Musculoskeletal:  Positive for gait problem and joint swelling. Negative for arthralgias, neck pain and neck stiffness. Skin:  Positive for color change. Negative for wound. Allergic/Immunologic: Negative for immunocompromised state.    Neurological:  Negative for dizziness, weakness, light-headedness and numbness. Hematological:  Does not bruise/bleed easily. Psychiatric/Behavioral:  Negative for agitation and confusion. 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 CURRENT MEDICATIONS       Discharge Medication List as of 9/12/2022  2:58 PM        CONTINUE these medications which have NOT CHANGED    Details   LORazepam (ATIVAN) 1 MG tablet TAKE 1 TABLET BY MOUTH EVERY DAY AT NIGHTHistorical Med      allopurinol (ZYLOPRIM) 100 MG tablet TAKE 1 TABLET BY MOUTH EVERY MORNING, Disp-90 tablet, R-3Normal      atorvastatin (LIPITOR) 20 MG tablet TAKE 1 TABLET BY MOUTH ONE TIME A DAY AT NIGHT, Disp-90 tablet, R-3Normal      carvedilol (COREG) 6.25 MG tablet Take 1 tablet by mouth in the morning and 1 tablet before bedtime. , Disp-180 tablet, R-3Normal      digoxin (DIGOX) 250 MCG tablet Take 1 tablet by mouth in the morning., Disp-90 tablet, R-3Normal      donepezil (ARICEPT) 5 MG tablet One tablet by mouth in evening, Disp-90 tablet, R-3Normal      sacubitril-valsartan (ENTRESTO) 24-26 MG per tablet One tablet by mouth two times a day, Disp-90 tablet, R-3Normal      furosemide (LASIX) 40 MG tablet TAKE 1 TABLET BY MOUTH DAILY, Disp-90 tablet, R-3Normal      aspirin 81 MG EC tablet Take 1 tablet by mouth nightly Indications: Anticoagulant Therapy With food. , Disp-30 tablet, R-3Normal      warfarin (COUMADIN) 5 MG tablet Take as directed by [facility]. 135 tablets = 90 days, Disp-135 tablet, R-3Normal      Melatonin 2.5 MG CHEW Take 1 tablet by mouth nightlyHistorical Med      loperamide (IMODIUM) 2 MG capsule Take 2 mg by mouth as needed Indications: DiarrheaHistorical Med      Multiple Vitamins-Minerals (THERAPEUTIC MULTIVITAMIN-MINERALS) tablet Take 1 tablet by mouth 2 times daily Indications: Nutritional Support Noon and Supper      acetaminophen (TYLENOL) 325 MG tablet Take 650 mg by mouth every 6 hours as needed for Pain or Fever Indications: Pain Don't take more than 3,000 mg per dayHistorical Med      Calcium Carbonate-Vitamin D (CALCIUM + D PO) Take 1 tablet by mouth 2 times daily Indications: Treatment to Prevent Vitamin Deficiency Noon and Supper             ALLERGIES     has No Known Allergies. FAMILY HISTORY     He indicated that his mother is . He indicated that his father is . family history includes Heart Disease in his father and mother. SOCIAL HISTORY       Social History     Socioeconomic History    Marital status:       Spouse name: Not on file    Number of children: 8    Years of education: Not on file    Highest education level: Not on file   Occupational History    Not on file   Tobacco Use    Smoking status: Former     Packs/day: 1.00     Years: 50.00     Pack years: 50.00     Types: Cigarettes, Cigars     Quit date: 2008     Years since quittin.3    Smokeless tobacco: Never   Vaping Use    Vaping Use: Never used   Substance and Sexual Activity    Alcohol use: Not Currently     Alcohol/week: 0.0 standard drinks    Drug use: No    Sexual activity: Not on file   Other Topics Concern    Not on file   Social History Narrative    Not on file     Social Determinants of Health     Financial Resource Strain: Not on file   Food Insecurity: Not on file   Transportation Needs: Not on file   Physical Activity: Not on file   Stress: Not on file   Social Connections: Not on file   Intimate Partner Violence: Not on file   Housing Stability: Not on file       PHYSICAL EXAM     INITIAL VITALS:  height is 5' 7\" (1.702 m) and weight is 170 lb (77.1 kg). His oral temperature is 97.7 °F (36.5 °C). His blood pressure is 101/53 (abnormal) and his pulse is 73. His respiration is 16 and oxygen saturation is 96%. Physical Exam  Vitals and nursing note reviewed. Constitutional:       General: He is not in acute distress. Appearance: Normal appearance. He is well-developed. He is not ill-appearing or toxic-appearing. HENT:      Head: Normocephalic. Right Ear: External ear normal.      Left Ear: External ear normal.      Nose: Nose normal.      Mouth/Throat:      Pharynx: Uvula midline. Eyes:      Conjunctiva/sclera: Conjunctivae normal.   Cardiovascular:      Rate and Rhythm: Normal rate and regular rhythm. Heart sounds: Normal heart sounds, S1 normal and S2 normal.   Pulmonary:      Effort: Pulmonary effort is normal. No respiratory distress. Breath sounds: Normal breath sounds. Chest:      Chest wall: No tenderness. Abdominal:      General: Bowel sounds are normal. There is no distension. Palpations: Abdomen is soft. Tenderness: There is no abdominal tenderness. Musculoskeletal:         General: Normal range of motion. Cervical back: Normal range of motion and neck supple. Skin:     General: Skin is warm and dry. Coloration: Skin is not pale. Findings: Bruising (Right buttocks) present. No erythema or rash. Neurological:      General: No focal deficit present. Mental Status: He is alert and oriented to person, place, and time. Cranial Nerves: No cranial nerve deficit. Sensory: No sensory deficit. Motor: No weakness. Psychiatric:         Mood and Affect: Mood normal.         Behavior: Behavior normal.         Thought Content:  Thought content normal.         Judgment: Judgment normal.       DIFFERENTIAL DIAGNOSIS:   Hip fracture strain sprain contusion hematoma  DIAGNOSTIC RESULTS     RADIOLOGY: non-plainfilm images(s) such as CT, Ultrasound and MRI are read by the radiologist.  Plain radiographic images are visualized and preliminarily interpreted by the emergency physician unless otherwise stated below. XR HIP 2-3 VW W PELVIS RIGHT   Final Result   1. No acute bony abnormality. 2. Osteopenia. Final report electronically signed by Dr Olya Randall on 9/12/2022 10:34 AM            LABS:   Labs Reviewed   CBC WITH AUTO DIFFERENTIAL - Abnormal; Notable for the following components:       Result Value    RBC 3.50 (*)     Hemoglobin 9.7 (*)     Hematocrit 30.4 (*)     MCHC 31.9 (*)     RDW-CV 16.5 (*)     RDW-SD 52.0 (*)     Lymphocytes Absolute 0.7 (*)     All other components within normal limits   PROTIME-INR - Abnormal; Notable for the following components:    INR 4.23 (*)     All other components within normal limits   GLOMERULAR FILTRATION RATE, ESTIMATED - Abnormal; Notable for the following components:    Est, Glom Filt Rate 70 (*)     All other components within normal limits   BASIC METABOLIC PANEL   ANION GAP   OSMOLALITY       EMERGENCY DEPARTMENT COURSE:   Vitals:    Vitals:    09/12/22 0836   BP: (!) 101/53   Pulse: 73   Resp: 16   Temp: 97.7 °F (36.5 °C)   TempSrc: Oral   SpO2: 96%   Weight: 170 lb (77.1 kg)   Height: 5' 7\" (1.702 m)       MDM    Patient was seen and evaluated in the emergency department, patient appeared to be in no acute distress, vital signs were reviewed, low blood pressure, noted. Physical exam completed, noted hematoma to the right buttocks. X-rays were obtained, lab work obtained. INR elevated which likely is the cause of increased swelling to right buttocks. No significant anemia was noted. No significant electrolyte abnormality noted. Patient unable to get out of bed on own, family concerned patient unable to care for self due to weakness and worsening confusion.  Would like him placed in skilled nursing facility.  came and discussed options with patient and family. They are willing to go to [facility]. Accommodations were made at [facility] for the patient. He was agreeable with this plan of care. I discussed the patient's elevated INR with medical management clinic, they advised the patient to hold his next dose of Coumadin. Follow-up in their office tomorrow. I discussed this plan of care with the patient and his family and they are amenable with discharge. Advised return to the ER with worsening symptoms. Medications - No data to display    Patient was seenindependently by myself. The patient's final impression and disposition and plan was determined by myself. CRITICAL CARE:   None    CONSULTS:  None    PROCEDURES:  None    FINAL IMPRESSION     1. Contusion of anus, initial encounter    2. Supratherapeutic INR          DISPOSITION/PLAN   Patient discharged    PATIENT REFERREDTO:  [facility]ication [facility]  [address] [place] [address]  Go to   940 am tomorrow    DISCHARGE MEDICATIONS:  Discharge Medication List as of 9/12/2022  2:58 PM          (Please note that portions of this note were completed with a voice recognition program.  Efforts were made to edit the dictations but occasionally words are mis-transcribed.)      Provider:  I personally performed the services described in the documentation,reviewed and edited the documentation which was dictated to the scribe in my presence, and it accurately records my words and actions.     Tu Das CNP 09/12/22 9:14 PM    Michelle Das, APRN - CNP         [employer], APRRENITA - CNP  09/12/22 [id number]

## 2022-09-12 NOTE — DISCHARGE INSTRUCTIONS
Go to med management clinic tomorrow at 940 am. Go to [facility] for further care.

## 2022-09-12 NOTE — CARE COORDINATION
Spoke with son who advised family had started working with [facility] last week for placement. Called Mati at [facility] who is reviewing and will call back. Updated son who declined going to [facility] at this time. Son and patients preference is to go back home for now with family as care givers. Mati at [facility] and [employer], APRN-CNP notified. Electronically signed by Rickey Neves RN on 9/12/2022 at 11:56 AM    Per [facility] Das family has talked and now would like to go [facility]. Reached back out to Mati who is going to continue with review at this time. Electronically signed by Rickey Neves RN on 9/12/2022 at 12:17 PM    Mati completed on site and accepted patient. Family and [employer] CNP aware.     Electronically signed by Rickey Neves RN on 9/12/2022 at 3:04 PM

## 2022-09-12 NOTE — ED NOTES
Pt presents to the ER for R hip pain after a fall 4 days ago. Pt denies hitting his head and is on thinners. Family states that he has a bruise on his R hip. Pt has walked since the fall but is very slow because of the pain.      Tenisha Nixon  09/12/22 0892

## 2022-09-13 NOTE — TELEPHONE ENCOUNTER
Patient was in ER yesterday, had an appointment in Coumadin today in which he did not come. Called the son and patient has been admitted to [facility] for Rehab from a fall that he had last week and just has not recovered from. Appt.  Cancelled today   Please call Gavin Lopez if you have any questions

## 2022-09-17 PROBLEM — A41.9 SEPSIS (HCC): Status: ACTIVE | Noted: 2022-01-01

## 2022-09-17 NOTE — ED PROVIDER NOTES
[place] ENCOUNTER          Pt Name: Luiz Barahona  MRN: [id number]  [place] 1/21/1933  Date of Evaluation: 9/17/2022  Treating Resident Physician: Andie Angel MD  Supervising Physician: Maury Forde MD    CHIEF COMPLAINT       Chief Complaint   Patient presents with    Altered Mental Status    Shortness of Breath     History obtained from child, chart review, and the patient. HISTORY OF PRESENT ILLNESS    HPI    Luiz Barahona is a 80 y.o. male with a past medical history significant for coronary artery disease, hyperlipidemia, aortic stenosis, sick sinus syndrome with pacemaker , presents to the emergency department for evaluation of altered mental status, shortness of breath. Patient had been living independently up until about a week ago when he had a fall at home and was discharged to a nursing home. This morning he was seen by his family at the nursing home and was not quite himself, staff and brought an oxygen tank of the room and said that he is oxygen was low this morning. He subsequently got a chest x-ray which is read as CHF, was evaluated by a physician and sent to the emergency department for further evaluation. Patient does not have any complaints, does not have any aches or pains anywhere, per his family he is not acting himself, he is usually alert and oriented, he is able to answer some questions but denies any complaints. EMS report: Patient was supposedly had an SPO2 in the 70s on room air at the nursing home, was in the 80s on room air for EMS, was placed on a nonrebreather and brought to the hospital.    The patient has no other acute complaints at this time. REVIEW OF SYSTEMS   Review of Systems   Unable to perform ROS: Mental status change   Respiratory:  Positive for shortness of breath.         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    [employer] single ICD 9/22/2011    Persistent atrial fibrillation ([facility] 2/11/2013    SSS (sick sinus syndrome) ([facility]     Status post pacemaker     Past Surgical History:   Procedure Laterality Date    COLONOSCOPY  9/21/2010    Dr Porter Mac  2008    [facility]    EYE SURGERY      PACEMAKER INSERTION           MEDICATIONS     Current Facility-Administered Medications:     norepinephrine (LEVOPHED) 16 mg in sodium chloride 0.9 % 250 mL infusion, 2-100 mcg/min, IntraVENous, Continuous, Loraine Roth MD, Last Rate: 6.6 mL/hr at 09/17/22 1531, 7 mcg/min at 09/17/22 1531    [START ON 9/18/2022] allopurinol (ZYLOPRIM) tablet 100 mg, 100 mg, Oral, Daily, Phuong Cardenas APRN - CNP    [START ON 9/18/2022] aspirin EC tablet 81 mg, 81 mg, Oral, Daily, Phuong Cardenas APRN - CNP    atorvastatin (LIPITOR) tablet 20 mg, 20 mg, Oral, Nightly, Phuong Cardenas APRN - CNP    [START ON 9/18/2022] carvedilol (COREG) tablet 6.25 mg, 6.25 mg, Oral, BID, TERRANCE Snyder - CNP    [START ON 9/18/2022] digoxin (LANOXIN) tablet 250 mcg, 250 mcg, Oral, Daily, TERRANCE Henderson - CNP    donepezil (ARICEPT) tablet 5 mg, 5 mg, Oral, Nightly, TERRANCE Snyder - CNP    [START ON 9/18/2022] furosemide (LASIX) tablet 40 mg, 40 mg, Oral, Daily, TERRANCE Henderson CNP    LORazepam (ATIVAN) tablet 1 mg, 1 mg, Oral, Nightly, TERRANCE Snyder - CNP, 1 mg at 09/17/22 2132    melatonin tablet 3 mg, 3 mg, Oral, Nightly, TERRANCE Snyder - CNP, 3 mg at 09/17/22 2132    multivitamin 1 tablet, 1 tablet, Oral, BID, Phuong Blake Cornea, APRN - CNP sacubitril-valsartan (ENTRESTO) 24-26 MG per tablet 1 tablet, 1 tablet, Oral, BID, TERRANCE Delaney CNP    polyethylene glycol (GLYCOLAX) packet 17 g, 17 g, Oral, Daily PRN, TERRANCE Delaney CNP    acetaminophen (TYLENOL) tablet 650 mg, 650 mg, Oral, Q6H PRN, 650 mg at 22 2131 **OR** acetaminophen (TYLENOL) suppository 650 mg, 650 mg, Rectal, Q6H PRN, TERRANCE Delaney CNP    ondansetron (ZOFRAN-ODT) disintegrating tablet 4 mg, 4 mg, Oral, Q8H PRN **OR** ondansetron (ZOFRAN) injection 4 mg, 4 mg, IntraVENous, Q6H PRN, TERRANCE Henderson CNP    albuterol (PROVENTIL) nebulizer solution 2.5 mg, 2.5 mg, Nebulization, Q2H PRN, TERRANCE Henderson CNP    guaiFENesin-dextromethorphan (ROBITUSSIN DM) 100-10 MG/5ML syrup 5 mL, 5 mL, Oral, Q4H PRN, TERRANCE Delaney CNP    methylPREDNISolone sodium (SOLU-MEDROL) injection 40 mg, 40 mg, IntraVENous, Q12H, TERRANCE Delaney CNP    warfarin placeholder: dosing by pharmacy, , Other, RX Placeholder, TERRANCE Delaney CNP    bumetanide (BUMEX) injection 0.5 mg, 0.5 mg, IntraVENous, Once, TERRANCE Delaney CNP      SOCIAL HISTORY     Social History     Social History Narrative    Not on file     Social History     Tobacco Use    Smoking status: Former     Packs/day: 1.00     Years: 50.00     Pack years: 50.00     Types: Cigarettes, Cigars     Quit date: 2008     Years since quittin.3    Smokeless tobacco: Never   Vaping Use    Vaping Use: Never used   Substance Use Topics    Alcohol use: Not Currently     Alcohol/week: 0.0 standard drinks    Drug use: No         ALLERGIES   No Known Allergies      FAMILY HISTORY     Family History   Problem Relation Age of Onset    Heart Disease Mother     Heart Disease Father          PREVIOUS RECORDS   Previous records reviewed:  Notes from nursing home reviewed Rhiannon Nolasco PHYSICAL EXAM     ED Triage Vitals [09/17/22 1357]   BP Temp Temp Source Heart Rate Resp SpO2 Height Weight   (!) 101/56 (!) 102 °F (38.9 °C) Axillary 79 29 98 % -- --     Initial vital signs and nursing assessment reviewed and abnormal from febrile . Body mass index is 26.23 kg/m². Pulsoximetry is normal per my interpretation. Additional Vital Signs:  Vitals:    09/17/22 1938   BP:    Pulse: 76   Resp: 21   Temp:    SpO2: 97%       Physical Exam  Constitutional:       General: He is not in acute distress. Appearance: Normal appearance. He is normal weight. He is ill-appearing. He is not toxic-appearing or diaphoretic. HENT:      Head: Normocephalic and atraumatic. Nose: Nose normal.      Mouth/Throat:      Mouth: Mucous membranes are moist.      Pharynx: Oropharynx is clear. Eyes:      Conjunctiva/sclera: Conjunctivae normal.   Cardiovascular:      Rate and Rhythm: Normal rate and regular rhythm. Pulses: Normal pulses. Heart sounds: Normal heart sounds. Pulmonary:      Effort: Pulmonary effort is normal. Tachypnea present. Breath sounds: No stridor. Decreased breath sounds present. No wheezing, rhonchi or rales. Abdominal:      General: Abdomen is flat. Bowel sounds are normal.      Palpations: Abdomen is soft. Tenderness: There is no abdominal tenderness. Musculoskeletal:      Cervical back: Normal range of motion. Right lower leg: No tenderness. No edema. Left lower leg: No tenderness. No edema. Skin:     General: Skin is dry. Capillary Refill: Capillary refill takes less than 2 seconds. Comments: Skin hot to touch   Neurological:      Mental Status: He is alert. Comments: Answers questions appropriately however family says he is not is usually self.            MEDICAL DECISION MAKING   Initial Differential Diagnosis: (includes but is not limited to)  Sepsis  Acute on Chronic CHF  Pneumonia  COVID-19  Influenza  Urinary tract infection  Hypoglycemia  Intracranial hemorrhage    Plan:   POCT Glucsoe  IV  Monitor  Continue NRB until BiPAP available  ECG  CBC, BMP, LFT, Trop, BNP, Lactate, Procal.  Chest x-ray  Ultrasound - [facility] Exam    Summary:  Arrives on nonrebreather, given history of CHF and that this may be CHF attacks patient will try BiPAP. Portable chest x-ray shows edema as well as a left lower lobe pneumonia. Swab for COVID is positive, his BNP is elevated, procalcitonin elevated, troponin elevated. Given patient's persistent hypotension and CHF exacerbation we have limited the amount of fluids we gave him and did not give him the 30 mill per kilo bolus. Instead we started norepinephrine early, central line was placed. He was also given antibiotics to cover his sepsis from a pulmonary source, given his recent admission to a nursing home he was given cefepime and vancomycin. CODE STATUS was discussed with his children including his 2 oldest children who are his healthcare decision-makers, they would like DO NOT RESUSCITATE, critical care arrest, they are okay with placement of central line and initiation of pressors however do not want him to be intubated. Patient discussed with ICU team Sary Figueroa, accepts patient for admission.     ED RESULTS   Laboratory results:  Labs Reviewed   COVID-19, RAPID - Abnormal; Notable for the following components:       Result Value    SARS-CoV-2, NAAT DETECTED (*)     All other components within normal limits   CBC WITH AUTO DIFFERENTIAL - Abnormal; Notable for the following components:    RBC 3.69 (*)     Hemoglobin 10.4 (*)     Hematocrit 32.1 (*)     RDW-CV 17.4 (*)     RDW-SD 53.6 (*)     Segs Absolute 8.9 (*)     Lymphocytes Absolute 0.8 (*)     Immature Grans (Abs) 0.18 (*)     All other components within normal limits   BASIC METABOLIC PANEL W/ REFLEX TO MG FOR LOW K - Abnormal; Notable for the following components:    BUN 23 (*)     Calcium 8.3 (*)     All other components within normal limits   HEPATIC FUNCTION PANEL - Abnormal; Notable for the following components:    Albumin 3.2 (*)     Total Bilirubin 1.6 (*)     Bilirubin, Direct 0.7 (*)     Alkaline Phosphatase 148 (*)     AST 54 (*)     Total Protein 5.3 (*)     All other components within normal limits   TROPONIN - Abnormal; Notable for the following components:    Troponin T 0.020 (*)     All other components within normal limits   BRAIN NATRIURETIC PEPTIDE - Abnormal; Notable for the following components:    Pro-BNP 6799.0 (*)     All other components within normal limits   PROTIME-INR - Abnormal; Notable for the following components:    INR 2.96 (*)     All other components within normal limits   LACTATE, SEPSIS - Abnormal; Notable for the following components:    Lactic Acid, Sepsis 2.0 (*)     All other components within normal limits   PROCALCITONIN - Abnormal; Notable for the following components:    Procalcitonin 0.42 (*)     All other components within normal limits   URINE WITH REFLEXED MICRO - Abnormal; Notable for the following components:    Protein,  (*)     Color, UA DK YELLOW (*)     Character, Urine CLOUDY (*)     All other components within normal limits   GLOMERULAR FILTRATION RATE, ESTIMATED - Abnormal; Notable for the following components:    Est, Glom Filt Rate 70 (*)     All other components within normal limits   BASIC METABOLIC PANEL - Abnormal; Notable for the following components:    Glucose 114 (*)     BUN 24 (*)     All other components within normal limits   HEMOGLOBIN AND HEMATOCRIT - Abnormal; Notable for the following components:    Hemoglobin 10.0 (*)     Hematocrit 30.5 (*)     All other components within normal limits   TROPONIN - Abnormal; Notable for the following components:    Troponin T 0.016 (*)     All other components within normal limits   GLOMERULAR FILTRATION RATE, ESTIMATED - Abnormal; Notable for the following components:    Est, Glom Filt Rate 70 (*)     All other components within normal limits   POCT GLUCOSE - Abnormal; Notable for the following components:    POC Glucose 110 (*)     All other components within normal limits   RAPID INFLUENZA A/B ANTIGENS   CULTURE, BLOOD 1   CULTURE, BLOOD 2   CULTURE, REFLEXED, URINE   VRE SCREEN BY PCR   CULTURE, URINE   LIPASE   APTT   LACTATE, SEPSIS   ANION GAP   CORTISOL TOTAL   ANION GAP   MRSA BY PCR   PROTIME-INR   BLOOD GAS, VENOUS   VITAMIN D 25 HYDROXY   FIBRINOGEN   LACTATE DEHYDROGENASE   C-REACTIVE PROTEIN   PROCALCITONIN   FERRITIN       Radiologic studies results:  CT Head WO Contrast   Final Result   No acute intracranial process. .          **This report has been created using voice recognition software. It may contain minor errors which are inherent in voice recognition technology. **      Final report electronically signed by Dr. Yary Jordan on 9/17/2022 6:50 PM      XR CHEST PORTABLE   Final Result   1. Moderate cardiomegaly. Permanent pacemaker/defibrillator. 2 prosthetic heart valves. Metallic sternotomy sutures. A right jugular line has been inserted with tip in the SVC. Calcified pleural plaques are again seen bilaterally. Cannot exclude a tiny    effusion right side. 2. Moderate interstitial infiltrates throughout both lungs, consistent with interstitial pneumonia/edema. Overall appearance of chest somewhat worse than prior. **This report has been created using voice recognition software. It may contain minor errors which are inherent in voice recognition technology. **      Final report electronically signed by Dr. Yary Jordan on 9/17/2022 5:18 PM      XR CHEST PORTABLE   Final Result   1. Moderate Noé. Permanent pacemaker/February. 2 prosthetic heart valves. Metallic sternotomy sutures. Prominent pleural plaque along right hemidiaphragm and another along the lateral aspect of the left lung base. 2. Moderate pneumonia/pulmonary edema both mid and lower lung fields, most severe left lung base. **This report has been created using voice recognition software. It may contain minor errors which are inherent in voice recognition technology. **      Final report electronically signed by Dr. Rakel Tavera on 9/17/2022 2:53 PM      XR CHEST PORTABLE    (Results Pending)       ED Medications administered this visit:   Medications   norepinephrine (LEVOPHED) 16 mg in sodium chloride 0.9 % 250 mL infusion (7 mcg/min IntraVENous Rate/Dose Change 9/17/22 1531)   allopurinol (ZYLOPRIM) tablet 100 mg (has no administration in time range)   aspirin EC tablet 81 mg (has no administration in time range)   atorvastatin (LIPITOR) tablet 20 mg (has no administration in time range)   carvedilol (COREG) tablet 6.25 mg (has no administration in time range)   digoxin (LANOXIN) tablet 250 mcg (has no administration in time range)   donepezil (ARICEPT) tablet 5 mg (has no administration in time range)   furosemide (LASIX) tablet 40 mg (has no administration in time range)   LORazepam (ATIVAN) tablet 1 mg (1 mg Oral Given 9/17/22 2132)   melatonin tablet 3 mg (3 mg Oral Given 9/17/22 2132)   multivitamin 1 tablet (has no administration in time range)   sacubitril-valsartan (ENTRESTO) 24-26 MG per tablet 1 tablet (has no administration in time range)   polyethylene glycol (GLYCOLAX) packet 17 g (has no administration in time range)   acetaminophen (TYLENOL) tablet 650 mg (650 mg Oral Given 9/17/22 2131)     Or   acetaminophen (TYLENOL) suppository 650 mg ( Rectal See Alternative 9/17/22 2131)   ondansetron (ZOFRAN-ODT) disintegrating tablet 4 mg (has no administration in time range)     Or   ondansetron (ZOFRAN) injection 4 mg (has no administration in time range)   albuterol (PROVENTIL) nebulizer solution 2.5 mg (has no administration in time range)   guaiFENesin-dextromethorphan (ROBITUSSIN DM) 100-10 MG/5ML syrup 5 mL (has no administration in time range)   methylPREDNISolone sodium (SOLU-MEDROL) injection 40 mg (has no administration in time range)   warfarin placeholder: dosing by pharmacy (has no administration in time range)   bumetanide (BUMEX) injection 0.5 mg (has no administration in time range)   lactated ringers bolus (0 mLs IntraVENous Stopped 9/17/22 1624)   acetaminophen (TYLENOL) tablet 1,000 mg (1,000 mg Oral Given 9/17/22 1451)   cefepime (MAXIPIME) 2000 mg IVPB minibag (0 mg IntraVENous Stopped 9/17/22 1751)   vancomycin (VANCOCIN) 2,000 mg in dextrose 5 % 500 mL IVPB (0 mg IntraVENous Stopped 9/17/22 2136)         ED COURSE     ED Course as of 09/17/22 2150   Sat Sep 17, 2022   1448 WBC: 10.7  Up from 6.5, 5 days ago. [SC]   1515 Hypotensive now with MAP in 55. BipAP started. Consent for central line signed by patient's son and medical power of . Also discussed code status, all three children would like DNR-CCA at this time, no intubation. [SC]   1645 Right IJ CVC [SC]   80 Spoke with Dr. Lauren Mejia (ICU) who accepts patient for admission. [SC]   1702 Troponin T(!): 0.020 [SC]   1702 Procalcitonin(!): 0.42 [SC]   1702 INR(!): 2.96 [SC]   1702 Pro-BNP(!): 6799.0 [SC]   1702 SARS-CoV-2, NAAT(!!): DETECTED [SC]      ED Course User Index  [SC] Loraine Roth MD         PROCEDURES   Central Line    Date/Time: 9/17/2022 4:45 PM  Performed by: Loraine Roth MD  Authorized by: Manny Hoyos MD     Consent:     Consent obtained:  Written and verbal    Consent given by:  Healthcare agent    Risks, benefits, and alternatives were discussed: yes      Risks discussed:  Arterial puncture, incorrect placement, nerve damage, bleeding, infection and pneumothorax    Alternatives discussed:  No treatment and delayed treatment  Pre-procedure details:     Indication(s): central venous access and insufficient peripheral access      Indication(s) comment:  Initation of pressors    Hand hygiene: Hand hygiene performed prior to insertion      Sterile barrier technique:  All elements of maximal sterile technique followed      Skin preparation:  Chlorhexidine    Skin preparation agent: Skin preparation agent completely dried prior to procedure    Sedation:     Sedation type:  None  Anesthesia:     Anesthesia method:  Local infiltration    Local anesthetic:  Lidocaine 1% w/o epi  Procedure details:     Location:  R internal jugular    Patient position: 20 degree angle as patient could not tolerate supine. Procedural supplies:  Triple lumen    Catheter size:  7 Fr (16 cm)    Landmarks identified: yes      Ultrasound guidance: yes      Ultrasound guidance timing: prior to insertion and real time      Sterile ultrasound techniques: Sterile gel and sterile probe covers were used      Number of attempts:  1    Successful placement: yes    Post-procedure details:     Post-procedure:  Dressing applied and line sutured    Assessment:  Blood return through all ports, no pneumothorax on x-ray, free fluid flow and placement verified by x-ray    Procedure completion:  Tolerated      MEDICATION CHANGES     Current Discharge Medication List            FINAL DISPOSITION     Final diagnoses:   COVID-19   Pneumonia due to infectious organism, unspecified laterality, unspecified part of lung   Hypoxia       Condition: condition: serious  Dispo: Admit to CCU/ICU      This transcription was electronically signed. Parts of this transcriptions may have been dictated by use of voice recognition software and electronically transcribed, and parts may have been transcribed with the assistance of an ED scribe. The transcription may contain errors not detected in proofreading. Please refer to my supervising physician's documentation if my documentation differs.     Electronically Signed: Mary Jo Diaz MD, 09/17/22, 9:50 PM          Geovanny Alejo MD  Resident  09/17/22 [id number]       Geovanny Alejo MD  Resident  09/17/22 0807

## 2022-09-17 NOTE — H&P
CRITICAL CARE PROGRESS NOTE      Patient:  Blair Candelaria    Unit/Bed:4D-13/013-A  YOB: 1933  MRN: [id number]   PCP: Kole Justin MD  Date of Admission: 9/17/2022  Chief Complaint:-Shortness of breath and hypoxia    Assessment and Plan:    Acute respiratory failure, hypoxia: Patient had failed use of oxygen via nasal cannula and nonrebreather. Improvement was noted after use of BiPAP and this was transitioned to high flow nasal cannula at time of admission to the ICU. Titrate to maintain SaO2>90%. Severe pneumonia  ?aspiration/COVID 19. Cefepime and Vancomycin were given while in the ER will continue Cefepime. Solumedrol was added. Repeat Chest Xray. Septic Shock:  +SIRS 4/4, Lactic Acid 2.0-1.5, PCT 4.72. Levophed and Vasopressin needed to maintain PHE>93  Complicated UTI:  Booker was placed while in the ER. Culture is pending. HFrEF:  ECHO 2020 LVEF 40-45%, global hypokinesis, bioprosthetic AVR, mild MR, Entresto and Lasix were continued with parameters to hold. Repeat ECHO. Persistent Atrial Fibrillation:  [id number]=1, Coumadin to be dosed per pharmacy, monitor. INITIAL H AND P AND ICU COURSE:  Saloni Sharpe is a 44-year-old  male admitted to [address] [place] [address] ICU on 8/17/2022 with chief complaint of shortness of breath and hypoxia. Patient has past medical history significant for former smoker, ASHD s/p CABG, AICD, CAD LHC-2016 Dr. Doris Camejo did a cardiac catheterization which did show some CAD, not really amenable to intervention, ECHO 2020 LVEF 40-45%, global hypokinesis of the left ventricle, bioprosthetic aortic valve, myxomatotic degeneration of the mitral valve, mild MR, Persistent atrial fibrillation, GERD, dyslipidemia, kaposi's sarcoma. Saloni Sharpe presented to [place] [facility] [address] ER via EMS with chief complaint of shortness of breath and increased confusion. EMS states patient's oxygen saturation was 70% at [facility].   Patient was placed on     15 L nonrebreather mask SaO2 was up to 97%. Tmax 102. SBP . Oxygen was bridged to BIPAP. Lactic Acid 2.0-1.5. RIJ CVC was placed and Levophed gtt was started. COVID testing was positive. Patient had been living independently up until about a week ago when he had a fall at home and was discharged to a nursing home. This morning he was seen by his family at the nursing home and was not quite himself. Hypoxia was reported 24 hours prior to admission which O2NC was applied at [facility]. AM of admission his oxygen was low. Positive for a loose productive cough for the past week. Positive for rigors and fever AM of admission. He subsequently got a chest x-ray AM at [facility] which read as CHF, was evaluated by a physician and sent to the emergency department for further evaluation. Positive for confusion and hypotension at time of admission to the ICU. Past Medical History:  see HPI. Family History: Mother -CAD, Father -CAD. Social History:  former smoker, denies any illicit drug use of ETOH. Patient is a . ROS   GENERAL: Positive for fever chills weakness poor appetite   SKN: No lesions or rashes.   HEAD: No headaches or recent injury  EYES: No acute changes in vision, no diplopia or blurred vision  EARS: No hearing loss, no tinnitus  NOSE/THROAT: No rhinorrhea or pharyngitis, no nasal drainage  NECK: No lumps or unusual neck stiffness  PULMONARY: Positive for hypoxia and tachypnea, loose productive cough  CARDIAC: Positive hypotension  GI: No history melena or hematochezia, no diarrhea or constipation  PERIPHERAL VASCULAR: No intermittent claudication or unusual leg cramps  MUSCULOSKELETAL: Occasional arthralgias, myalgias  NEUROLOGICAL: No Seizures or Syncope  HEMATOLOGIC:  No anemia, unusual bruising or bleeding  PSYCH: No homicidal or suicidal ideation    Scheduled Meds:   vancomycin  25 mg/kg IntraVENous Once     Continuous Infusions:   norepinephrine 7 mcg/min (22 4003) PHYSICAL EXAMINATION:  T: 100.2. P: 76. RR: 24. B/P: 125/50. FiO2: 80. O2 Sat: 94.  I/O: Pending  Body mass index is 26.23 kg/m². GCS:   13  General:   pale warm and dry  HEENT:  normocephalic and atraumatic. No scleral icterus. PERR  Neck: supple. No Thyromegaly. Lungs: clear to auscultation. , diminished on right side and coarse crackles on the left side. No retractions. Bipap  Cardiac: RRR. No JVD. Abdomen: soft. Nontender, bowel sounds are present  Extremities:  No clubbing, cyanosis, or edema x 4. Vasculature: capillary refill < 3 seconds. Palpable dorsalis pedis pulses. Skin:  warm and dry. Psych:  Alert and oriented x32  Affect appropriate  Lymph:  No supraclavicular adenopathy. Neurologic:  No focal deficit. No seizures. Data: (All radiographs, tracings, PFTs, and imaging are personally viewed and interpreted unless otherwise noted). 9/17/2022 1531 sodium 139 potassium 4.4 chloride 105 CO2 25  BUN 23 creatinine 1.0  Lactic acid 2.0-1.5  Calcium is 8.3  Procalcitonin 0.42  P BNP 6722  Troponin is 0.020  Albumin 3.2 alk phos is 148 ALT 31 AST is 54 total bili is 0.7  White count 10.7 hemoglobin 10.4 hematocrit 32.1 platelet count 437  INR 2.96  Influenza A-negative influenza B-negative  COVID-19-positive  Urine dark yellow 100 protein 10-15 WBCs with moderate bacteria  9/17/2022 CT head no acute intracranial process moderate diffuse cerebral cortical atrophy. No acute infarction mass lesion or intracranial hemorrhage  Chest x-ray moderate cardiomegaly right jugular line tip in the SVC moderate interstitial infiltrates throughout both lungs consistent with interstitial pneumonia/edema  Cardiac telemetry NSR        Seen with multidisciplinary ICU team yes.   Meets Continued ICU Level Care Criteria:      [] No - Transfer Planned to listed location:  [] HOSPITALIST CONTACTED-      Case and plan discussed with Dr. David Pierre        Electronically signed by Jennifer Thornton, APRN - CNP  CRITICAL CARE SPECIALIST     Patient seen and examined independently by me. Above discussed and I agree with Chata Eubanks, and the abovenote except where indicated in the EMR revision history. Also see my additional comments and changes indicated by discrete font, text color, italics, and/or initials. Labs, cultures, and radiographs where available were reviewed. Changes were made in the orders as necessary. I discussed patient concerns with Terrance'marielle CLOUD.LAVINIA and instructions were given. Respiratory care issues addressed. Please see our orders for the updated patient care plan.   35 mins  Electronically signed by     Aditi Brian MD on 9/18/2022 at 10:21 AM

## 2022-09-17 NOTE — PROGRESS NOTES
Patient arrived to unit from ED via bed to ICU room 13. Patient transferred to ICU bed and placed on continuous ICU bedside monitor. Patient admitted for Hypoxia [R09.02]  Sepsis ([facility] [address] [A41.9]  Pneumonia due to infectious organism, unspecified laterality, unspecified part of lung [J18.9]  COVID-19 [U07.1]. Vitals obtained. See flowsheets. Patient's IV access includes CVC right IJ. Current infusions and rates of infusion include Levophed at 7 mcg/min and Vancomycin. Assessment completed by Jenny Donis. Two nurse skin assessment completed by Jenny Donis and NICO Brandon. See flowsheets for assessment details. Policies and procedures of ICU able to be explained to patient at this time. Family member(s)/representative(s) present at time of admission include n/. Patient rights explained to family member(s)/representatives and patient, as able. Patient/patient's family member(s)/representative(s) N/A to have physician notified of their admission. All questions posed by patient's family member(s)/representative(s) and patient answered at this time.

## 2022-09-17 NOTE — ED NOTES
Dr. Lockhart Nurse at bedside      [facility] [address] [place] [address]  09/17/22 2347

## 2022-09-18 PROBLEM — J18.9 PNEUMONIA DUE TO INFECTIOUS ORGANISM: Status: ACTIVE | Noted: 2022-01-01

## 2022-09-18 PROBLEM — U07.1 COVID-19: Status: ACTIVE | Noted: 2022-01-01

## 2022-09-18 PROBLEM — J96.01 ACUTE RESPIRATORY FAILURE WITH HYPOXIA (HCC): Status: ACTIVE | Noted: 2022-01-01

## 2022-09-18 NOTE — PLAN OF CARE
Problem: Respiratory - Adult  Goal: Achieves optimal ventilation and oxygenation  Outcome: Progressing   Patient mutually agreed on goals. Family mutually agreed on goals. Unable to get agreement for goals because no family is present and patient cannot respond.

## 2022-09-18 NOTE — PLAN OF CARE
Problem: Discharge Planning  Goal: Discharge to home or other facility with appropriate resources  9/18/2022 1000 by Rolf Dowd RN  Outcome: Progressing  Note: Communication with treatment team and patient about discharge plan and appropriate resources. Problem: Safety - Adult  Goal: Free from fall injury  9/18/2022 1000 by Rolf Dowd RN  Outcome: Progressing  Note: The patient has been assessed for falls and the room room has been assessed for hazards to safety. Hand hygeine has been performed upon additional hazards to their safety. Will continue to monitor and assess throughout my hourly rounding. Problem: Skin/Tissue Integrity  Goal: Absence of new skin breakdown  Description: 1. Monitor for areas of redness and/or skin breakdown  2. Assess vascular access sites hourly  3. Every 4-6 hours minimum:  Change oxygen saturation probe site  4. Every 4-6 hours:  If on nasal continuous positive airway pressure, respiratory therapy assess nares and determine need for appliance change or resting period. 9/18/2022 1000 by Rolf Dowd RN  Outcome: Progressing  Note: No new signs of injury or skin breakdown noted. Hourly rounding performed. Skin integrity, elimination, circulation assessed assessed hourly. Will continue to monitor. Problem: Pain  Goal: Verbalizes/displays adequate comfort level or baseline comfort level  9/18/2022 1000 by Rolf Dowd RN  Outcome: Progressing  Note: Patient displays improved well-being such as baseline levels for pulse, BP, respirations and relaxed muscle tone or body posture. Monitor using 0-10 pain scale.

## 2022-09-18 NOTE — PROGRESS NOTES
Warfarin Pharmacy Consult Note    Warfarin Indication: A fib/flutter  Target INR: 2.0-3.0  Dose prior to admission: 7.5 mg MF, 5mg all other days    Recent Labs     09/17/22  1532 09/18/22  0446   INR 2.96* 3.34*     Recent Labs     09/17/22  1330 09/17/22  2020 09/18/22  0446   HGB 10.4* 10.0* 10.8*     --  302     Concurrent anticoagulants/antiplatelets: aspirin  Significant warfarin drug-drug interactions: allopurinol and solu-medrol     Date INR Warfarin Dose   9/17/22 2.96 None today - no doses on MAR since 9/15. INR to be drawn in AM.   9/18/22  3.34  No warfarin                                                  Monitoring:                   INR will be monitored daily until therapeutic INR is achieved.

## 2022-09-18 NOTE — PROGRESS NOTES
CRITICAL CARE PROGRESS NOTE      Patient:  Hodan Castellano    Unit/Bed:4D-13/013-A  YOB: 1933  MRN: [id number]   PCP: Betty Orozco MD  Date of Admission: 9/17/2022  Chief Complaint:- Altered mental status and Shortness of breath and hypoxia    Assessment and Plan:    Acute hypoxic respiratory failure: etiology COVID vs PNA vs acute on chronic CHF exacerbation. Troponin 0.02, pro-BNP 6799. Patient had failed use of oxygen via nasal cannula and nonrebreather. Chest x-ray on 9/7/2022 demonstrated moderate cardiomegaly. Presented with temperature of 102 respiratory rate of 21 blood pressure 81/41. Moderate interstitial infiltrates throughout both lungs consistent with interstitial pneumonia or edema. O2 sats improvement was noted after use of BiPAP and this was transitioned to high flow nasal cannula at time of admission to the ICU. Titrate to maintain SaO2>90%. Severe pneumonia  etiology ? aspiration/COVID 19. WBC 26.1 on 9/18/2022. Cefepime and Vancomycin were given while in the ER will continue Cefepime. Solumedrol was added. Repeat Chest Xray daily until discontinued. Swap for nares pending. Pro-Adam 4.72. Oxygen saturation was in 70s in the nursing home. Currently on antibiotics. Plan to discontinue vancomycin with nares result. Mixed Shock: Septic Vs Cardiogenic +SIRS 4/4, with pulmonary infiltrates, with respiratory cultures pending, and recent COVID infection. Lactic Acid 2.0-1.5, PCT 4.72. cardiogenic from the parameters above. Levophed and Vasopressin needed to maintain MAP>65. Continue pressors. Discontinue antihypertensives for now. Suspected UTI:  Booker was placed while in the ER. Culture is pending. Currently on antibiotics. Urinalysis demonstrated cloudy dark urine with proteinuria. Will change to susceptibility with antibiotics accordingly.   HFrEF:  ECHO 2020 LVEF 40-45%, global hypokinesis, bioprosthetic AVR, mild MR, Entresto and Lasix were continued with parameters to hold. Repeat ECHO. Echo results pending. Dyslipidemia currently on home medication atorvastatin tablet 20mg. Continue    Persistent Atrial Fibrillation:  [id number]=1, on Coumadin. INR is therapeutic at 3.34. Coumadin to be dosed per pharmacy, monitor on telemetry daily. Dementia continue home medication Aricept         INITIAL H AND P AND ICU COURSE:    Faisal Packer is a 41-year-old  male admitted to [facility] ICU on 8/17/2022 with chief complaint of shortness of breath and hypoxia. Patient has past medical history significant for former smoker, ASHD s/p CABG, AICD, CAD LHC-2016 Dr. Abigail Wesley did a cardiac catheterization which did show some CAD, not really amenable to intervention, ECHO 2020 LVEF 40-45%, global hypokinesis of the left ventricle, bioprosthetic aortic valve, myxomatotic degeneration of the mitral valve, mild MR, Persistent atrial fibrillation, GERD, dyslipidemia, kaposi's sarcoma. Faisal Packer presented to [place] [facility] ER via EMS with chief complaint of shortness of breath and increased confusion. EMS states patient's oxygen saturation was 70% at [facility]. Patient was placed on 15 L nonrebreather mask SaO2 was up to 97%. Tmax 102. SBP . Oxygen was bridged to BIPAP. Lactic Acid 2.0-1.5. RIJ CVC was placed and Levophed gtt was started. COVID testing was positive. Patient had been living independently up until about a week ago when he had a fall at home and was discharged to a nursing home. This morning he was seen by his family at the nursing home and was not quite himself. Hypoxia was reported 24 hours prior to admission which O2NC was applied at [facility]. AM of admission his oxygen was low. Positive for a loose productive cough for the past week. Positive for rigors and fever AM of admission. He subsequently got a chest x-ray AM at [facility] which read as CHF, was evaluated by a physician and sent to the emergency department for further evaluation.  Positive for confusion and hypotension at time of admission to the ICU.     2022 is currently on pressors. Discontinue the antihypertensives for now. Procalcitonin 4.72 ferritin 492 lactate dehydrogenase 314 WBC 26.1. Is currently stable. Past Medical History: Aortic stenosis, coronary artery disease, GERD, hyperlipidemia, Kaposi's sarcoma, persistent atrial fibrillation, sick sinus syndrome. Family History: Father is . Mother is . Family history includes heart disease in mother and father. Social History: He is . He has 8 children. Has a pack year of 15 years. He quit smoking in 2008. ROS limited because of BiPAP use. Scheduled Meds:   cefepime  1,000 mg IntraVENous Q8H    allopurinol  100 mg Oral Daily    aspirin  81 mg Oral Daily    atorvastatin  20 mg Oral Nightly    [Held by provider] carvedilol  6.25 mg Oral BID    digoxin  250 mcg Oral Daily    donepezil  5 mg Oral Nightly    furosemide  40 mg Oral Daily    LORazepam  1 mg Oral Nightly    melatonin  3 mg Oral Nightly    multivitamin  1 tablet Oral BID    [Held by provider] sacubitril-valsartan  1 tablet Oral BID    methylPREDNISolone  40 mg IntraVENous Q12H    warfarin placeholder: dosing by pharmacy   Other RX Placeholder     Continuous Infusions:   norepinephrine 15 mcg/min (22 0746)    vasopressin (Septic Shock) infusion 0.04 Units/min (22 0744)       PHYSICAL EXAMINATION:  T: 99.  P: 71. RR: 24. B/P: 139/61. FiO2: 40 on 50 flow rate. O2 Sat: 100. I/O: 46.8/46.8  Body mass index is 25.9 kg/m². GCS:   14    General:   He is calm. He is not in any acute respiratory distress. HEENT:  normocephalic and atraumatic. No scleral icterus. PERRL  Neck: supple. No Thyromegaly. Lungs: clear to auscultation. No retractions  Cardiac: systolic murmur in the right upper sternal boarder. No JVD. Abdomen: soft. Nontender. Extremities:  No clubbing, cyanosis, or edema x 4.     Vasculature: capillary refill < 3 seconds. Palpable dorsalis pedis pulses. Skin:  warm and dry. Psych:  Alert and oriented x3. Affect appropriate  Lymph:  No supraclavicular adenopathy. Neurologic:  No focal deficit. No seizures. Data: (All radiographs, tracings, PFTs, and imaging are personally viewed and interpreted unless otherwise noted). Sodium 9 potassium 4.1 chloride 102 bicarb 25 BUN 26 creatinine 0.8 anion gap 12 glucose 136 serum calcium 8.3 CRP 9.12 lactate dehydrogenase 314 albumin 3.3 alkaline phosphatase 149 alkaline transaminase 78 respiratory transaminases 27 bilirubin 2.3  WBC 26.1 hemoglobin 10.8 platelets 555  Telemetry shows normal sinus rhythm  Chest x-ray from 9/18/2022 demonstrates a moderate cardiogenic pulmonary edema. CT scan head without contrast demonstrates mild enlargement of the ventricles diffusely, consistent with a central atrophy. Moderate diffuse cerebral cortical atrophy and cerebellar cortical atrophy. Seen with multidisciplinary ICU team No  Meets Continued ICU Level Care Criteria:    [x] Yes   [] No - Transfer Planned to listed location:  [] HOSPITALIST CONTACTED-      Case and plan discussed with Dr. Alli Chery.         Electronically signed by Shanna Dutton DO  [address]

## 2022-09-18 NOTE — PLAN OF CARE
Problem: Respiratory - Adult  Goal: Achieves optimal ventilation and oxygenation  9/18/2022 1321 by Ynes Yung RCP  Outcome: Progressing   Continue to wean high flow cannula as pt tolerates. Unable to get agreement for goals because no family is present and patient is confused.

## 2022-09-19 NOTE — PROGRESS NOTES
Echocardiogram attempted, nurse in room asked for me to try back again later. Will try again when time allows.

## 2022-09-19 NOTE — PROGRESS NOTES
[facility] THERAPY MISSED TREATMENT NOTE  [facility] ICU 4D      Date: 2022  Patient Name: Shiraz Diaz        MRN: [id number]    : 1933  (80 y.o.)    REASON FOR MISSED TREATMENT: ST received an order from Rosalina Lucero MD to complete CSE. Upon ST arrival at [address], [address] deferred CSE at this time d/t increased pt confusion. RN requested ST return at a later time this date if pt is more appropriate for PO intake. Vicky Urbina MA., Seema / AL#.[phone]

## 2022-09-19 NOTE — PROGRESS NOTES
Comprehensive Nutrition Assessment    Type and Reason for Visit:  Initial (NPO monitor, RN reports unable to complete swallow eval per SLP due to increased confusion)    Nutrition Recommendations/Plan:   If unable to safely swallow and feeding tube placed, recommend Vital 1.2 tube feed at 10ml/ hour, increase by 10ml every 8 hours as tolerated to goal 50ml/ hour (= 1440 kcals, 90 grams protein, 133 grams CHO, 6 grams fiber, 973ml free water in 1200ml/ 24 hours); additional free water flush as per Physician      Malnutrition Assessment:  Malnutrition Status:  Insufficient data (09/19/22 1540)    Context:  Acute Illness     Findings of the 6 clinical characteristics of malnutrition:  Energy Intake:  Unable to assess (NPO x day 2)  Weight Loss:  No significant weight loss (past 2 months per EMR)     Body Fat Loss:  Unable to assess     Muscle Mass Loss:  Unable to assess    Fluid Accumulation:  Unable to assess     Strength:  Not Performed    Nutrition Assessment:     Pt. nutritionally compromised AEB NPO status due to unable to complete swallow eval due to increased confusion per Speech Therapy. At risk for further nutrition compromise r/t respiratory failure, pneumonia, COVID and underlying medical condition (CAD, GERD, dementia).        Nutrition Related Findings:    Pt. Report/Treatments/Miscellaneous: +COVID diagnosis 9/17/22, high flow nasal cannula, RN reports Speech therapy unable to complete swallow eval this morning due to patient's increased confusion, no family present currently and attempted call to [facility] however unable to reach ECF staff   GI Status: BM 0   Pertinent Labs: Sodium 140, Potassium 4.1, BUN 31, Creatinine 0.9, Glucose 163  Pertinent Meds: maxipime, decadron, solumedrol, doxycycline IV, lasix, MVI, levophed, vasopressin      Wound Type: None       Current Nutrition Intake & Therapies:     NPO x day 2     No diet orders on file    Anthropometric Measures:  Height: 5' 7\" (170.2 cm)  Ideal Body Weight (IBW): 148 lbs (67 kg)    Admission Body Weight: 167 lb 8 oz (76 kg) (9/17; trace edema)  Current Body Weight: 165 lb 5.5 oz (75 kg) (9/18; trace edema), 111.7 % IBW. Current BMI (kg/m2): 25.9  Usual Body Weight:  (per EMR: 7/21/22 164# 13oz)                       BMI Categories: Overweight (BMI 25.0-29. 9)    Estimated Daily Nutrient Needs:  Energy Requirements Based On: Kcal/kg  Weight Used for Energy Requirements: Current (75kgm on 9/18)  Energy (kcal/day): 9511-8253 kcals (18-20)  ACUTE PHASE  Weight Used for Protein Requirements: Current (75kgm on 9/18)  Protein (g/day):  grams (1.2-2) monitoring renal status  Method Used for Fluid Requirements: Other (Comment)  Fluid (ml/day): as per Physician    Nutrition Diagnosis:   Inadequate oral intake related to swallowing difficulty as evidenced by NPO or clear liquid status due to medical condition    Nutrition Interventions:   Food and/or Nutrient Delivery:  (diet as per Speech Therapy/ Doctor)  Nutrition Education/Counseling: No recommendation at this time  Coordination of Nutrition Care: Continue to monitor while inpatient, Interdisciplinary Rounds       Goals:     Goals: Meet at least 75% of estimated needs, by next RD assessment       Nutrition Monitoring and Evaluation:      Food/Nutrient Intake Outcomes:  (NPO status)  Physical Signs/Symptoms Outcomes: Biochemical Data, Nutrition Focused Physical Findings, Skin, Weight, GI Status, Fluid Status or Edema, Hemodynamic Status    Discharge Planning:     Too soon to determine     Chester Donis RD, LD  Contact: [phone]

## 2022-09-19 NOTE — CARE COORDINATION
9/19/22, 11:05 AM EDT  Discharge Planning Evaluation  Social work consult received, [facility] from [facility]. Daughter Terry Burks ([facility]) preference is to return to [facility]. The patient's current payor source at the facility is  Medicare. Medicare skilled days available:  YES  Insurance precert:   NO  Spoke with  Yovana at the facility. Patient bed hold:  YES  Anticipated transport plan: To be determined. Do they require COVID 19 test to return to F: No, pt + covid  Is there a required time frame which which COVID test needs done: n/a  Patient's Healthcare Decision Maker: Legal Next of [facility] spoke with daughter Terry Burks, states she has medical POA along with her brother Jan Silva. Nothing on file.     Yovana from [facility] states pt can return with + Covid test.

## 2022-09-19 NOTE — PLAN OF CARE
Problem: Respiratory - Adult  Goal: Achieves optimal ventilation and oxygenation  9/19/2022 0924 by Yeimi Smart [facility]  Outcome: Progressing       Unable to get agreement for goals because no family is present and patient cannot respond.

## 2022-09-19 NOTE — PLAN OF CARE
Problem: Discharge Planning  Goal: Discharge to home or other facility with appropriate resources  9/19/2022 0804 by Milan Gagnon RN  Outcome: Progressing  Note: Communication with treatment team and patient about discharge plan and appropriate resources. Problem: Safety - Adult  Goal: Free from fall injury  9/19/2022 0804 by Milan Gagnon RN  Outcome: Progressing  Note: The patient has been assessed for falls and the room room has been assessed for hazards to safety. Hand hygeine has been performed upon additional hazards to their safety. Will continue to monitor and assess throughout my hourly rounding. Problem: Skin/Tissue Integrity  Goal: Absence of new skin breakdown  Description: 1. Monitor for areas of redness and/or skin breakdown  2. Assess vascular access sites hourly  3. Every 4-6 hours minimum:  Change oxygen saturation probe site  4. Every 4-6 hours:  If on nasal continuous positive airway pressure, respiratory therapy assess nares and determine need for appliance change or resting period. 9/19/2022 0804 by Milan Gagnon RN  Outcome: Progressing  Note: No new signs of injury or skin breakdown noted. Hourly rounding performed. Skin integrity, elimination, circulation assessed assessed hourly. Will continue to monitor. Problem: Pain  Goal: Verbalizes/displays adequate comfort level or baseline comfort level  9/19/2022 0804 by Milan Gagnon RN  Outcome: Progressing  Note: Patient displays improved well-being such as baseline levels for pulse, BP, respirations and relaxed muscle tone or body posture. Monitor using 0-10 pain scale.

## 2022-09-19 NOTE — PROGRESS NOTES
[address] ICU 4D  Clinical Swallow Evaluation      SLP Individual Minutes  Time In: 1746  Time Out: 7793  Minutes: 12  Timed Code Treatment Minutes: 0 Minutes       Date: 2022  Patient Name: Janis Villeda      CSN: [id number]   : 1933  (80 y.o.)  Gender: male   Referring Physician:  Tito Mireles MD    Diagnosis: Sepsis ([address]    History of Present Illness/Injury: Per chart review; Janis Villeda  is a 81 y/o male who was admitted to [facility] with the above medical dx. Kaylene Last is a 51-year-old  male admitted to [facility] ICU on 2022 with chief complaint of shortness of breath and hypoxia. Patient has past medical history significant for former smoker, ASHD s/p CABG, AICD, CAD LHC- Dr. Omer Schneider did a cardiac catheterization which did show some CAD, not really amenable to intervention, ECHO  LVEF 40-45%, global hypokinesis of the left ventricle, bioprosthetic aortic valve, myxomatotic degeneration of the mitral valve, mild MR, Persistent atrial fibrillation, GERD, dyslipidemia, kaposi's sarcoma. Brit Thomson presented to [place] [facility] ER via EMS with chief complaint of shortness of breath and increased confusion. EMS states patient's oxygen saturation was 70% at [facility]. Patient was placed on 15 L nonrebreather mask SaO2 was up to 97%. Tmax 102. SBP . Oxygen was bridged to BIPAP. Lactic Acid 2.0-1.5. RIJ CVC was placed and Levophed gtt was started. COVID testing was positive. Patient had been living independently up until about a week ago when he had a fall at home and was discharged to a nursing home. This morning he was seen by his family at the nursing home and was not quite himself. Hypoxia was reported 24 hours prior to admission which O2NC was applied at [facility]. AM of admission his oxygen was low. Positive for a loose productive cough for the past week. Positive for rigors and fever AM of admission.   He subsequently got a chest x-ray AM at [facility] which read as CHF, was evaluated by a physician and sent to the emergency department for further evaluation. Positive for confusion and hypotension at time of admission to the ICU.      9/18/2022 is currently on pressors. Discontinue the antihypertensives for now. Procalcitonin 4.72 ferritin 492 lactate dehydrogenase 314 WBC 26.1. Is currently stable  9/19/2022 patient has hemoptysis. INR now 5.72. Plan to transfuse fresh frozen plasma and give vitamin K. Add doxycycline to antibiotics. Change Solu-Medrol to Decadron. Patient is still on Levophed and vasopressin. \"     Patient referred for a CSE to further assess swallow function and determine readiness for a diet initiation. 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 Pt is alert, disoriented, but pleasantly agreeable to complete CSE this afternoon. Per RN Synetta Schirmer, pt has been experiencing increased confusion in the mornings, which was rationale for AM attempt. No family present on this date. OBJECTIVE:    Pain:  No pain reported.     Current Diet: NPO until CSE     Respiratory Status:  Nasal Canula and 5 L/min     Behavioral Observation:  Confused, Limited Direction Following, and dysphonia     CRANIAL NERVE ASSESSMENT   CN V (Trigeminal) Closes and Opens Mandible WFL    Rotary Jaw Movement WFL      CN VII (Facial) Cheeks Hold Food out of Sulci WFL    Opens, Closes/Seals, Protrudes, Retracts Lips Impaired: Bilateral    General Appearance Impaired    Sensation Not Tested      CN X (Vagus - Pharyngeal) Raises Back of Tongue Unable to follow commands       CN XI (Accessory) Lifts Soft Palate WFL      CN XII (Hypoglossal) Elevates Tongue Up and Back Not Tested    Protrusion   Impaired    Lateralizes Tongue WFL    Sensation Not Tested      Other Observations Dentition Edentulous *denture on pt table tray    Vocal Quality Moderate to severe dysphonia     Cough Weak and unproductive. Patient Evaluated Using: Secretions, Ice Chips, Thin Liquids, and Puree    Oral Phase:  Impaired:  Impaired AP Movement, Reduced Bolus Formation, and Impaired Oral Initiation    Pharyngeal Phase: Impaired:  Decreased Hyolaryngeal Elevation, Audible Swallow, and Suspected Decreased Airway Protection    Signs and Symptoms of Laryngeal Penetration/Aspiration: Immediate Cough and Delayed Cough    Impressions: Patient presents with mild oral dysphagia / suspected pharyngeal dysphagia  with inability to fully discern potential presence of pharyngeal phase deficits without formal instrumentation. Completed limited PO trials of thin liquids via ice x2, tsp x2, cup x2, and straw drinks x1, and puree x3. Patient presented with intermittent delayed coughing, weak and unproductive in nature with 1/2 tsp, 1/2 cup drinks, and 1/1 straw drinks. NO overt s/s of airway invasion across 3/3 tsp puree trials. Given medically complex medical course, including; waxing/waning mentation, decreased ability to follow commands to for attempted compensatory swallowing strategies (single drinks), decreased strength and ROM of labial, lingual, and facial muscles, compounded with dysphonia and already compromised pulmonary system; pt is not appropriate to initiate a PO diet. Recommended STRICT NPO x medications CRUSHED in puree, with further instrumental assessment via MBS prior to initiation of a PO diet. *Consideration of FEES if pt is placed back on [facility], however, concerns with pt acceptance and tolerance of chip tip scope.    *no known intubation this admission, unclear pt's vocal baseline and/or rationale for dysphonia. No family present this date. RECOMMENDATIONS/ASSESSMENT:  Instrumental Evaluation: Modified Barium Swallow (MBS)  Diet Recommendations:  NPO x CRUSHED meds in puree   Strategies:  Recommend NPO   Rehabilitation Potential: good  Discharge Recommendations: Continue to Assess Pending Progress    EDUCATION:  Learner: Patient  Education:  Reviewed results and recommendations of this evaluation, Reviewed ST goals and Plan of Care, and Reviewed recommendations for follow-up  Evaluation of Education: Needs further instruction, No evidence of learning, and Family not present    PLAN:  Recommendations pending MBS    PATIENT GOAL:    Did not state. Will further assess during treatment. SHORT TERM GOALS:  Short-term Goals  Timeframe for Short-term Goals: 1-3 days  Goal 1: Patient will complete instrumental assessment (MBS vs FEES) to further assess swallow function prior to to diet initation    LONG TERM GOALS:  No LTM Goals recommended, due to anticipated short ELOS. Vicky Tamez MA., Eddy Potter / MA#.[phone]

## 2022-09-19 NOTE — PLAN OF CARE
Problem: Discharge Planning  Goal: Discharge to home or other facility with appropriate resources  Outcome: Progressing   Unable to transfer at this time. Pt remains on vasopressors on highflow. Problem: Respiratory - Adult  Goal: Achieves optimal ventilation and oxygenation  9/19/2022 0142 by Ondina George RN  Outcome: Progressing   Highflow setting decreased this shift. Problem: Safety - Adult  Goal: Free from fall injury  Outcome: Progressing  Without fall thus far. Falling star in place. Pittman Fall Scale completed. Problem: Skin/Tissue Integrity  Goal: Absence of new skin breakdown  Description: 1. Monitor for areas of redness and/or skin breakdown  2. Assess vascular access sites hourly  3. Every 4-6 hours minimum:  Change oxygen saturation probe site  4. Every 4-6 hours:  If on nasal continuous positive airway pressure, respiratory therapy assess nares and determine need for appliance change or resting period. Outcome: Progressing     Problem: Pain  Goal: Verbalizes/displays adequate comfort level or baseline comfort level  Outcome: Progressing   Denies pain at this time. With attempt to review care plan with patient. Pt is confused and becomes aggressive. Without family at bedside this shift.

## 2022-09-19 NOTE — PLAN OF CARE
Problem: Respiratory - Adult  Goal: Achieves optimal ventilation and oxygenation  9/19/2022 1519 by Sondra Phillips RCP  Outcome: Progressing  Flowsheets (Taken 9/19/2022 1519)  Achieves optimal ventilation and oxygenation: Respiratory therapy support as indicated  Note: Patient has been weaned off NIV and HFNC. Patient tolerating NC at this time. Patient mutually agreed on goals.

## 2022-09-19 NOTE — PROGRESS NOTES
to susceptibility with antibiotics accordingly. HFrEF:  ECHO 2020 LVEF 40-45%, global hypokinesis, bioprosthetic AVR, mild MR  His Entresto and Lasix were continued with parameters to hold because of his hypotension. Repeat ECHO. Echo results pending. Dyslipidemia currently on home medication atorvastatin tablet 20mg. Continue    Persistent Atrial Fibrillation:  [id number], on Coumadin. INR is supra therapeutic at 3.34. Coumadin to be dosed per pharmacy with aim to get the coumadin to therapeutic levels, monitor on telemetry daily. Dementia continue home medication Aricept . Measures to limit sun downing in place. INITIAL H AND P AND ICU COURSE:  Nel Calderon is a 71-year-old  male admitted to [facility] ICU on 8/17/2022 with chief complaint of shortness of breath and hypoxia. Patient has past medical history significant for former smoker, ASHD s/p CABG, AICD, CAD LHC-2016 Dr. Sylvia Mcdonald did a cardiac catheterization which did show some CAD, not really amenable to intervention, ECHO 2020 LVEF 40-45%, global hypokinesis of the left ventricle, bioprosthetic aortic valve, myxomatotic degeneration of the mitral valve, mild MR, Persistent atrial fibrillation, GERD, dyslipidemia, kaposi's sarcoma. Nel Calderon presented to [place] [facility] [address] ER via EMS with chief complaint of shortness of breath and increased confusion. EMS states patient's oxygen saturation was 70% at [facility]. Patient was placed on 15 L nonrebreather mask SaO2 was up to 97%. Tmax 102. SBP . Oxygen was bridged to BIPAP. Lactic Acid 2.0-1.5. RIJ CVC was placed and Levophed gtt was started. COVID testing was positive. Patient had been living independently up until about a week ago when he had a fall at home and was discharged to a nursing home. This morning he was seen by his family at the nursing home and was not quite himself. Hypoxia was reported 24 hours prior to admission which O2NC was applied at [facility].  AM of admission his oxygen was low. Positive for a loose productive cough for the past week. Positive for rigors and fever AM of admission. He subsequently got a chest x-ray AM at [facility] which read as CHF, was evaluated by a physician and sent to the emergency department for further evaluation. Positive for confusion and hypotension at time of admission to the ICU.      2022 is currently on pressors. Discontinue the antihypertensives for now. Procalcitonin 4.72 ferritin 492 lactate dehydrogenase 314 WBC 26.1. Is currently stable  2022 patient has hemoptysis. INR now 5.72. Plan to transfuse fresh frozen plasma and give vitamin K. Add doxycycline to antibiotics. Change Solu-Medrol to Decadron. Patient is still on Levophed and vasopressin. Past Medical History: Aortic stenosis, coronary artery disease, GERD, hyperlipidemia, Kaposi's sarcoma, persistent atrial fibrillation, sick sinus syndrome. Family History:   Father is . Mother is . Family history includes heart disease in mother and father. Social History:   He is . He has 8 children. Has a pack year of 15 years. He quit smoking in 2008.      ROS  limited because of BiPAP use      Scheduled Meds:   alteplase  1 mg IntraCATHeter Once    cefepime  1,000 mg IntraVENous Q8H    allopurinol  100 mg Oral Daily    aspirin  81 mg Oral Daily    atorvastatin  20 mg Oral Nightly    [Held by provider] carvedilol  6.25 mg Oral BID    digoxin  250 mcg Oral Daily    donepezil  5 mg Oral Nightly    furosemide  40 mg Oral Daily    LORazepam  1 mg Oral Nightly    melatonin  3 mg Oral Nightly    multivitamin  1 tablet Oral BID    [Held by provider] sacubitril-valsartan  1 tablet Oral BID    methylPREDNISolone  40 mg IntraVENous Q12H    warfarin placeholder: dosing by pharmacy   Other RX Placeholder     Continuous Infusions:   norepinephrine 2 mcg/min (22)    vasopressin (Septic Shock) infusion 0.04 Units/min (22) PHYSICAL EXAMINATION:  T: 99.7. P: 75. RR: 28. B/P: 106/52. FiO2: 35. O2 Sat: 95.  I/O: 188.1/400  Body mass index is 25.9 kg/m². GCS:   14  General:   Patient is calm, not in any obvious distress, looking stated age. HEENT:  normocephalic and atraumatic. No scleral icterus. PERR  Neck: supple. No Thyromegaly. Lungs: Crackles on the right side and reduced air entry on the left side. No retractions  Cardiac: RRR. No JVD. Abdomen: soft. Nontender. Extremities:  No clubbing, cyanosis, or edema x 4. Vasculature: capillary refill < 3 seconds. Palpable dorsalis pedis pulses. Skin:  warm and dry. Psych:  Alert and oriented x3. Affect appropriate  Lymph:  No supraclavicular adenopathy. Neurologic:  No focal deficit. No seizures. Data: (All radiographs, tracings, PFTs, and imaging are personally viewed and interpreted unless otherwise noted). Sodium 140 potassium 4.1 chloride 102 bicarb 29 BUN 31 creatinine 0.9 anion gap 9.0 I calcium 1.08 EGFR 79 CRP 14.1 glucose 163 digoxin level 1.8. WBC 16.9 hemoglobin 9.5 platelets 312  Telemetry shows normal sinus rhythm. On 9/17/2022 proBNP 6799.0, troponin 0.016. Mixed venous pH 7.35 bicarbs 28 PO2 44 PCO2 50  Chest x-ray done on 9/19/2022 demonstrated stable left and moderate improvement in right lung consolidation. Seen with multidisciplinary ICU team yes. Meets Continued ICU Level Care Criteria:    [x] Yes   [] No - Transfer Planned to listed location:  [] HOSPITALIST CONTACTED-      Case and plan discussed with Dr. Ben Figueroa. Electronically signed by DO Annita [address]   Patient seen by me including key components of medical care. Case discussed with resident physician. On pressors. High flow O2. Adding doxycycline to cefepime. Italicized font, if present,  represents changes to the note made by me. CC time 35 minutes. Time was discontiguous. Time does not include procedure.  Time does include my direct assessment of the patient and coordination of care. Time represents more than 50% of the time involved with patient care by the [address] [place] [address] team.  Electronically signed by Manuel Hayes.  Lexus Mariano MD.

## 2022-09-19 NOTE — CARE COORDINATION
9/19/22, 8:46 AM EDT  DISCHARGE PLANNING EVALUATION:    Jena Current       Admitted: 9/17/2022/ [place] day: 2   Location: [facility] Reason for admit: Hypoxia [R09.02]  Sepsis ([facility] [A41.9]  Pneumonia due to infectious organism, unspecified laterality, unspecified part of lung [J18.9]  COVID-19 [U07.1]   PMH:  has a past medical history of Aortic stenosis, CAD (coronary artery disease), GERD (gastroesophageal reflux disease), Hyperlipidemia, Kaposi's sarcoma ([facility] Medtronic single ICD, Persistent atrial fibrillation ([facility] and SSS (sick sinus syndrome) ([facility] Procedure:   9/17 CXR: Moderate Noé. Permanent pacemaker/February. 2 prosthetic heart valves. Metallic sternotomy sutures. Prominent pleural plaque along right hemidiaphragm and another along the lateral aspect of the left lung base; Moderate pneumonia/pulmonary edema both mid and lower lung fields, most severe left lung base  9/17 CT Head: No acute findings  9/17 CVC RIJ  9/19 CXR: Stable left and moderate improvement right lung consolidations    Barriers to Discharge: Presented to ED with c/o SOB, hypoxia, and increased confusion. Sats 70% at SNF. Placed on NRB mask and sats improved. Tmx 102. SBP . Transitioned to bipap. CVC placed and started on levophed drip. +COVID 19. Admitted to ICU. Echo ordered. Now on HFNC, 25L, 35% FIO2, sats 89%. Tmax 100. Vpaced. Oriented to self only. Follows commands. SLP/PT. Blood culture x1 +gram positive cocci in clusters. Limited Code - no x4. +COVID 19. Telemetry, CVC, n/v checks, anant, SCDs. Levo @ 2 mcg/min, vasopressin @ 0.04 units/min, allopurinol, asa, lipitor, IV cefepime, digoxin, aricept, po lasix daily, po ativan HS, IV solumedrol 40 mg Q12H, coumadin - pharmacy dosing. Urine sent for culture. Rapid flu was negative.  Procal 0.42 - then 4.72, CRP 9.12, , pro-bnp 6799, trop 0.016, alb 3.3, alk phos 149, alt 31 -now 78, ast 54 - now 127, bili 1.6 -now 2.3, direct bili 0.7, wbc 10.7 - 26.1 - now 16.9, hgb 10.4 -now 9.5, ferritin 492, INR 3.34. PCP: Marco Jarvis MD  Readmission Risk Score: 19.9%    Patient Goals/Plan/Treatment Preferences: [facility]. No precert required. SW on case. Transportation/Food Security/Housekeeping Addressed:  No issues identified.

## 2022-09-19 NOTE — PLAN OF CARE
Problem: Respiratory - Adult  Goal: Achieves optimal ventilation and oxygenation  9/19/2022 0615 by Hank Marx RCP  Outcome: Progressing     Heated high flow is being weaned as tolerated

## 2022-09-20 NOTE — PROGRESS NOTES
Hospitalist Progress Note    Patient:  César Terrell      Unit/Bed:4K-09/009-A    YOB: 1933    MRN: [id number]       Acct: [de-identified]     PCP: Ondina Uriarte MD    Date of Admission: 9/17/2022    Assessment/Plan:    Acute Hypoxic Respiratory Failure: Multifactorial - COVID PNA, Aspiration PNA, and CHF Exacerbation. He is on RA at baseline. Continue to wean as tolerated. Encourage acapella/IS if able. Severe Pneumonia: Concomitant COVID-19 PNA and Aspiration PNA due to patient's severe dysphagia. S/p Vancomycin x1. Continue Cefepime and Doxycycline. Severe Dysphagia: Failed MBS. Recommended NPO at this time. Discussed PEG with family as below - they are currently in discussion with entire family before making the decision. Severe Pulmonary Hypertension: RVSP 60mmHg per TTE on 9/19/22. Acute on Chronic HFrEF 35%, improving: s/p AICD placement. Entresto and Lasix held due to hypotension. Monitor I/O's, daily weights. S/p CABG: Continue statin, ASA  S/p Pacemaker: Due to Sick Sinus Syndrome. Noted. Persistent Atrial Fibrillation: Coumadin held currently due to supratherapeutic INR, which has improved. Continue Digoxin 250 mcg. For now we will continue to hold Coumadin until INR is subtherapeutic and anticipate starting heparin gtt temporarily if family decides to move forward with PEG placement. Suspected UTI: Initial U/A indicative of UTI, but difficult to determine if patient is symptomatic. He has some noted clots in his Booker bag. For now will continue antibiotics as above. HLD: Continue statin  Dementia: Continue Aricept.    Hx Kaposi Sarcoma: Noted  Septic Shock, resolved    Expected discharge date:  TBD    Disposition:    [] Home       [] TCU       [] Rehab       [] Psych       [] SNF       [] [place]       [] Other-    Chief Complaint: SOB    Hospital Course:   Patient is a 80year old frail elderly  Male with PMH s/p CABG, Persistent Atrial Fibrillation, SSS s/p Pacemaker placement, s/p AVR, GERD, HLD, AICD, and Kaposi's Sarcoma. He presented to [facility] on 9/18/22 with chief complaint of shortness of breath, hypoxia, and increased confusion. EMS stated that patient's Spo2 was 70% at [facility]. He was then placed on 15L non-rebreather mask with improvement of SpO2 to 97%. Tmax 102. SBP . He was then placed on BiPAP. RIJ CVC was placed and Levophed gtt started. COVID testing was positive. Up until about a week ago patient was living independently with caretakers coming by at least once a day to assist with meals and cleaning. Due to a fall about a week ago he was discharged to a nursing home. On the morning of 9/17 patient was seen by his family at the nursing home and he was not quite himself. Hypoxia was reported 24 hours prior to admission which O2 NC was placed at [facility]. The morning of admission his oxygen was low and he was positive for loose productive cough for the past week. He had fevers and rigors on the morning of admission. He subsequently got a CXR at [facility] which read as CHF, was evaluated by a physician and sent to the ED for further evaluation. He was admitted to the ICU for vasopressor support. Due to concern for aspiration pneumonia, he was given Vancomycin x1 and started on Cefepime on 9/18 and then Doxycycline on 9/19 for broad coverage. On 9/19 patient developed hemoptysis. He is on chronic coumadin due to his atrial fibrillation and his INR was 5.72 at that time. He was given FFP and Vitamin K. His INR has decreased to therapeutic levels on 9/20. He was able to be slowly titrated off vasopressors on 9/20 and was thus able to be transferred to stepdown. Subjective (past 24 hours):   Patient is being transferred out of the critical care unit to stepdown. Handoff received from ICU provider.      Notified by speech therapy that patient's modified barium swallow showed significant aspiration risk and he is overall has very poor prognostic recoverability of his swallowing. Patient's son, Lauren Bryan, was at bedside and I discussed above findings with him regarding patient's dysphagia, dementia, significant cardiac history, and overall poor prognosis. We discussed the options of PEG tube placement, it's risks and benefits, and also the option of palliative care and hospice and what each decision would entail. Lauren Bryan states that he knows his father would not want a PEG tube placed, but he would have to discuss these things with his siblings as they all have a say on the decision. Patient himself is currently confused regarding his situation and does not know where he is. He has audible congestion and has a productive cough. He is pulling at his Booker and blanket, but easily redirectable. He does not appear to be in distress at this time. Interim Update: Approximately 5pm patient's daughter, Humphrey Chahal, was at bedside and requested further information regarding steps moving forward. Jacklyn Dietrich, son, is present as well. We discussed the patient's overall current medical condition, his medical history, and overall poor prognosis. We discussed the options of PEG tube placement versus hospice/comfort care and what each would involve. Coreen Badillo and Lauren Bryan were able to reiterate the information and understand the overall prognosis. They feel that ultimately they would likely move forward with hospice because they know he would not do well with a PEG tube, but they want to discuss with Jesicaquirino Deepti and the rest of the family before moving forward with the decision. I discussed that a different physician will be taking over the care tomorrow and that s/he would be able to assist them with the next steps. They have no further questions at this time. ROS (12 point review of systems completed. Pertinent positives noted. Otherwise ROS is negative).     Medications:  Reviewed    Infusion Medications   Scheduled Medications    alteplase  1 mg IntraCATHeter Once    calcium-cholecalciferol  1 tablet Oral BID    doxycycline (VIBRAMYCIN) IV  100 mg IntraVENous BID    dexamethasone  6 mg IntraVENous Q24H    cefepime  1,000 mg IntraVENous Q8H    allopurinol  100 mg Oral Daily    aspirin  81 mg Oral Daily    atorvastatin  20 mg Oral Nightly    [Held by provider] carvedilol  6.25 mg Oral BID    digoxin  250 mcg Oral Daily    donepezil  5 mg Oral Nightly    furosemide  40 mg Oral Daily    LORazepam  1 mg Oral Nightly    melatonin  3 mg Oral Nightly    multivitamin  1 tablet Oral BID    [Held by provider] sacubitril-valsartan  1 tablet Oral BID     PRN Meds: albuterol, polyethylene glycol, acetaminophen **OR** acetaminophen, ondansetron **OR** ondansetron, guaiFENesin-dextromethorphan      Intake/Output Summary (Last 24 hours) at 9/20/2022 1443  Last data filed at 9/20/2022 0400  Gross per 24 hour   Intake 337.65 ml   Output 1350 ml   Net -1012.35 ml     Diet:  Diet NPO    Exam:  BP (!) 132/92   Pulse 74   Temp 97.1 °F (36.2 °C) (Axillary)   Resp 20   Ht 5' 7\" (1.702 m)   Wt 165 lb 5.5 oz (75 kg)   SpO2 94%   BMI 25.90 kg/m²     General appearance: Alert / elderly, ill-appearing male. Cooperative. NAD. HEENT:  Normocephalic / atraumatic. PERRL. EOM intact. Conjunctivae appear normal. Dry oral mucosa. Neck: Supple. No JVD. Respiratory: Mildly increased respiratory effort on nasal cannula. Mild bibasilar rhonchi. Cardiovascular: Irregular rhythm, regular rate. Normal S1/S2. No murmurs / rubs / gallops. Abdomen: Soft / non-tender / non-distended. BS present. Musculoskeletal: No cyanosis or edema. Skin: Warm / dry. Normal turgor. Neurologic: A/O x 1 (Self). Speech normal. No obvious focal neurologic deficits. Capillary refill: Brisk bilaterally. Peripheral pulses: +2 bilaterally.     Labs:   Recent Labs     09/18/22  0446 09/19/22  0120 09/20/22  0504   WBC 26.1* 16.9* 14.2*   HGB 10.8* 9.5* 9.9*   HCT 33.4* 28.8* 30.0*    194 197     Recent Labs     09/18/22  1110 09/19/22  0120 09/20/22  0504    140 141   K 4.1 4.1 3.8    102 101   CO2 25 29 29   BUN 26* 31* 44*   CREATININE 0.8 0.9 0.9   CALCIUM 8.3* 8.4* 9.0     Recent Labs     09/17/22  1531 09/18/22  0446   AST 54* 127*   ALT 31 78*   BILIDIR 0.7*  --    BILITOT 1.6* 2.3*   ALKPHOS 148* 149*     Recent Labs     09/18/22  0446 09/19/22  0810 09/20/22  0504   INR 3.34* 5.72* 3.09*     No results for input(s): CKTOTAL, TROPONINI in the last 72 hours. Microbiology:      Urinalysis:      Lab Results   Component Value Date/Time    NITRU NEGATIVE 09/17/2022 03:00 PM    WBCUA 10-15 09/17/2022 03:00 PM    BACTERIA MODERATE 09/17/2022 03:00 PM    RBCUA 10-15 09/17/2022 03:00 PM    BLOODU NEGATIVE 09/17/2022 03:00 PM    SPECGRAV 1.019 12/08/2015 08:17 AM    GLUCOSEU NEGATIVE 09/17/2022 03:00 PM       Radiology:  [facility] MODIFIED BARIUM SWALLOW W VIDEO   Final Result   1. Laryngeal penetration and aspiration of thin barium. 2. Additional recommendations from the speech therapist will follow. **This report has been created using voice recognition software. It may contain minor errors which are inherent in voice recognition technology. **         Final report electronically signed by Dr. Lalo Ruiz on 9/20/2022 12:05 PM      XR CHEST PORTABLE   Final Result      Interval worsening with near complete opacification of the left    hemithorax. This may be secondary to a large pleural effusion, atelectasis    or pneumonia. Remainder of the examination is grossly unchanged. This document has been electronically signed by: Marianne Hyman MD on    09/20/2022 06:51 AM      XR CHEST PORTABLE   Final Result   Impression:   Stable left and moderate improvement right lung consolidations. This document has been electronically signed by:  Krish Whitlock MD on    09/19/2022 06:17 AM      XR CHEST PORTABLE   Final Result   Impression:   Moderate cardiogenic pulmonary edema is favored over multifocal infection. This document has been electronically signed by: Todd Haynes. Dora Darby MD    on 09/18/2022 05:42 AM      CT Head WO Contrast   Final Result   No acute intracranial process. .          **This report has been created using voice recognition software. It may contain minor errors which are inherent in voice recognition technology. **      Final report electronically signed by Dr. Daniele Isbell on 9/17/2022 6:50 PM      XR CHEST PORTABLE   Final Result   1. Moderate cardiomegaly. Permanent pacemaker/defibrillator. 2 prosthetic heart valves. Metallic sternotomy sutures. A right jugular line has been inserted with tip in the SVC. Calcified pleural plaques are again seen bilaterally. Cannot exclude a tiny    effusion right side. 2. Moderate interstitial infiltrates throughout both lungs, consistent with interstitial pneumonia/edema. Overall appearance of chest somewhat worse than prior. **This report has been created using voice recognition software. It may contain minor errors which are inherent in voice recognition technology. **      Final report electronically signed by Dr. Daniele Isbell on 9/17/2022 5:18 PM      XR CHEST PORTABLE   Final Result   1. Moderate Noé. Permanent pacemaker/February. 2 prosthetic heart valves. Metallic sternotomy sutures. Prominent pleural plaque along right hemidiaphragm and another along the lateral aspect of the left lung base. 2. Moderate pneumonia/pulmonary edema both mid and lower lung fields, most severe left lung base. **This report has been created using voice recognition software. It may contain minor errors which are inherent in voice recognition technology. **      Final report electronically signed by Dr. Daniele Isbell on 9/17/2022 2:53 PM        DVT prophylaxis: [] Lovenox                                 [x] SCDs                                 [] SQ Heparin                                 [] Encourage ambulation [] Already on Anticoagulation     Code Status: Limited    PT/OT Eval Status: TBD    Tele:   [x] yes             [] no    Electronically signed by Hyacinth Díaz DO on 9/20/2022 at 2:43 PM

## 2022-09-20 NOTE — PROGRESS NOTES
[address] ICU STEPDOWN TELEMETRY 4K  Modified Barium Swallow    SLP Individual Minutes  Time In: 7738  Time Out: 9  Minutes: 10  Timed Code Treatment Minutes: 0 Minutes       Date: 2022  Patient Name: Kirill Guzman      CSN: [id number]   : 1933  (80 y.o.)  Gender: male   Referring Physician:  Emma Ramsey MD  Diagnosis: Hypoxia   Precautions: Fall risk, aspiration precautions, contact/droplet precautions  History of Present Illness/Injury: Patient admitted to [facility] with above med dx; please refer to physician H&P for full details. Per chart review, patient with admission for AMS and hypoxia, severe pneumonia with etiology for either COVID 19 vs aspiration. Pertinent, alternate dx for dementia with ECF placement. Patient has a past medical history of Aortic stenosis, CAD (coronary artery disease), GERD (gastroesophageal reflux disease), Hyperlipidemia, Kaposi's sarcoma ([facility] [address] [employer] single ICD, Persistent atrial fibrillation ([facility] [address] and SSS (sick sinus syndrome) ([facility] [address] Clinical swallow evaluation completed on prior date with recommendations for f/u formal instrumentation via MBS to further evaluate pharyngeal integrity and to optimize dysphagia management POC. Current Diet: NPO    Pain: No pain reported. SUBJECTIVE:  Patient with arrival to fluoro suite in bed positioning, awake with limited awareness to immediate surroundings. Confusion notable, non-sensical and perseverative language throughout assessment. Mentation with significant impact of clinical findings at date. OBJECTIVE:    Respiratory Status:  Nasal Canula; 3 LPM    Behavioral Observation:  Alert, Confused, and Limited Direction Following    PATIENT WAS EVALUATED USING:  Barium: Thin Liquids    ORAL PHASE FLORENTINO SCORE: (Dysphagia outcome and severity scale)  1 = Severe Dysphagia - NPO - Unable to tolerate PO safely - Severe oral loss of bolus or oral residue - Non-oral nutrition    PHARYNGEAL PHASE FLORENTINO SCORE: (Dysphagia outcome and severity scale)  1 = Severe Dysphagia - NPO - Unable to tolerate any PO safely- Severe residue unable to clear - Silent aspiration with two or more consistencies, non-functional cough OR Unable to achieve a swallow    EVIDENCE FOR LARYNGEAL PENETRATION AND/OR ASPIRATION:  Audible aspiration evident with thin bairum     PENETRATION-ASPIRATION SCALE (PAS): Thin Liquids: 7 = Material enters the airway, passes below the vocal folds, and is not ejected from the trachea despite effort    ESOPHAGEAL PHASE:   No significant findings    ATTEMPTED TECHNIQUES:  Small Bolus Size Ineffective    Straw Not Attempted    Cup Ineffective    Large Drinks Not Attempted    Consecutive Drinks Not Attempted    Chin Tuck Not Attempted    Head Turn Not Attempted    Spoon Presentations Ineffective    Volitional Cough Ineffective    Spontaneous Cough Ineffective           DIAGNOSTIC IMPRESSIONS:  Patient presents with profound oropharyngeal dysphagia as evidenced by skilled clinical findings outlined above. Concerns for a pre-morbid dysphagia with an acute exacerbation d/t current medical status consistent for COVID-19. Oral phase highly unorganized, limited-0 attempts to control/containment viscous bolus with immediate transgression to premature pharyngeal entry (level of the valleculae and pyriform regions). Swallow onset delayed, deficits notable for overall hyolaryngeal elevation, anterior excursion/protraction, thyrohyoid approximation, and epiglottic inversion for airway protection. Mod-severe residuals to follow in the valleculae post intake of thin viscous via tsp with limited-0 clearance of stasis s/t reduced pharyngeal shortening and TBR.  Subsequent cup drink trial in attempt to clear pharynx of residuals with transgressions to GROSS, severe audible  tracheal aspiration before/during/after the swallow (high level of retroflow of material during spontaneous cough reflex with retrograde of contrast subsequently aspirated). Inability to execute basic commands to produce ongoing volitional cough to permit further ejection of barium material. Controlled study deferred given severity of clinical findings; RT [place] with completion of subsequent suctioning. Recommendations for strict NPO diet level. Collaboration completed with attending medical providers (NICO Cadean, palliative care and Ayesha Lynn, DO) to assist with further POC development given prognosis is guarded as it r/t dysphagia rehabilitation. *post evaluation, patient without respiratory distress upon leaving room; NICO Melchor notified re: clinical findings and recommendations from the assessment; verbal receptiveness noted     Diet Recommendations:  NPO  Strategies:  Recommend NPO and Oral Care q2h    Rehabilitation Potential: Guarded  Discharge Recommendations: SNF    EDUCATION:  Learner: Patient  Education:  Reviewed results and recommendations of this evaluation, Reviewed signs, symptoms and risks of aspiration, Reviewed ST goals and Plan of Care, and Reviewed recommendations for follow-up  Evaluation of Education: Needs further instruction, No evidence of learning, and Family not present    PLAN:  Skilled SLP intervention on acute care 3-5 x per week or until goals met and/or pt plateaus in function. Specific interventions for next session may include: dysphagia management, family education . PATIENT GOAL:    Did not state. Will further assess during treatment. SHORT TERM GOALS:  Short-term Goals  Timeframe for Short-term Goals: 2 weeks  Goal 1: Patient will consume conservative PO trials of ice chips post completion of oral care in conjunction with trial therapy of pharyngeal exercises to assist with maintenance/promotion of optimal musculature and airway protection.   Goal 2: Staff will exhibit return demonstration for completion of comprehensive oral care procedural analysis to maximize oral integrity and to reduce potential bacteria colonization. Goal 3: Monitor mentation; complete alternate cognitive linguistic evaluation should it become clinically indicated (hx dementia).     LONG TERM GOALS:  No LTG established given short ANILOS      Ahsan Galdamez M.A., [facility]

## 2022-09-20 NOTE — CARE COORDINATION
9/20/22, 12:47 PM EDT    DISCHARGE BARRIERS        Patient transferred to  8. Report given to unit SW,  Viv DANIEL, regarding discharge plan for this patient.

## 2022-09-20 NOTE — PROGRESS NOTES
CRITICAL CARE PROGRESS NOTE      Patient:  Jena Current    Unit/Bed:4D-13/013-A  YOB: 1933  MRN: [id number]   PCP: Obey Harding MD  Date of Admission: 9/17/2022  Chief Complaint:- Altered mental status, Shortness of Breathe and Hypoxia     Assessment and Plan:     Acute hypoxic respiratory failure: etiology COVID vs PNA vs acute on chronic CHF exacerbation. Troponin 0.02, pro-BNP 6799. Patient had failed use of oxygen via nasal cannula and nonrebreather. Chest x-ray on 9/7/2022 demonstrated moderate cardiomegaly. Presented with temperature of 102 respiratory rate of 21 blood pressure 81/41. CXR 9/17/2022 demonstrated Moderate interstitial infiltrates throughout both lungs consistent with interstitial pneumonia or edema. O2 sats improvement was noted after use of BiPAP and this was transitioned to high flow nasal cannula at time of admission to the ICU. Titrate to maintain SaO2>90%. Severe pneumonia  etiology ? aspiration/COVID 19. WBC on 9/17 was 26.1 and Tmax was 102. Cefepime and Vancomycin were given while in the ER. will continue Cefepime. Solumedrol was changed to decadron on 9/19/2022. Repeat Chest Xray daily until discontinued. Swap for nares pending. pneumonia panel is pending. Patient already on antibiotic before pneumonia panel sent. Pro-Adam 4.72. Oxygen saturation was in 70s in the nursing home. Currently on antibiotics. Plan to discontinue vancomycin if nares result is negative for MRSA. Hypotension Resolved: was admitted for mixed shock. Septic versus cardiogenic blood pressure was 81/41 on admission. He was started on Levophed and vasopressin. Blood pressure is stable today 9/20/2022, after being off the pressors for 10 hours. Currently off pressors. Mixed Shock: Resolved: Septic Vs Cardiogenic +SIRS 4/4, with pulmonary infiltrates, with respiratory cultures pending, and recent COVID infection.  Lactic Acid 2.0-1.5, PCT 4.72. cardiogenic from the parameters above. Levophed and Vasopressin needed to maintain MAP>65. Continue pressors. Discontinue antihypertensives for now until blood pressure is stable. Suspected UTI:  Booker was placed while in the ER. Urinalysis demonstrated cloudy dark urine with proteinuria. Culture is unremarkable. Currently on antibiotics. Will change to susceptibility with antibiotics accordingly. HFrEF:  ECHO 2020 LVEF 40-45%, global hypokinesis, bioprosthetic AVR, mild MR  His Entresto and Lasix were continued with parameters to hold because of his hypotension. Repeat ECHO. Echo results pending. Dyslipidemia currently on home medication atorvastatin tablet 20mg. Continue    Persistent Atrial Fibrillation:  [id number]=9, on Coumadin. INR is supra therapeutic at 3.34. Coumadin was held because his INR went up to 5.72 on 9/19. He was given 5mg of vitamin K. Recheck INR in the am is 3.09. continue to monitor INR daily and plan for FFP transfusion if INR trends up. Dementia continue home medication Aricept . Measures to limit sun downing in place. Disposition: Transfer to the floor this am.        INITIAL H AND P AND ICU COURSE:  Jere Luciano is a 72-year-old  male admitted to [facility] C ICU on 8/17/2022 with chief complaint of shortness of breath and hypoxia. Patient has past medical history significant for former smoker, ASHD s/p CABG, AICD, CAD LHC-2016 Dr. Nichole Nesbitt did a cardiac catheterization which did show some CAD, not really amenable to intervention, ECHO 2020 LVEF 40-45%, global hypokinesis of the left ventricle, bioprosthetic aortic valve, myxomatotic degeneration of the mitral valve, mild MR, Persistent atrial fibrillation, GERD, dyslipidemia, kaposi's sarcoma. Jere Luciano presented to [facility] ER via EMS with chief complaint of shortness of breath and increased confusion. EMS states patient's oxygen saturation was 70% at [facility]. Patient was placed on 15 L nonrebreather mask SaO2 was up to 97%.  Tmax 102. SBP . Oxygen was bridged to BIPAP. Lactic Acid 2.0-1.5. RIJ CVC was placed and Levophed gtt was started. COVID testing was positive. Patient had been living independently up until about a week ago when he had a fall at home and was discharged to a nursing home. This morning he was seen by his family at the nursing home and was not quite himself. Hypoxia was reported 24 hours prior to admission which O2NC was applied at [facility]. AM of admission his oxygen was low. Positive for a loose productive cough for the past week. Positive for rigors and fever AM of admission. He subsequently got a chest x-ray AM at [facility] which read as CHF, was evaluated by a physician and sent to the emergency department for further evaluation. Positive for confusion and hypotension at time of admission to the ICU.      2022 is currently on pressors. Discontinue the antihypertensives for now. Procalcitonin 4.72 ferritin 492 lactate dehydrogenase 314 WBC 26.1. Is currently stable    2022 patient has hemoptysis. INR now 5.72. Plan to transfuse fresh frozen plasma and give vitamin K. Add doxycycline to antibiotics. Change Solu-Medrol to Decadron. Patient is still on Levophed and vasopressin. 2022 currently off pressors. Pressors came off last night. Blood pressure is still stable at 13/54. MAP 91. Is on 2L n/c  and sating at 97%. Past Medical History: Aortic stenosis, coronary artery disease, GERD, hyperlipidemia, Kaposi's sarcoma, persistent atrial fibrillation, sick sinus syndrome. Family History:   Father is . Mother is . Family history includes heart disease in mother and father. Social History:  He is . He has 8 children. Has a pack year of 15 years. He quit smoking in 2008.         ROS Denies chest pain, abdominal pain, dizziness, headache, shortness of breathe, cough       Scheduled Meds:   alteplase  1 mg IntraCATHeter Once    calcium-cholecalciferol  1 tablet Oral BID    doxycycline (VIBRAMYCIN) IV  100 mg IntraVENous BID    dexamethasone  6 mg IntraVENous Q24H    cefepime  1,000 mg IntraVENous Q8H    allopurinol  100 mg Oral Daily    aspirin  81 mg Oral Daily    atorvastatin  20 mg Oral Nightly    [Held by provider] carvedilol  6.25 mg Oral BID    digoxin  250 mcg Oral Daily    donepezil  5 mg Oral Nightly    furosemide  40 mg Oral Daily    LORazepam  1 mg Oral Nightly    melatonin  3 mg Oral Nightly    multivitamin  1 tablet Oral BID    [Held by provider] sacubitril-valsartan  1 tablet Oral BID     Continuous Infusions:    PHYSICAL EXAMINATION:  T: 98.6. P: 74. RR: 27. B/P: 131/76. O2 Sat: 97.  I/O: 337.7/300  Body mass index is 25.9 kg/m². GCS:   14  General: He is on nasal cannula oxygen. Ill looking. He is not in any respiratory distress. He is calm and cooperative. HEENT:  normocephalic and atraumatic. No scleral icterus. PERRL  Neck: supple. No Thyromegaly. Lungs: reduced air entry in the lowe lung bases bilaterally. No retractions  Cardiac: diastolic murmur in the 2nd right ICS at the sternal boarder. Diastolic murmur in the cardiac apex. No JVD. Abdomen: soft. Nontender. Extremities:  No clubbing, cyanosis, or edema x 4. Vasculature: capillary refill < 3 seconds. Palpable dorsalis pedis pulses. Skin:  warm and dry. Psych:  Alert and oriented x3. Affect appropriate  Lymph:  No supraclavicular adenopathy. Neurologic:  No focal deficit. No seizures. Data: (All radiographs, tracings, PFTs, and imaging are personally viewed and interpreted unless otherwise noted).    Sodium 141 potassium 3.8 chloride 101 bicarb 21 BUN 44 creatinine 0.9 anion gap 11 ionized calcium 1.16 EGFR was 79 procalcitonin 2.83 CRP 8.79 glucose 103  WBC 14.2 hemoglobin 9.9 platelets 120  Telemetry shows normal sinus rhythm        Seen with multidisciplinary ICU team Yes   Meets Continued ICU Level Care Criteria:    [] Yes   [x] No - Transfer Planned to listed

## 2022-09-20 NOTE — CARE COORDINATION
9/20/22, 11:28 AM EDT    DISCHARGE ON GOING EVALUATION    Victorino Romo day: 3  Location: -13/013-A Reason for admit: Hypoxia [R09.02]  Sepsis ([address] [A41.9]  Pneumonia due to infectious organism, unspecified laterality, unspecified part of lung [J18.9]  COVID-19 [U07.1]   Procedure:   9/17 CXR: Moderate Noé. Permanent pacemaker/February. 2 prosthetic heart valves. Metallic sternotomy sutures. Prominent pleural plaque along right hemidiaphragm and another along the lateral aspect of the left lung base; Moderate pneumonia/pulmonary edema both mid and lower lung fields, most severe left lung base  9/17 CT Head: No acute findings  9/17 CVC RIJ  9/19 ECHO with EF 35%; moderate global hypokinesis of LV  9/20 CXR:   Interval worsening with near complete opacification of the left    hemithorax. This may be secondary to a large pleural effusion, atelectasis    or pneumonia     Barriers to Discharge: Pressors weaned off. Strict NPO with meds crushed in puree. Plan for MBS today. Weaned down to 2L O2 with sats 97%. Tmax 99.7. Vpaced. Oriented to person and place. Follows commands. SLP/PT. Blood culture x1 +staphylococcus - possible contamination. Limited Code - no x4. +COVID 19. Telemetry, CVC, n/v checks, ny, SCDs. Allopurinol, asa, lipitor, IV cefepime, IV decadron daily, digoxin, aricept, IV doxycycline, po lasix daily, po ativan HS. Received 5 mg po vit K x1 yesterday for INR 5.72 - now 3.09. Procal 2.83, CRP 8.79, wbc 14.2, hgb 9.9. PCP: Luisa Jason MD  Readmission Risk Score: 19.4%  Patient Goals/Plan/Treatment Preferences: [facility]. No precert required. SW on case. Pt transferring to 4K09. Handoff report given to [place], 4 RODRIGO.

## 2022-09-20 NOTE — PROGRESS NOTES
Patient transferred to [facility] from ICU. Patient vital signs obtained. Continuous pulse ox and continuous cardiac monitor applied. Two RN skin assessment completed by Shanika Calderon RN and Nany Larry.

## 2022-09-20 NOTE — PROGRESS NOTES
[address] [facility] PHYSICAL THERAPY  EVALUATION  [facility] ICU STEPDOWN TELEMETRY 4K - 1J-20/726-V    Time In: 906  Time Out: 932  Timed Code Treatment Minutes: 8 Minutes  Minutes: 26          Date: 2022  Patient Name: Valencia Dyer,  Gender:  male        MRN: [id number]  : 1933  (80 y.o.)      Referring Practitioner: Dr. Una Mejia  Diagnosis: hypoxia  Additional Pertinent Hx: Patient admitted to [facility] with above med dx; please refer to physician H&P for full details. Per chart review, patient with admission for AMS and hypoxia, severe pneumonia with etiology for either COVID 19 vs aspiration. Pertinent, alternate dx for dementia with ECF placement     Restrictions/Precautions:  Restrictions/Precautions: Fall Risk    Subjective:  Chart Reviewed: Yes  Patient assessed for rehabilitation services?: Yes  Subjective: cooperative, son present and supportive    General:        Hearing: Exceptions to [facility]  Hearing Exceptions: Hard of hearing/hearing concerns       Pain: no c/o pain    Vitals: Oxygen: 2L with O2 sat at 97%  HR 99, /86, pt did have inc HR with activity and RR limiting    Social/Functional History:    Lives With: Alone  Type of Home: Facility  Home Equipment: Walker, rolling                   Ambulation Assistance: Needs assistance  Transfer Assistance: Needs assistance          Additional Comments: [facility] to [facility] after fall, using walker just since fall, lives alone    OBJECTIVE:  Range of Motion:  Bilateral Lower Extremity: WFL    Strength:  Bilateral Lower Extremity: grossly 3- to 3/5, generalized wekaness    Balance:  Static Sitting Balance:  Contact Guard Assistance, inc time  Static Standing Balance: Minimal Assistance, with BUE support, WBOS, tolerated around 10 sec then fatigued and in SOB    Bed Mobility:  Rolling to Right: Moderate Assistance   Supine to Sit: Moderate Assistance  Sit to Supine:  Moderate Assistance   Scooting: Minimal Assistance  HOB up 20 degrees  Transfers:  Sit to Stand: Moderate Assistance  Stand to Sit:Minimal Assistance  Cues for hand placement  Ambulation:  Moderate Assistance  Distance: 6 steps  Surface: Level Tile  Device:Rolling Walker  Gait Deviations: Forward Flexed Posture, Slow Dione, Decreased Step Length Bilaterally, Unsteady Gait, and fatigued quickly        Functional Outcome Measures: Completed  AM-PAC Inpatient Mobility without Stair Climbing Raw Score : 9  AM-PAC Inpatient without Stair Climbing T-Scale Score : 32.44    ASSESSMENT:  Activity Tolerance:  Patient tolerance of  treatment: fair. -      Treatment Initiated: Treatment and education initiated within context of evaluation. Evaluation time included review of current medical information, gathering information related to past medical, social and functional history, completion of standardized testing, formal and informal observation of tasks, assessment of data and development of plan of care and goals. Treatment time included skilled education and facilitation of tasks to increase safety and independence with functional mobility for improved independence and quality of life. Assessment: Body Structures, Functions, Activity Limitations Requiring Skilled Therapeutic Intervention: Decreased functional mobility , Decreased strength, Decreased safe awareness, Decreased cognition, Decreased endurance, Decreased tolerance to work activity, Decreased balance  Assessment: pt very irritated by ny catheter, on O2 with +COVID-19, generalized weakness, dec balance/endurance, dec cognition, inc assist for safe mobility, recommend cont PT to inc pt functional mobility  Therapy Prognosis: Good    Requires PT Follow-Up: Yes    Discharge Recommendations:  Discharge Recommendations: Continue to assess pending progress, Subacute/Skilled Nursing Facility, Patient would benefit from continued therapy after discharge    Patient Education:      .     Patient Education  Education Given To: Patient  Education Provided: Role of Therapy, Plan of Care  Education Method: Verbal  Barriers to Learning: Cognition  Education Outcome: Continued education needed       Equipment Recommendations: Other: cont to assess needs    Plan:  Specific Instructions for Next Treatment: therex and mobility  Plan:  (3-5X C)  Specific Instructions for Next Treatment: therex and mobility    Goals:  Patient goals : feel better  Short Term Goals  Time Frame for Short term goals: by discharge  Short term goal 1: bed mobility with CGA to get in/out of bed  Short term goal 2: transfer with SBA to get in/out of chairs  Short term goal 3: amb >10'x1 with walker and CGA to walk safely to bathroom  Long Term Goals  Time Frame for Long term goals : no LTGs set secondary to short ELOS    Following session, patient left in safe position with all fall risk precautions in place.

## 2022-09-20 NOTE — PROGRESS NOTES
Initial Evaluation          Patient:   Luiz Barahona  YOB: 1933  Age:  80 y.o. Room:  [address] [place] [address]  MRN:  [id number]   Acct: [de-identified]    Date of Admission:  9/17/2022  1:49 PM  Date of Service:  9/20/2022  Completed By:  Laurita Lockwood RN                 Reason for Palliative Care Evaluation:-             [x] Code Status Discussion              [x] Goals of Care              [] Pain/Symptom Management               [] Emotional Support              [] Other:                   Current Issues:-  []  Pain  []  Fatigue  []  Nausea  []  Anxiety  []  Depression  []  Shortness of Breath  []  Constipation  []  Appetite  [x]  Other:severe dysphagia             Advance Directives:-  [] [place] DNR Form  [x] Living Will  [x] Medical POA             Current Code Status:-  [] Full Resuscitation  [] DNR-Comfort Care-Arrest  [] DNR-Comfort Care       [x] Limited Resuscitation             [x] No CPR            [x] No shock            [x] No ET intubation/reintubation            [x] No resuscitative medications            [] Other limitation:              Palliative Performance Status:          [] 60%  Ambulation reduced; Significant disease;Can't do hobbies/housework; intake normal or reduced; occasional assist; LOC full/confusion        [] 50%  Mainly sit/lie; Extensive disease; Can't do any work; Considerable assist; intake normal or reduced; LOC full/confusion        [x] 40%  Mainly in bed; Extensive disease; Mainly assist; intake normal or reduced; LOC full/confusion         [] 30%  Bed Bound; Extensive disease; Total care; intake reduced; LOC full/confusion        [] 20%  Bed Bound; Extensive disease; Total care; intake minimal; Drowsy/coma        [] 10%  Bed Bound; Extensive disease;  Total care; Mouth care only; Drowsy/coma        [] 0  Death        Goals of care evaluation:-        The patient goals of care are to provide comfort care/supportive services/palliation & relieve suffering:  Goals of care discussed with:  [] Patient independently  [] Patient and Family  [] Family or Healthcare DPOA independently  [x] Unable to discuss with patient, family/DPOA not present         Family/Patient Discussion:  Patient is confused. Son is at the bedside with eyes closed-did not awaken. Plan/Follow-Up:  Discussed with Dr. Erika Hay and primary RN, Milo Titus. Dr. Erika Hay expresses a preference to speak with family without presence of palliative care and he will reach out to palliative care if needs arise. Please do not hesitate to call prn.         Electronically signed by Lorie Andino RN on 9/20/2022 at [facility] Office: [phone]

## 2022-09-21 NOTE — PROGRESS NOTES
Call placed to primary POAFSHAN Palma  at [phone]. Now answer, message left. Call placed to alternate POA Hi Abo at [phone]. No answer, message left. Return phone calls by both sons and time set for 1000 on 09.22.2022 for hospice consult. Hospice liaison to get family from room and not present in front of patient. Dr. Adri Hammonds update of the above. Call placed to primary nurse Rajiv WALSH with update of planned meeting.

## 2022-09-21 NOTE — PROGRESS NOTES
and Levophed gtt started. COVID testing was positive. Up until about a week ago patient was living independently with caretakers coming by at least once a day to assist with meals and cleaning. Due to a fall about a week ago he was discharged to a nursing home. On the morning of 9/17 patient was seen by his family at the nursing home and he was not quite himself. Hypoxia was reported 24 hours prior to admission which O2 NC was placed at [facility]. The morning of admission his oxygen was low and he was positive for loose productive cough for the past week. He had fevers and rigors on the morning of admission. He subsequently got a CXR at [facility] which read as CHF, was evaluated by a physician and sent to the ED for further evaluation. He was admitted to the ICU for vasopressor support. Due to concern for aspiration pneumonia, he was given Vancomycin x1 and started on Cefepime on 9/18 and then Doxycycline on 9/19 for broad coverage. On 9/19 patient developed hemoptysis. He is on chronic coumadin due to his atrial fibrillation and his INR was 5.72 at that time. He was given FFP and Vitamin K. His INR has decreased to therapeutic levels on 9/20. He was able to be slowly titrated off vasopressors on 9/20 and was thus able to be transferred to stepdown. Subjective (past 24 hours):   Family had lengthy discussions regarding goals of care and have elected not to pursue PEG placement. They have requested hospice consult and comfort measures. Patient does have some improved mentation today reminiscing with his family at bedside, but mentation still waxes/wanes throughout the day. Past medical history, family history, social history and allergies reviewed again and is unchanged since admission. ROS (12 point review of systems completed. Pertinent positives noted.  Otherwise ROS is negative)     Medications:  Reviewed    Infusion Medications   Scheduled Medications    alteplase  1 mg IntraCATHeter Once Peripheral Pulses: +2 palpable, equal bilaterally     Labs:   Recent Labs     09/19/22  0120 09/20/22  0504   WBC 16.9* 14.2*   HGB 9.5* 9.9*   HCT 28.8* 30.0*    197     Recent Labs     09/19/22  0120 09/20/22  0504 09/21/22  0425    141 141   K 4.1 3.8 3.9    101 104   CO2 29 29 30   BUN 31* 44* 50*   CREATININE 0.9 0.9 0.9   CALCIUM 8.4* 9.0 8.9     No results for input(s): AST, ALT, BILIDIR, BILITOT, ALKPHOS in the last 72 hours. Recent Labs     09/19/22  0810 09/20/22  0504   INR 5.72* 3.09*     No results for input(s): Taty Horn in the last 72 hours. Microbiology:    Blood culture #1:   Lab Results   Component Value Date/Time    BC No growth 24 hours. 09/17/2022 03:31 PM    BC  09/17/2022 03:31 PM     possible contamination; clinical correlation required     BC No growth 24 hours. No growth 48 hours. 09/17/2022 03:31 PM       Blood culture #2:No results found for: Filiberto Wilson    Organism:  Lab Results   Component Value Date/Time    ORG Staphylococcus (coagulase negative) 09/17/2022 03:31 PM       No results found for: LABGRAM    MRSA culture only:No results found for: [facility]    Urine culture:   Lab Results   Component Value Date/Time    LABURIN No growth-preliminary No growth  09/17/2022 08:20 PM       Respiratory culture: No results found for: CULTRESP    Aerobic and Anaerobic :  No results found for: LABAERO  No results found for: LABANAE    Urinalysis:      Lab Results   Component Value Date/Time    NITRU NEGATIVE 09/17/2022 03:00 PM    WBCUA 10-15 09/17/2022 03:00 PM    BACTERIA MODERATE 09/17/2022 03:00 PM    RBCUA 10-15 09/17/2022 03:00 PM    BLOODU NEGATIVE 09/17/2022 03:00 PM    SPECGRAV 1.019 12/08/2015 08:17 AM    GLUCOSEU NEGATIVE 09/17/2022 03:00 PM       Radiology:  [facility] MODIFIED BARIUM SWALLOW W VIDEO   Final Result   1. Laryngeal penetration and aspiration of thin barium. 2. Additional recommendations from the speech therapist will follow.             **This report has been created using voice recognition software. It may contain minor errors which are inherent in voice recognition technology. **         Final report electronically signed by Dr. Jayson Marshall on 9/20/2022 12:05 PM      XR CHEST PORTABLE   Final Result      Interval worsening with near complete opacification of the left    hemithorax. This may be secondary to a large pleural effusion, atelectasis    or pneumonia. Remainder of the examination is grossly unchanged. This document has been electronically signed by: Kaity Hoang MD on    09/20/2022 06:51 AM      XR CHEST PORTABLE   Final Result   Impression:   Stable left and moderate improvement right lung consolidations. This document has been electronically signed by: Bronson Young MD on    09/19/2022 06:17 AM      XR CHEST PORTABLE   Final Result   Impression:   Moderate cardiogenic pulmonary edema is favored over multifocal infection. This document has been electronically signed by: Do Regalado. Yun Roger MD    on 09/18/2022 05:42 AM      CT Head WO Contrast   Final Result   No acute intracranial process. .          **This report has been created using voice recognition software. It may contain minor errors which are inherent in voice recognition technology. **      Final report electronically signed by Dr. Sunil Lobo on 9/17/2022 6:50 PM      XR CHEST PORTABLE   Final Result   1. Moderate cardiomegaly. Permanent pacemaker/defibrillator. 2 prosthetic heart valves. Metallic sternotomy sutures. A right jugular line has been inserted with tip in the SVC. Calcified pleural plaques are again seen bilaterally. Cannot exclude a tiny    effusion right side. 2. Moderate interstitial infiltrates throughout both lungs, consistent with interstitial pneumonia/edema. Overall appearance of chest somewhat worse than prior. **This report has been created using voice recognition software.   It may contain minor errors which are inherent in voice recognition technology. **      Final report electronically signed by Dr. Catalina Del Valle on 9/17/2022 5:18 PM      XR CHEST PORTABLE   Final Result   1. Moderate Noé. Permanent pacemaker/February. 2 prosthetic heart valves. Metallic sternotomy sutures. Prominent pleural plaque along right hemidiaphragm and another along the lateral aspect of the left lung base. 2. Moderate pneumonia/pulmonary edema both mid and lower lung fields, most severe left lung base. **This report has been created using voice recognition software. It may contain minor errors which are inherent in voice recognition technology. **      Final report electronically signed by Dr. Catalina Del Valle on 9/17/2022 2:53 PM        CT Head WO Contrast    Result Date: 9/17/2022  PROCEDURE: NONCONTRAST CT BRAIN CLINICAL INFORMATION: Altered mental status COMPARISON: No prior study TECHNIQUE: Multiple axial 5 mm images of the brain were obtained without administration of intravenous contrast material. ALL CT SCANS AT THIS FACILITY use dose modulation, iterative reconstruction, and/or weight-based dosing when appropriate to reduce radiation dose to as low as reasonably achievable. FINDINGS: VENTRICLES: There is mild enlargement of the ventricles diffusely, consistent with central atrophy. Moderate diffuse cerebral cortical atrophy and cerebellar cortical atrophy are also demonstrated. PARENCHYMA:  No acute infarction, mass lesion, or intracranial hemorrhage is seen. MASTOID PROCESSES: Well aerated. Normal in appearance. Marcella Gip PARANASAL SINUSES/CALVARIUM: Unremarkable     No acute intracranial process. . **This report has been created using voice recognition software. It may contain minor errors which are inherent in voice recognition technology. ** Final report electronically signed by Dr. Catalina Del Valle on 9/17/2022 6:50 PM    XR CHEST PORTABLE    Result Date: 9/18/2022  Chest Radiograph Comparison: 9/17/22 Findings: Right central venous catheter with the tip terminating at the cavoatrial junction. Cardiomegaly status post aortic and mitral valve replacement and left chest wall AICD. Normal mediastinal contours. No pneumothorax. Bilateral central opacity. Calcified pleural plaques. Small bilateral pleural effusions. Normal upper abdomen. No fracture. Impression: Moderate cardiogenic pulmonary edema is favored over multifocal infection. This document has been electronically signed by: Radhames Ford. Saurav Cornell MD on 09/18/2022 05:42 AM    XR CHEST PORTABLE    Result Date: 9/17/2022  PROCEDURE: XR CHEST PORTABLE CLINICAL INFORMATION: Central venous catheter insertion. Covid infection. COMPARISON: Earlier film same date, 1430 hours. TECHNIQUE: A single mobile view of the chest was obtained. 1. Moderate cardiomegaly. Permanent pacemaker/defibrillator. 2 prosthetic heart valves. Metallic sternotomy sutures. A right jugular line has been inserted with tip in the SVC. Calcified pleural plaques are again seen bilaterally. Cannot exclude a tiny effusion right side. 2. Moderate interstitial infiltrates throughout both lungs, consistent with interstitial pneumonia/edema. Overall appearance of chest somewhat worse than prior. **This report has been created using voice recognition software. It may contain minor errors which are inherent in voice recognition technology. ** Final report electronically signed by Dr. Ana Abbott on 9/17/2022 5:18 PM    XR CHEST PORTABLE    Result Date: 9/17/2022  PROCEDURE: XR CHEST PORTABLE CLINICAL INFORMATION: Altered mental status, Shortness of breath COMPARISON: 12/19/2021 TECHNIQUE: A single mobile view of the chest was obtained. 1. Moderate Noé. Permanent pacemaker/February. 2 prosthetic heart valves. Metallic sternotomy sutures. Prominent pleural plaque along right hemidiaphragm and another along the lateral aspect of the left lung base.  2. Moderate pneumonia/pulmonary edema both mid and lower lung fields, most severe left lung base. **This report has been created using voice recognition software. It may contain minor errors which are inherent in voice recognition technology. ** Final report electronically signed by Dr. Trupti Perez on 9/17/2022 2:53 PM      Electronically signed by Anya Hinojosa DO on 9/21/2022 at 2:41 PM

## 2022-09-21 NOTE — PROGRESS NOTES
Pt was in bed as his son was visiting with him. He was in a covid room dealing with sepsis and covid. He was anointed.     09/21/22 1614   Encounter Summary   Encounter Overview/Reason  Initial Encounter   Service Provided For: Patient and family together   Referral/Consult From: [facility]   Last Encounter  09/21/22  (Anointed)   Complexity of Encounter Low   Begin Time 1026   End Time  1031   Total Time Calculated 5 min   Spiritual/Emotional needs   Type Spiritual Support   Rituals, Rites and Sacraments   Type Anointing   Assessment/Intervention/Outcome   Assessment Calm

## 2022-09-21 NOTE — PLAN OF CARE
Problem: Respiratory - Adult  Goal: Achieves optimal ventilation and oxygenation  Outcome: Progressing   Patient tolerating 3L nasal cannula well at this time.  Patient mutually agrees with goal of care

## 2022-09-21 NOTE — PLAN OF CARE
Problem: Respiratory - Adult  Goal: Achieves optimal ventilation and oxygenation  9/21/2022 0209 by Crystal Goodman RN  Outcome: Progressing  Flowsheets (Taken 9/20/2022 2035)  Achieves optimal ventilation and oxygenation:   Assess for changes in respiratory status   Assess for changes in mentation and behavior   Position to facilitate oxygenation and minimize respiratory effort   Respiratory therapy support as indicated   Assess and instruct to report shortness of breath or any respiratory difficulty     Problem: Discharge Planning  Goal: Discharge to home or other facility with appropriate resources  9/21/2022 0209 by Crystal Goodman RN  Outcome: Progressing  Flowsheets (Taken 9/20/2022 2035)  Discharge to home or other facility with appropriate resources:   Identify barriers to discharge with patient and caregiver   Identify discharge learning needs (meds, wound care, etc)     Problem: Respiratory - Adult  Goal: Achieves optimal ventilation and oxygenation  9/21/2022 0209 by Crystal Goodman RN  Outcome: Progressing  Flowsheets (Taken 9/20/2022 2035)  Achieves optimal ventilation and oxygenation:   Assess for changes in respiratory status   Assess for changes in mentation and behavior   Position to facilitate oxygenation and minimize respiratory effort   Respiratory therapy support as indicated   Assess and instruct to report shortness of breath or any respiratory difficulty     Problem: Safety - Adult  Goal: Free from fall injury  9/21/2022 0209 by Crystal Goodman RN  Outcome: Progressing  Flowsheets (Taken 9/21/2022 0204)  Free From Fall Injury: Instruct family/caregiver on patient safety     Problem: Skin/Tissue Integrity  Goal: Absence of new skin breakdown  Description: 1. Monitor for areas of redness and/or skin breakdown  2. Assess vascular access sites hourly  3. Every 4-6 hours minimum:  Change oxygen saturation probe site  4.   Every 4-6 hours:  If on nasal continuous positive airway pressure, respiratory therapy assess nares and determine need for appliance change or resting period.   9/21/2022 0209 by Ciara Baez RN  Outcome: Progressing     Problem: Pain  Goal: Verbalizes/displays adequate comfort level or baseline comfort level  9/21/2022 0209 by Ciara Baez RN  Outcome: Progressing  Flowsheets (Taken 9/21/2022 0209)  Verbalizes/displays adequate comfort level or baseline comfort level:   Encourage patient to monitor pain and request assistance   Implement non-pharmacological measures as appropriate and evaluate response   Consider cultural and social influences on pain and pain management     Problem: Chronic Conditions and Co-morbidities  Goal: Patient's chronic conditions and co-morbidity symptoms are monitored and maintained or improved  9/21/2022 0209 by Ciara Baez RN  Outcome: Progressing  Flowsheets (Taken 9/20/2022 2035)  Care Plan - Patient's Chronic Conditions and Co-Morbidity Symptoms are Monitored and Maintained or Improved:   Monitor and assess patient's chronic conditions and comorbid symptoms for stability, deterioration, or improvement   Collaborate with multidisciplinary team to address chronic and comorbid conditions and prevent exacerbation or deterioration     Problem: Nutrition Deficit:  Goal: Optimize nutritional status  9/21/2022 0209 by Ciara Baez RN  Outcome: Progressing  Flowsheets (Taken 9/21/2022 0209)  Nutrient intake appropriate for improving, restoring, or maintaining nutritional needs:   Assess nutritional status and recommend course of action   Monitor oral intake, labs, and treatment plans     Problem: ABCDS Injury Assessment  Goal: Absence of physical injury  9/21/2022 0209 by Ciara Baez RN  Outcome: Progressing  Flowsheets (Taken 9/21/2022 0209)  Absence of Physical Injury: Implement safety measures based on patient assessment

## 2022-09-21 NOTE — PLAN OF CARE
Problem: Discharge Planning  Goal: Discharge to home or other facility with appropriate resources  9/21/2022 1402 by Lorri Shaw RN  Outcome: Progressing  Flowsheets (Taken 9/21/2022 1402)  Discharge to home or other facility with appropriate resources:   Identify barriers to discharge with patient and caregiver   Arrange for needed discharge resources and transportation as appropriate   Identify discharge learning needs (meds, wound care, etc)     Problem: Respiratory - Adult  Goal: Achieves optimal ventilation and oxygenation  9/21/2022 1402 by Lorri Shaw RN  Outcome: Progressing  Flowsheets (Taken 9/21/2022 1402)  Achieves optimal ventilation and oxygenation:   Assess for changes in respiratory status   Assess for changes in mentation and behavior   Position to facilitate oxygenation and minimize respiratory effort   Oxygen supplementation based on oxygen saturation or arterial blood gases   Encourage broncho-pulmonary hygiene including cough, deep breathe, incentive spirometry   Assess the need for suctioning and aspirate as needed   Assess and instruct to report shortness of breath or any respiratory difficulty   Respiratory therapy support as indicated     Problem: Safety - Adult  Goal: Free from fall injury  9/21/2022 1402 by Lorri Shaw RN  Outcome: Progressing  Flowsheets (Taken 9/21/2022 1402)  Free From Fall Injury: Instruct family/caregiver on patient safety     Problem: Pain  Goal: Verbalizes/displays adequate comfort level or baseline comfort level  9/21/2022 1402 by Lorri Shaw RN  Outcome: Progressing  Flowsheets (Taken 9/21/2022 1402)  Verbalizes/displays adequate comfort level or baseline comfort level:   Encourage patient to monitor pain and request assistance   Assess pain using appropriate pain scale   Implement non-pharmacological measures as appropriate and evaluate response     Problem: Chronic Conditions and Co-morbidities  Goal: Patient's chronic conditions and co-morbidity symptoms are monitored and maintained or improved  9/21/2022 1402 by Tripp Witt RN  Outcome: Progressing  Flowsheets (Taken 9/21/2022 1402)  Care Plan - Patient's Chronic Conditions and Co-Morbidity Symptoms are Monitored and Maintained or Improved:   Monitor and assess patient's chronic conditions and comorbid symptoms for stability, deterioration, or improvement   Collaborate with multidisciplinary team to address chronic and comorbid conditions and prevent exacerbation or deterioration     Problem: Nutrition Deficit:  Goal: Optimize nutritional status  9/21/2022 1402 by Tripp Witt RN  Outcome: Progressing  Flowsheets (Taken 9/21/2022 1402)  Nutrient intake appropriate for improving, restoring, or maintaining nutritional needs:   Monitor oral intake, labs, and treatment plans   Assess nutritional status and recommend course of action     Problem: ABCDS Injury Assessment  Goal: Absence of physical injury  9/21/2022 1402 by Tripp Witt RN  Outcome: Progressing

## 2022-09-21 NOTE — CARE COORDINATION
Collaborative Discharge Planning    Nirmala Ward  :  1933  MRN:  [id number]    ADMIT DATE:  2022      Discharge Planning Discharge Planning  Patient expects to be discharged to[de-identified] Skilled nursing facility  Loni Adamson Board Notes /Social Work Whiteboard Notes  /Social Work Whiteboard:  SW: Return to [email or web address], NO precert required. + Covid on admission.     Discharge Plan SNF  Plans return [email or web address] (lived alone prior), daughter Elisabeth Betancourt and son Edwardo [facility]; has walker    Discharge Milestones and Delays: Clinical status    From ICU (pressors there)  Covid/Aspiration PNA/CHF/Pulmonary HTN/A-fib/Sepsis    History: AVR, AICD, Dementia, EF 35%    Elevated WBC  Oxygen 3L, IV AB    Family does not want PEG per report    Hospice following; await their recommendations      SIGNED:  Rona Philippe RN   2022, 1:57 PM

## 2022-09-21 NOTE — DISCHARGE INSTR - COC
Continuity of Care Form    Patient Name: Stephie Brantley   :  1933  MRN:  [id number]    Admit date:  2022  Discharge date:  2022    Code Status Order: DNR - CC   Advance Directives:     Admitting Physician:  Isidra Smith MD  PCP: Kirk Rm MD    Discharging Nurse: [facility] Unit/Room#: [place] [address]  Discharging Unit Phone Number: [phone]    Emergency Contact:   Extended Emergency Contact Information  Primary Emergency Contact: Geremias Posey [address] Phone: [phone]  Relation: Child  Secondary Emergency Contact: Kacie Kwok [address] Phone: [phone]  Relation: Child    Past Surgical History:  Past Surgical History:   Procedure Laterality Date    COLONOSCOPY  2010    Dr Kaylene Fitzgerald GRAFT      [facility]    EYE SURGERY      PACEMAKER INSERTION         Immunization History:   Immunization History   Administered Date(s) Administered    Influenza Virus Vaccine 2011, 2012, 2013, 10/02/2014, 10/06/2015    Influenza Whole 10/01/2008    Influenza, AFLURIA (age 1 yrs+), FLUZONE, (age 10 mo+), MDV, 0.5mL 10/06/2016, 10/05/2017    Influenza, FLUAD, (age 72 y+), Adjuvanted, 0.5mL 2020, 10/20/2021    Influenza, FLUARIX, FLULAVAL, FLUZONE (age 10 mo+) AND AFLURIA, (age 1 y+), PF, 0.5mL 10/17/2019    Influenza, Quadv, 6 mo and older, IM, PF (Flulaval, Fluarix) 10/11/2018    Pneumococcal Conjugate 13-valent (Ftlxirn39) 10/06/2015    Pneumococcal Conjugate 7-valent (Prevnar7) 2005    Pneumococcal Polysaccharide ([id number]) 2017       Active Problems:  Patient Active Problem List   Diagnosis Code    CAD (coronary artery disease) I25.10    Aortic stenosis I35.0    SSS (sick sinus syndrome) (HCC) I49.5    Hyperlipidemia E78.5    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    [employer] single ICD Z95.810    GERD (gastroesophageal reflux disease) K21.9    Hyperuricemia E79.0    HTN (hypertension) I10    S/P MVR (mitral valve repair), Physio ring, 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.890    Persistent atrial fibrillation (HCC) I48.19    Pacemaker Z95.0    Dysphagia R13.10    Hoarseness R49.0    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    S/P AVR (aortic valve replacement) Z95.2    Anticoagulated on Coumadin Z79.01    Mild cognitive impairment G31.84    Chronic systolic CHF (congestive heart failure) (HCC) I50.22    ICD (implantable cardioverter-defibrillator) battery depletion Z45.02    Long term (current) use of anticoagulants Z79.01    Encounter for therapeutic drug monitoring Z51.81    Hemoptysis R04.2    Dizziness R42    Sepsis ([address] A41.9    COVID-19 U07.1    Acute respiratory failure with hypoxia ([address] J96.01    Pneumonia due to infectious organism J18.9       Isolation/Infection:   Isolation            Droplet Plus          Patient Infection Status       Infection Onset Added Last Indicated Last Indicated By Review Planned Expiration Resolved Resolved By    COVID-19 09/17/22 09/17/22 09/17/22 COVID-19, Rapid 10/07/22 10/07/22      hypoxic    Resolved    COVID-19 (Rule Out) 09/17/22 09/17/22 09/17/22 COVID-19, Rapid (Ordered)   09/18/22 Rule-Out Test Resulted    COVID-19 (Rule Out) 12/19/21 12/19/21 12/19/21 COVID-19, Rapid (Ordered)   12/19/21 Rule-Out Test Resulted            Nurse Assessment:  Last Vital Signs: BP (!) 148/77   Pulse 75   Temp 98.5 °F (36.9 °C) (Oral)   Resp 20   Ht 5' 7\" (1.702 m)   Wt 167 lb 12.8 oz (76.1 kg)   SpO2 100%   BMI 26.28 kg/m²     Last documented pain score (0-10 scale): Pain Level: 2  Last Weight:   Wt Readings from Last 1 Encounters:   09/21/22 167 lb 12.8 oz (76.1 kg)     Mental Status:  disoriented    IV Access:  - None    Nursing Mobility/ADLs:  Walking   Bed Rest  Transfer  Dependent  Bathing  Dependent  Dressing  Dependent  Toileting  Dependent  Feeding  Dependent  Med Admin  Dependent  Med Delivery   NPO    Wound Care Documentation and Therapy:          Elimination:  Continence: Bowel: No  Bladder: Booker - bladder spasms  Urinary Catheter: Indication for Use of Catheter: Hospice/comfort/palliateive care   Colostomy/Ileostomy/Ileal Conduit: No       Date of Last BM: ***    Intake/Output Summary (Last 24 hours) at 9/21/2022 1154  Last data filed at 9/21/2022 0410  Gross per 24 hour   Intake 503.06 ml   Output 645 ml   Net -141.94 ml     I/O last 3 completed shifts: In: 840.7 [I.V.:100.4; IV Piggyback:740.3]  Out: 1545 [Urine:1545]    Safety Concerns: At Risk for Falls and Aspiration Risk    Impairments/Disabilities:      Speech    Nutrition Therapy:  Current Nutrition Therapy:   - NPO    Routes of Feeding: None  Liquids: No Liquids  Daily Fluid Restriction: no  Last Modified Barium Swallow with Video (Video Swallowing Test): done on 09/20    Treatments at the Time of Hospital Discharge:   Respiratory Treatments:   Oxygen Therapy:  is on oxygen at 3 L/min per nasal cannula. Ventilator:    - No ventilator support    Rehab Therapies:   Weight Bearing Status/Restrictions: No weight bearing restrictions  Other Medical Equipment (for information only, NOT a DME order): Other Treatments:     Patient's personal belongings (please select all that are sent with patient):      RN SIGNATURE:  Electronically signed by Yury Michel RN on 9/23/22 at 12:12 PM EDT    CASE MANAGEMENT/SOCIAL WORK SECTION    Inpatient Status Date: 9/17/22    Readmission Risk Assessment Score:  Readmission Risk              Risk of Unplanned Readmission:  24           Discharging to Facility/ Agency   Name: [facility]  Address:[address] [place] [address], [address] [facility]    Dialysis Facility (if applicable)   Name:  Address:  Dialysis Schedule:  Phone:  Fax:    / signature: Electronically signed by KHANG Ca on 9/21/22 at 11:55 AM EDT    PHYSICIAN SECTION    Prognosis: Poor    Condition at Discharge:  Comfort care, hospice care    Rehab Potential (if transferring to Rehab): Poor    Recommended Labs or Other Treatments After Discharge: Hospice care, comfort care    Physician Certification: I certify the above information and transfer of Ashish Wetzel  is necessary for the continuing treatment of the diagnosis listed and that he requires [facility] for less 30 days.      Update Admission H&P: No change in H&P    PHYSICIAN SIGNATURE:  Electronically signed by Simba Sharma DO on 9/23/22 at 1:13 PM EDT

## 2022-09-21 NOTE — FLOWSHEET NOTE
Patient did not answer to voice call. Announced turning camera on. Patient lying in bed with eyes closed. Nasal canula in place.  No signs of distress

## 2022-09-22 NOTE — CARE COORDINATION
Client transferred to [place] [address]; hand-off given to [facility], [address] [place] [address]  Electronically signed by Izzy Maxwell RN on 9/22/2022 at 9:03 AM

## 2022-09-22 NOTE — PLAN OF CARE
Problem: Discharge Planning  Goal: Discharge to home or other facility with appropriate resources  9/22/2022 0237 by Amari Varela RN  Outcome: Progressing  Flowsheets (Taken 9/22/2022 0045)  Discharge to home or other facility with appropriate resources: Arrange for needed discharge resources and transportation as appropriate     Problem: Respiratory - Adult  Goal: Achieves optimal ventilation and oxygenation  9/22/2022 0237 by Amari Varela RN  Outcome: Progressing  Flowsheets (Taken 9/22/2022 0045)  Achieves optimal ventilation and oxygenation:   Assess for changes in respiratory status   Assess for changes in mentation and behavior   Position to facilitate oxygenation and minimize respiratory effort   Assess and instruct to report shortness of breath or any respiratory difficulty     Problem: Safety - Adult  Goal: Free from fall injury  9/22/2022 0237 by Amari Varela RN  Outcome: Progressing     Problem: Skin/Tissue Integrity  Goal: Absence of new skin breakdown  Description: 1. Monitor for areas of redness and/or skin breakdown  2. Assess vascular access sites hourly  3. Every 4-6 hours minimum:  Change oxygen saturation probe site  4. Every 4-6 hours:  If on nasal continuous positive airway pressure, respiratory therapy assess nares and determine need for appliance change or resting period.   9/22/2022 0237 by Amari Varela RN  Outcome: Progressing     Problem: Pain  Goal: Verbalizes/displays adequate comfort level or baseline comfort level  9/22/2022 0237 by Amari Varela RN  Outcome: Progressing     Problem: Chronic Conditions and Co-morbidities  Goal: Patient's chronic conditions and co-morbidity symptoms are monitored and maintained or improved  9/22/2022 0237 by Amari Varela RN  Outcome: Progressing  Flowsheets (Taken 9/22/2022 0045)  Care Plan - Patient's Chronic Conditions and Co-Morbidity Symptoms are Monitored and Maintained or Improved: Monitor and assess patient's chronic conditions and comorbid symptoms for stability, deterioration, or improvement     Problem: Nutrition Deficit:  Goal: Optimize nutritional status  9/22/2022 0237 by Caleb Bowers RN  Outcome: Not Progressing     Problem: ABCDS Injury Assessment  Goal: Absence of physical injury  9/22/2022 0237 by Caleb Bowers RN  Outcome: Progressing

## 2022-09-22 NOTE — CARE COORDINATION
9/22/22, 10:58 AM EDT    DISCHARGE PLANNING EVALUATION    Spoke with Hospice nurse, family wanting Hospice for pt. ELLA did call David Mcmillan with [facility], discussed cost/day, they will need to pay 30days up front, Hospice nurse planned to contact family regarding this. Spoke with Hospice, family aware of private pay cost.     David Mcmillan with [facility] aware of pt's COVID positive status. E[facility]A did set up transport for 1pm for 9/23. Hospice nurse aware of this. SW update Yovana at [facility] on this.

## 2022-09-22 NOTE — PROGRESS NOTES
[address] [place] [address]  SPEECH THERAPY MISSED TREATMENT NOTE  [facility] ONC MED 5K      Date: 2022  Patient Name: César Terrell        MRN: [id number]    : 1933  (80 y.o.)    REASON FOR MISSED TREATMENT:  Per chart review, patient transition to comfort care measures. Inappropriate for skilled therapy at this time. ST to sign off. Please reconsult should further needs arise.      Venancio Saeed M.S. Catrachita Shaper 2022

## 2022-09-22 NOTE — PROGRESS NOTES
Pt admitted to  5K16 via bed from 4. Complaints: Shortness of breath. IV site and triple lumen IJ free of s/s of infection or infiltration. Assessment and data collection initiated. Two nurse skin assessment performed by Stephanie Varela RN and El Bridges. Bruising scattered, noting large bruise on right hip annd buttock from fall at home, bilateral buttocks have blanchable redness. Policies and procedures for 5K explained. All questions answered with no further questions at this time. Fall prevention and safety brochure discussed with patient. Bed alarm on. Call light in reach. Oriented to room. Amari Varela, RN, RN 9/22/2022 12:51 AM     Care plan reviewed with patient. Patient verbalize understanding of the plan of care and contribute to goal setting.

## 2022-09-22 NOTE — PROGRESS NOTES
Pt was not responding but his son and daughter were there visiting with him. He will be going home for hospice, family reported.  Words of encouragement   09/22/22 1519   Encounter Summary   Service Provided For: Patient and family together   Referral/Consult From: Nurse   Support System Children   Last Encounter  09/22/22  (NR)   Complexity of Encounter Low   Begin Time 1445   End Time  1453   Total Time Calculated 8 min   Spiritual/Emotional needs   Type Spiritual Support   Assessment/Intervention/Outcome   Assessment Anxious    was offered and prayer given

## 2022-09-22 NOTE — PROGRESS NOTES
This RN attempted to call to have the patients ICD turned off, but no one answered. Will report to next nurse to try again.

## 2022-09-22 NOTE — PROGRESS NOTES
Critical Care Pain Observation Tool  CPOT 9/22/2022 9/22/2022 9/22/2022 9/22/2022 9/22/2022   Patient Ventilation Status Not Intubated Not Intubated Not Intubated Not Intubated Not Intubated   Facial Expression  Tense Relaxed, neutral Grimacing Relaxed, neutral Relaxed, neutral   Body Movements Restlessness Absence of movements Restlessness Absence of movements Absence of movements   Muscle Tension Tense, rigid Relaxed Tense, rigid Relaxed Relaxed   Vocalization Sighing, moaning Talking in normal tone Sighing, moaning Sighing, moaning Sighing, moaning   CPOT (not intubated) Score 5 0 6 1 1       Patient increased restlessness and moaning. Grabbing at the air and sitting up in bed crying out. PRN ativan and morphine administered per order.

## 2022-09-22 NOTE — PLAN OF CARE
Problem: Discharge Planning  Goal: Discharge to home or other facility with appropriate resources  Outcome: Progressing  Flowsheets (Taken 9/22/2022 0045 by Ramesh Hwang RN)  Discharge to home or other facility with appropriate resources: Arrange for needed discharge resources and transportation as appropriate     Problem: Respiratory - Adult  Goal: Achieves optimal ventilation and oxygenation  Outcome: Progressing  Flowsheets (Taken 9/22/2022 0045 by Ramesh Hwang RN)  Achieves optimal ventilation and oxygenation:   Assess for changes in respiratory status   Assess for changes in mentation and behavior   Position to facilitate oxygenation and minimize respiratory effort   Assess and instruct to report shortness of breath or any respiratory difficulty     Problem: Safety - Adult  Goal: Free from fall injury  Outcome: Progressing  Flowsheets (Taken 9/21/2022 2343 by Kendall Cortez RN)  Free From Fall Injury: Instruct family/caregiver on patient safety     Problem: Skin/Tissue Integrity  Goal: Absence of new skin breakdown  Description: 1. Monitor for areas of redness and/or skin breakdown  2. Assess vascular access sites hourly  3. Every 4-6 hours minimum:  Change oxygen saturation probe site  4. Every 4-6 hours:  If on nasal continuous positive airway pressure, respiratory therapy assess nares and determine need for appliance change or resting period. Outcome: Progressing  Note: Pt.  Remains free from new skin breakdown       Problem: Pain  Goal: Verbalizes/displays adequate comfort level or baseline comfort level  Outcome: Progressing  Flowsheets  Taken 9/22/2022 1730  Verbalizes/displays adequate comfort level or baseline comfort level:   Encourage patient to monitor pain and request assistance   Assess pain using appropriate pain scale   Administer analgesics based on type and severity of pain and evaluate response   Implement non-pharmacological measures as appropriate and evaluate response  Taken 9/22/2022 0945  Verbalizes/displays adequate comfort level or baseline comfort level:   Encourage patient to monitor pain and request assistance   Assess pain using appropriate pain scale   Administer analgesics based on type and severity of pain and evaluate response   Implement non-pharmacological measures as appropriate and evaluate response     Problem: Chronic Conditions and Co-morbidities  Goal: Patient's chronic conditions and co-morbidity symptoms are monitored and maintained or improved  Outcome: Progressing  Flowsheets (Taken 9/22/2022 0045 by Tejal Solo RN)  Care Plan - Patient's Chronic Conditions and Co-Morbidity Symptoms are Monitored and Maintained or Improved: Monitor and assess patient's chronic conditions and comorbid symptoms for stability, deterioration, or improvement     Problem: Nutrition Deficit:  Goal: Optimize nutritional status  Outcome: Progressing  Flowsheets (Taken 9/21/2022 2343 by Beth Diego RN)  Nutrient intake appropriate for improving, restoring, or maintaining nutritional needs:   Assess nutritional status and recommend course of action   Monitor oral intake, labs, and treatment plans     Problem: ABCDS Injury Assessment  Goal: Absence of physical injury  Outcome: Progressing  Flowsheets (Taken 9/21/2022 0209 by Beth Diego RN)  Absence of Physical Injury: Implement safety measures based on patient assessment     Problem: Genitourinary - Adult  Goal: Absence of urinary retention  Outcome: Progressing  Flowsheets (Taken 9/22/2022 0820)  Absence of urinary retention: Assess patients ability to void and empty bladder     Problem: Genitourinary - Adult  Goal: Urinary catheter remains patent  Outcome: Progressing  Flowsheets (Taken 9/22/2022 0820)  Urinary catheter remains patent: Assess patency of urinary catheter   Care plan reviewed with patient family. Patient family verbalize understanding of the plan of care and contribute to goal setting.

## 2022-09-22 NOTE — PLAN OF CARE
Problem: Discharge Planning  Goal: Discharge to home or other facility with appropriate resources  9/21/2022 2343 by Key Yousif RN  Outcome: Progressing  Flowsheets (Taken 9/21/2022 2115)  Discharge to home or other facility with appropriate resources:   Identify barriers to discharge with patient and caregiver   Identify discharge learning needs (meds, wound care, etc)     Problem: Respiratory - Adult  Goal: Achieves optimal ventilation and oxygenation  9/21/2022 2343 by Key Yousif RN  Outcome: Progressing  Flowsheets (Taken 9/21/2022 2115)  Achieves optimal ventilation and oxygenation:   Assess for changes in respiratory status   Assess for changes in mentation and behavior   Assess and instruct to report shortness of breath or any respiratory difficulty   Respiratory therapy support as indicated   Assess the need for suctioning and aspirate as needed     Problem: Safety - Adult  Goal: Free from fall injury  9/21/2022 2343 by Key Yousif RN  Outcome: Progressing  Flowsheets (Taken 9/21/2022 2343)  Free From Fall Injury: Instruct family/caregiver on patient safety     Problem: Skin/Tissue Integrity  Goal: Absence of new skin breakdown  Description: 1. Monitor for areas of redness and/or skin breakdown  2. Assess vascular access sites hourly  3. Every 4-6 hours minimum:  Change oxygen saturation probe site  4. Every 4-6 hours:  If on nasal continuous positive airway pressure, respiratory therapy assess nares and determine need for appliance change or resting period.   Outcome: Progressing     Problem: Pain  Goal: Verbalizes/displays adequate comfort level or baseline comfort level  9/21/2022 2343 by Key Yousif RN  Outcome: Progressing  Flowsheets (Taken 9/21/2022 2343)  Verbalizes/displays adequate comfort level or baseline comfort level:   Encourage patient to monitor pain and request assistance   Implement non-pharmacological measures as appropriate and evaluate response   Consider cultural and social influences on pain and pain management     Problem: Chronic Conditions and Co-morbidities  Goal: Patient's chronic conditions and co-morbidity symptoms are monitored and maintained or improved  9/21/2022 2343 by Reino Landau, RN  Outcome: Progressing  Flowsheets (Taken 9/21/2022 2115)  Care Plan - Patient's Chronic Conditions and Co-Morbidity Symptoms are Monitored and Maintained or Improved:   Monitor and assess patient's chronic conditions and comorbid symptoms for stability, deterioration, or improvement   Collaborate with multidisciplinary team to address chronic and comorbid conditions and prevent exacerbation or deterioration     Problem: Nutrition Deficit:  Goal: Optimize nutritional status  9/21/2022 2343 by Reino Landau, RN  Outcome: Progressing  Flowsheets (Taken 9/21/2022 2343)  Nutrient intake appropriate for improving, restoring, or maintaining nutritional needs:   Assess nutritional status and recommend course of action   Monitor oral intake, labs, and treatment plans     Problem: ABCDS Injury Assessment  Goal: Absence of physical injury  9/21/2022 2343 by Reino Landau, RN  Outcome: Progressing  Flowsheets (Taken 9/21/2022 0209)  Absence of Physical Injury: Implement safety measures based on patient assessment

## 2022-09-22 NOTE — PROGRESS NOTES
Met with sons Sulaiman Rucker, daughters Azucena Fang and son in law Arlene Rojo. Hospice services, concepts, and philosophies dicussed. Explained the different levels of care and where the care can be done. Explained  home with 24 hour care from family, nursing home with hospice services paid by medicare and room and board paid by family, then GIP with symptoms uncontrolled and unable to be done at another level. [facility] with [facility]. Pt has been in the nursing home for about 1 week. Nurse went to check in with pt. Found pt at end of bed moaning and restless trying to get out of bed. No verbalization. Pt repositioned and oral care done. Pt sucked on oral swab. Ativan given per Dean Mendez. Updated family with findings. Message sent to Dr Tressa Hood with plan. Scripts for comfort meds and DNR-CC put in [employer] to be signed by dr. Juhi Lindo  Chika Cohen and nurse Mati and Joann Bedoya case [facility] with plan. [facility]s [facility] with D[facility] form. Copies faxed to office. Updated son Autumn Napoles with the time for transport at 1300, 9/22/22. Updated that we will be monitoring pt's symptoms and medication usage if he would qualify for GIP. Hospice liaison will be in tomorrow to check on pt. Oxygen order in [facility] for  to sign tomorrow.

## 2022-09-23 NOTE — PROGRESS NOTES
Report called to [facility] faxed. Discharge education and instructions provided. Patient family verbalized understanding at this time. No questions asked. IV access removed and Central line removed. All personal belongings, AVS sent with transport [facility]. Transport via [employer] to [facility]. Hospice called and notified of transport.

## 2022-09-23 NOTE — PROGRESS NOTES
Hospice nurse visit made to assist with discharge to [facility] with hospice service. Mitchell Coombs resting in bed with no s/s of distress or discomfort noted. Discussed with Dr. Yuniel Mishra and patient did require incrase in ativan due to increase restlessness/anxiety. Provider signed oxygen script and this was faxed to DME for delivery of oxygen. Review of scripts signed for discharge that were signed. These do not refect medication needs that patient has had since being signed. New scritps wrote out and signed by provider, others put in shred bin. Call place to son Jayme Adams to see if queations in regards to information given on 09.22.2022 and plan of discharge to facility with hospice admission when getting there. Denied at this time. They are going to be up to hospital in little while. Told then nurse may stop and visit them. Primary nurse Jose Angel RN will fax AVS to hospice office at [phone] and call hospice number at [phone] when patient leaves and if transport time would change.

## 2022-09-23 NOTE — PROGRESS NOTES
Updated by primary nurse Gavi Fulton RN that family asking if hospice nurse going to visit again. Visit made and met Galaviz Randi and Melvina at bedside. Let them know hospice nurse Delmar Hoyos RN will be going to admit Cayetano Hunter to hospice service. Explained main line will need pulled and may not be able to leave at 1300 with need for patient to lie flat for hour. Could be done with patient going by stretcher. Much support given, asked about patient and his wife. Primary nurse Gavi Fulton RN aware hospice nurse can assist as needed.

## 2022-09-23 NOTE — CARE COORDINATION
9/23/22, 3:28 PM EDT    Patient goals/plan/ treatment preferences discussed by  and . Patient goals/plan/ treatment preferences reviewed with patient/ family. Patient/ family verbalize understanding of discharge plan and are in agreement with goal/plan/treatment preferences. Understanding was demonstrated using the teach back method. AVS provided by RN at time of discharge, which includes all necessary medical information pertaining to the patients current course of illness, treatment, post-discharge goals of care, and treatment preferences. Services At/After Discharge: [facility], Hospice, and In [address] [place] [address]) Ambulance       IMM Letter  IMM Letter given to Patient/Family/Significant other/Guardian/POA/by[de-identified] Livia Salguero RN   IMM Letter date given[de-identified] 09/23/22  IMM Letter time given[de-identified] 0900     Pt discharged to [facility] hospital with Hospice today.

## 2022-09-23 NOTE — DISCHARGE SUMMARY
Hospitalist Discharge Summary        Patient: Hodan Castellano  YOB: 1933  MRN: [id number]   Acct: [de-identified]    Primary Care Physician: Betty Orozco MD    Admit date  9/17/2022    Discharge date:  9/23/2022 10:33 AM    Chief Complaint on presentation :-    SOB    Discharge Assessment and Plan:-   Acute hypoxic respiratory failure  Aspiration pneumonia  COVID-19 pneumonia  Family requested comfort measures only rather than aggressive care and consideration for PEG placement. Hospice was consulted and the patient's code status was changed to reflect the family's wishes  Planning on DC to SNF with hospice care   Morphine/ativan for pain/anxiety/restlessness  Chronic conditions:  HFrEF  CAD s/p CABG  SSS s/p PPM/AICD  Persistent AF  Possible UTI  HLD  Dementia  Kaposi sarcoma    Initial H and P and Hospital course:-  Patient presented to the hospital with complaints of SOB and hypoxia with worsening confusion. Noted SpO2 per EMS was 70% on room air. Upon arrival, he was placed on a non-rebreather and then to BiPAP. He was noted to have hypotension with placement of a CVC and initiation of levophed and noted febrile to 102. COVID testing was positive. Imaging suggestive of multifocal pneumonia with concerns for aspiration and CHF. He was started on empiric antibiotics and admitted to the ICU. The patient developed hemoptysis on 9/19 with noted supratherapeutic INR at 5.72 and he received FFP and vitamin K. On 9/20, he was weaned off of vasopressors to nasal cannula. The patient was then stepped out of the ICU. Due to persistent confusion as well as SLP evaluations recommending PEG placement, the family discussed goals or care and ultimately decided to not pursue PEG placement and opted for comfort measures. They met with the hospice team and plan to discharge to SNF with hospice care. Morphine and ativan were provided for symptomatic relief.       Physical Exam:-  Vitals: Patient Vitals for the past 24 hrs:   BP Temp Temp src Pulse Resp SpO2   09/23/22 0900 136/67 98.6 °F (37 °C) Axillary 68 20 98 %   09/22/22 2200 -- 98.2 °F (36.8 °C) Axillary 70 20 90 %   09/1934 -- -- -- -- 14 --   09/22/22 1730 134/72 97.3 °F (36.3 °C) Axillary 70 22 --     Weight:   Weight: 167 lb 12.8 oz (76.1 kg)   24 hour intake/output:     Intake/Output Summary (Last 24 hours) at 9/23/2022 1033  Last data filed at 9/22/2022 2026  Gross per 24 hour   Intake 0 ml   Output 850 ml   Net -850 ml       General appearance: No apparent distress. Resting comfortably. HEENT: Pupils equal, round, and reactive to light. Dry mucous membranes. Neck: Supple without JVD  Respiratory:  Normal respiratory effort. Clear to auscultation, bilaterally without Rales/Wheezes/Rhonchi. Cardiovascular: Regular rate and rhythm with normal S1/S2 without murmurs, rubs or gallops. Abdomen: Soft, non-tender, non-distended with normal bowel sounds. Somewhat scaphoid in appearance. Musculoskeletal: passive and active ROM x 4 extremities. Skin: Skin color, texture, poor skin normal.  No rashes or lesions. Neurologic:  anxious appearing. Moves extremities spontaneously. Does answer questions appropriately   Psychiatric: alert but anxious.        Discharge Medications:-      Medication List        STOP taking these medications      acetaminophen 325 MG tablet  Commonly known as: TYLENOL     allopurinol 100 MG tablet  Commonly known as: ZYLOPRIM     aspirin 81 MG EC tablet     atorvastatin 20 MG tablet  Commonly known as: LIPITOR     CALCIUM + D PO     carvedilol 6.25 MG tablet  Commonly known as: COREG     digoxin 250 MCG tablet  Commonly known as: Digox     donepezil 5 MG tablet  Commonly known as: ARICEPT     Entresto 24-26 MG per tablet  Generic drug: sacubitril-valsartan     furosemide 40 MG tablet  Commonly known as: LASIX     loperamide 2 MG capsule  Commonly known as: IMODIUM     LORazepam 1 MG tablet  Commonly known as: ATIVAN     Melatonin 2.5 MG Chew     therapeutic multivitamin-minerals tablet     warfarin 5 MG tablet  Commonly known as: COUMADIN               Labs :-  Recent Results (from the past 72 hour(s))   Basic Metabolic Panel    Collection Time: 09/21/22  4:25 AM   Result Value Ref Range    Sodium 141 135 - 145 meq/L    Potassium 3.9 3.5 - 5.2 meq/L    Chloride 104 98 - 111 meq/L    CO2 30 23 - 33 meq/L    Glucose 109 (H) 70 - 108 mg/dL    BUN 50 (H) 7 - 22 mg/dL    Creatinine 0.9 0.4 - 1.2 mg/dL    Calcium 8.9 8.5 - 10.5 mg/dL   Anion Gap    Collection Time: 09/21/22  4:25 AM   Result Value Ref Range    Anion Gap 7.0 (L) 8.0 - 16.0 meq/L   Glomerular Filtration Rate, Estimated    Collection Time: 09/21/22  4:25 AM   Result Value Ref Range    Est, Glom Filt Rate 79 (A) ml/min/1.73m2        Microbiology:    Blood culture #1:   Lab Results   Component Value Date/Time    BC No growth 24 hours. 09/17/2022 03:31 PM    BC  09/17/2022 03:31 PM     possible contamination; clinical correlation required     [facility]  09/17/2022 03:31 PM     No growth 24 hours. No growth 48 hours.  No growth at 5 days        Blood culture #2:No results found for: BLOODCULT2    Organism:  No results found for: LABGRAM    MRSA culture only:No results found for: [address] [place] [address]    Urine culture:   Lab Results   Component Value Date/Time    LABURIN No growth-preliminary No growth  09/17/2022 08:20 PM     Lab Results   Component Value Date/Time    ORG Staphylococcus (coagulase negative) 09/17/2022 03:31 PM        Respiratory culture: No results found for: CULTRESP    Aerobic and Anaerobic :  No results found for: LABAERO  No results found for: LABANAE    Urinalysis:      Lab Results   Component Value Date/Time    NITRU NEGATIVE 09/17/2022 03:00 PM    WBCUA 10-15 09/17/2022 03:00 PM    BACTERIA MODERATE 09/17/2022 03:00 PM    RBCUA 10-15 09/17/2022 03:00 PM    BLOODU NEGATIVE 09/17/2022 03:00 PM    SPECGRAV 1.019 12/08/2015 08:17 AM    GLUCOSEU NEGATIVE 09/17/2022 03:00 PM       Radiology:-  CT Head WO Contrast    Result Date: 9/17/2022  PROCEDURE: NONCONTRAST CT BRAIN CLINICAL INFORMATION: Altered mental status COMPARISON: No prior study TECHNIQUE: Multiple axial 5 mm images of the brain were obtained without administration of intravenous contrast material. ALL CT SCANS AT THIS FACILITY use dose modulation, iterative reconstruction, and/or weight-based dosing when appropriate to reduce radiation dose to as low as reasonably achievable. FINDINGS: VENTRICLES: There is mild enlargement of the ventricles diffusely, consistent with central atrophy. Moderate diffuse cerebral cortical atrophy and cerebellar cortical atrophy are also demonstrated. PARENCHYMA:  No acute infarction, mass lesion, or intracranial hemorrhage is seen. MASTOID PROCESSES: Well aerated. Normal in appearance. Zoila Gallery PARANASAL SINUSES/CALVARIUM: Unremarkable     No acute intracranial process. . **This report has been created using voice recognition software. It may contain minor errors which are inherent in voice recognition technology. ** Final report electronically signed by Dr. Yariel Mcpherson on 9/17/2022 6:50 PM    XR CHEST PORTABLE    Result Date: 9/18/2022  Chest Radiograph Comparison: 9/17/22 Findings: Right central venous catheter with the tip terminating at the cavoatrial junction. Cardiomegaly status post aortic and mitral valve replacement and left chest wall AICD. Normal mediastinal contours. No pneumothorax. Bilateral central opacity. Calcified pleural plaques. Small bilateral pleural effusions. Normal upper abdomen. No fracture. Impression: Moderate cardiogenic pulmonary edema is favored over multifocal infection. This document has been electronically signed by: Isha Donalsdon. Janiya Tran MD on 09/18/2022 05:42 AM    XR CHEST PORTABLE    Result Date: 9/17/2022  PROCEDURE: XR CHEST PORTABLE CLINICAL INFORMATION: Central venous catheter insertion. Covid infection.  COMPARISON: Earlier film same date, 1430 hours. TECHNIQUE: A single mobile view of the chest was obtained. 1. Moderate cardiomegaly. Permanent pacemaker/defibrillator. 2 prosthetic heart valves. Metallic sternotomy sutures. A right jugular line has been inserted with tip in the SVC. Calcified pleural plaques are again seen bilaterally. Cannot exclude a tiny effusion right side. 2. Moderate interstitial infiltrates throughout both lungs, consistent with interstitial pneumonia/edema. Overall appearance of chest somewhat worse than prior. **This report has been created using voice recognition software. It may contain minor errors which are inherent in voice recognition technology. ** Final report electronically signed by Dr. Deysi Brush on 9/17/2022 5:18 PM    XR CHEST PORTABLE    Result Date: 9/17/2022  PROCEDURE: XR CHEST PORTABLE CLINICAL INFORMATION: Altered mental status, Shortness of breath COMPARISON: 12/19/2021 TECHNIQUE: A single mobile view of the chest was obtained. 1. Moderate Noé. Permanent pacemaker/February. 2 prosthetic heart valves. Metallic sternotomy sutures. Prominent pleural plaque along right hemidiaphragm and another along the lateral aspect of the left lung base. 2. Moderate pneumonia/pulmonary edema both mid and lower lung fields, most severe left lung base. **This report has been created using voice recognition software. It may contain minor errors which are inherent in voice recognition technology. ** Final report electronically signed by Dr. Deysi Brush on 9/17/2022 2:53 PM       Follow-up scheduled after discharge :-    Per hospice    Consultations during this hospital stay:-  [] NONE [] Cardiology  [] Nephrology  [] Hemo onco   [] GI   [] ID  [] Endocrine  [] Pulm    [] Neuro    [] Psych   [] Urology  [] ENT   [] G SURGERY   []Ortho    []CV surg    [] Palliative  [x] Hospice [] Pain management   []    []TCU   [] PT/OT  OTHERS:-    Disposition: SNF  Condition at Discharge:  Comfort care/hospice care    Time Spent:- 45 minutes    Electronically signed by Kelly Sandhu DO on 9/23/22 at 10:33 AM EDT   Discharging Hospitalist

## 2022-09-23 NOTE — PLAN OF CARE
Problem: Discharge Planning  Goal: Discharge to home or other facility with appropriate resources  9/23/2022 1206 by Areli Huertas RN  Outcome: Adequate for Discharge  Flowsheets (Taken 9/23/2022 0800)  Discharge to home or other facility with appropriate resources:   Identify barriers to discharge with patient and caregiver   Arrange for needed discharge resources and transportation as appropriate   Identify discharge learning needs (meds, wound care, etc)     Problem: Respiratory - Adult  Goal: Achieves optimal ventilation and oxygenation  Outcome: Adequate for Discharge  Flowsheets (Taken 9/23/2022 0800)  Achieves optimal ventilation and oxygenation:   Assess for changes in respiratory status   Assess for changes in mentation and behavior     Problem: Safety - Adult  Goal: Free from fall injury  9/23/2022 1206 by Areli Huertas RN  Outcome: Adequate for Discharge     Problem: Skin/Tissue Integrity  Goal: Absence of new skin breakdown  Description: 1. Monitor for areas of redness and/or skin breakdown  2. Assess vascular access sites hourly  3. Every 4-6 hours minimum:  Change oxygen saturation probe site  4. Every 4-6 hours:  If on nasal continuous positive airway pressure, respiratory therapy assess nares and determine need for appliance change or resting period.   9/23/2022 1206 by Areli Huertas RN  Outcome: Adequate for Discharge     Problem: Pain  Goal: Verbalizes/displays adequate comfort level or baseline comfort level  9/23/2022 1206 by Areli Huertas RN  Outcome: Adequate for Discharge  Flowsheets (Taken 9/23/2022 0900)  Verbalizes/displays adequate comfort level or baseline comfort level:   Encourage patient to monitor pain and request assistance   Assess pain using appropriate pain scale     Problem: Chronic Conditions and Co-morbidities  Goal: Patient's chronic conditions and co-morbidity symptoms are monitored and maintained or improved  9/23/2022 1206 by Areli Huertas RN  Outcome: Adequate for Discharge  Flowsheets (Taken 9/23/2022 0800)  Care Plan - Patient's Chronic Conditions and Co-Morbidity Symptoms are Monitored and Maintained or Improved: Monitor and assess patient's chronic conditions and comorbid symptoms for stability, deterioration, or improvement     Problem: Nutrition Deficit:  Goal: Optimize nutritional status  Outcome: Adequate for Discharge     Problem: ABCDS Injury Assessment  Goal: Absence of physical injury  Outcome: Adequate for Discharge     Problem: Genitourinary - Adult  Goal: Absence of urinary retention  9/23/2022 1206 by Cassandra Seymour RN  Outcome: Adequate for Discharge  Flowsheets (Taken 9/23/2022 0800)  Absence of urinary retention:   Discuss with Licensed Independent Practitioner  medications to alleviate retention as needed   Discuss catheterization for long term situations as appropriate     Problem: Genitourinary - Adult  Goal: Urinary catheter remains patent  9/23/2022 1206 by Cassandra Seymour RN  Outcome: Adequate for Discharge  Flowsheets (Taken 9/23/2022 0800)  Urinary catheter remains patent: Assess patency of urinary catheter     Problem: Dyspnea Due to End of Life  Goal: Demonstrate understanding of and ability to manage respiratory symptoms at end of life  Description: Patient  and or family/caregiver will verbalize recall of breathing strategies to maintain an effective breathing pattern during the inpatient hospice stay. 9/23/2022 1206 by Cassandra Seymour RN  Outcome: Adequate for Discharge     Problem: Communication Deficit  Goal: Effectively communicate symptoms, needs, and concerns  Description: Patient  and/or family/caregiver will be able to communicate symptoms, needs, and concerns as evidenced by the use of language services during the inpatient hospice stay. 9/23/2022 1206 by Cassandra Seymour RN  Outcome: Adequate for Discharge   Care plan reviewed with patient and patient family.   Patient family verbalize understanding of the plan of care and contribute to goal setting.

## 2022-09-23 NOTE — PROGRESS NOTES
Order received for CVC removal per Dr. Burnett Osgood . Patient placed in bed. Transparent dressing removed. Skin accessed appears clean and dry. Sutures removed, area cleaned with chloro prep, bio patch applied, sterile gauze placed over bio patch, CVC removed with green tip intact, pressure held with sterile gauze for 5 minutes. New transparent dressing applied. Educated the patient on remaining in bed for one hour, dressing stays on for 24 hours, signs and symptoms of infection and notify primary nurse if any blood or oozing. Primary RN notified.

## 2022-09-23 NOTE — PLAN OF CARE
Problem: Discharge Planning  Goal: Discharge to home or other facility with appropriate resources  9/23/2022 0146 by Bia Nascimento RN  Outcome: Progressing  Flowsheets (Taken 9/23/2022 0146)  Discharge to home or other facility with appropriate resources:   Identify barriers to discharge with patient and caregiver   Arrange for needed discharge resources and transportation as appropriate   Arrange for interpreters to assist at discharge as needed   Refer to discharge planning if patient needs post-hospital services based on physician order or complex needs related to functional status, cognitive ability or social support system   Identify discharge learning needs (meds, wound care, etc)     Problem: Genitourinary - Adult  Goal: Absence of urinary retention  9/23/2022 0146 by Bia Nascimento RN  Outcome: Progressing  Flowsheets (Taken 9/23/2022 0146)  Absence of urinary retention:   Place urinary catheter per Licensed Independent Practitioner order if needed   Monitor intake/output and perform bladder scan as needed   Assess patients ability to void and empty bladder     Problem: Genitourinary - Adult  Goal: Urinary catheter remains patent  9/23/2022 0146 by Bia Nascimento RN  Outcome: Progressing  Flowsheets (Taken 9/23/2022 0146)  Urinary catheter remains patent:   Assess patency of urinary catheter   Irrigate catheter per Licensed Independent Practitioner order if indicated and notify Licensed Independent Practitioner if unable to irrigate     Problem: Safety - Adult  Goal: Free from fall injury  9/23/2022 0146 by Bia Nascimento RN  Outcome: Progressing  Flowsheets (Taken 9/23/2022 0146)  Free From Fall Injury:   Instruct family/caregiver on patient safety   Based on caregiver fall risk screen, instruct family/caregiver to ask for assistance with transferring infant if caregiver noted to have fall risk factors     Problem: Skin/Tissue Integrity  Goal: Absence of new skin breakdown  Description: 1.   Monitor for areas of redness and/or skin breakdown  2. Assess vascular access sites hourly  3. Every 4-6 hours minimum:  Change oxygen saturation probe site  4. Every 4-6 hours:  If on nasal continuous positive airway pressure, respiratory therapy assess nares and determine need for appliance change or resting period. 9/23/2022 0146 by Rosanne Arellano RN  Outcome: Progressing  Note: No new skin breakdown. Pt turned and repositioned. Will continue on going skin assessment.         Problem: Pain  Goal: Verbalizes/displays adequate comfort level or baseline comfort level  9/23/2022 0146 by Rosanne Arellano RN  Outcome: Progressing  Flowsheets (Taken 9/23/2022 0146)  Verbalizes/displays adequate comfort level or baseline comfort level:   Encourage patient to monitor pain and request assistance   Assess pain using appropriate pain scale   Administer analgesics based on type and severity of pain and evaluate response   Implement non-pharmacological measures as appropriate and evaluate response   Consider cultural and social influences on pain and pain management   Notify Licensed Independent Practitioner if interventions unsuccessful or patient reports new pain     Problem: Chronic Conditions and Co-morbidities  Goal: Patient's chronic conditions and co-morbidity symptoms are monitored and maintained or improved  9/23/2022 0146 by Rosanne Arellano RN  Outcome: Progressing  Flowsheets (Taken 9/23/2022 0146)  Care Plan - Patient's Chronic Conditions and Co-Morbidity Symptoms are Monitored and Maintained or Improved:   Monitor and assess patient's chronic conditions and comorbid symptoms for stability, deterioration, or improvement   Collaborate with multidisciplinary team to address chronic and comorbid conditions and prevent exacerbation or deterioration   Update acute care plan with appropriate goals if chronic or comorbid symptoms are exacerbated and prevent overall improvement and discharge     Problem: Dyspnea Due to End of Life  Goal: Demonstrate understanding of and ability to manage respiratory symptoms at end of life  Description: Patient  and or family/caregiver will verbalize recall of breathing strategies to maintain an effective breathing pattern during the inpatient hospice stay. Outcome: Progressing     Problem: Communication Deficit  Goal: Effectively communicate symptoms, needs, and concerns  Description: Patient  and/or family/caregiver will be able to communicate symptoms, needs, and concerns as evidenced by the use of language services during the inpatient hospice stay. Outcome: Progressing    Care plan reviewed with patient's family. Patient's family verbalize understanding of the plan of care and contribute to goal setting.

## 2024-04-17 NOTE — PROGRESS NOTES
[facility]   [facility]    7.2 years on device   Rv imped 342  Shock 37  SVC 45  1.375 @ 0.4 threshold  R waves 2.1  93.4% paced   optivol WNL Lvm informing patient we would call back after Deanna spoke to Dr Arzate and he completed his note, informed pt that we were keeping an eye out for that and left call back number if Needed